# Patient Record
Sex: FEMALE | Race: WHITE
[De-identification: names, ages, dates, MRNs, and addresses within clinical notes are randomized per-mention and may not be internally consistent; named-entity substitution may affect disease eponyms.]

---

## 2017-01-09 NOTE — TELEPHONE ENCOUNTER
Pharmacy called in to follow up on refill for pt.     Current outpatient prescriptions:     •  simvastatin 40 MG Oral Tab, TAKE 1 TABLET BY MOUTH NIGHTLY, Disp: 90 tablet, Rfl: 1

## 2017-01-10 RX ORDER — SIMVASTATIN 40 MG
TABLET ORAL
Qty: 90 TABLET | Refills: 1 | Status: SHIPPED | OUTPATIENT
Start: 2017-01-10 | End: 2017-07-24

## 2017-01-10 RX ORDER — SIMVASTATIN 40 MG
TABLET ORAL
Qty: 90 TABLET | Refills: 0 | Status: SHIPPED | OUTPATIENT
Start: 2017-01-10 | End: 2017-01-10

## 2017-01-10 NOTE — TELEPHONE ENCOUNTER
Hypertensive Medications  Protocol Criteria:  · Appointment scheduled in the past 6 months or in the next 3 months  · BMP or CMP in the past 12 months  · Creatinine result < 2  Recent Visits       Provider Department Primary Dx    6 months ago [address], [place] Type 2 diabetes mellitus without complication, with long-term current use of insulin ([facility]    11 months ago Nicolasa Sanchez MD [facility] [place], [facility], [address], [place] Type 2 diabetes mellitus without complication [facility])        Future Appointmen

## 2017-01-11 ENCOUNTER — PRIOR ORIGINAL RECORDS (OUTPATIENT)
Dept: OTHER | Age: 72
End: 2017-01-11

## 2017-01-12 ENCOUNTER — LAB ENCOUNTER (OUTPATIENT)
Dept: LAB | Facility: HOSPITAL | Age: 72
End: 2017-01-12
Attending: INTERNAL MEDICINE
Payer: MEDICARE

## 2017-01-12 ENCOUNTER — PRIOR ORIGINAL RECORDS (OUTPATIENT)
Dept: OTHER | Age: 72
End: 2017-01-12

## 2017-01-12 DIAGNOSIS — I50.20: Primary | ICD-10-CM

## 2017-01-12 LAB
ANION GAP SERPL CALC-SCNC: 9 MMOL/L (ref 0–18)
BNP SERPL-MCNC: 579 PG/ML (ref 0–100)
BUN SERPL-MCNC: 34 MG/DL (ref 8–20)
BUN/CREAT SERPL: 21.1 (ref 10–20)
CALCIUM SERPL-MCNC: 8.9 MG/DL (ref 8.5–10.5)
CHLORIDE SERPL-SCNC: 100 MMOL/L (ref 95–110)
CO2 SERPL-SCNC: 31 MMOL/L (ref 22–32)
CREAT SERPL-MCNC: 1.61 MG/DL (ref 0.5–1.5)
GLUCOSE SERPL-MCNC: 307 MG/DL (ref 70–99)
OSMOLALITY UR CALC.SUM OF ELEC: 309 MOSM/KG (ref 275–295)
POTASSIUM SERPL-SCNC: 5.5 MMOL/L (ref 3.3–5.1)
SODIUM SERPL-SCNC: 140 MMOL/L (ref 136–144)

## 2017-01-12 PROCEDURE — 36415 COLL VENOUS BLD VENIPUNCTURE: CPT

## 2017-01-12 PROCEDURE — 80048 BASIC METABOLIC PNL TOTAL CA: CPT

## 2017-01-12 PROCEDURE — 83880 ASSAY OF NATRIURETIC PEPTIDE: CPT

## 2017-01-13 LAB
BNP: 579 PMOL/L
BUN: 34 MG/DL
CALCIUM: 8.9 MG/DL
CHLORIDE: 100 MEQ/L
CREATININE, SERUM: 1.61 MG/DL
GLUCOSE: 307 MG/DL
POTASSIUM, SERUM: 5.5 MEQ/L
SODIUM: 140 MEQ/L

## 2017-01-30 ENCOUNTER — OFFICE VISIT (OUTPATIENT)
Dept: INTERNAL MEDICINE CLINIC | Facility: CLINIC | Age: 72
End: 2017-01-30

## 2017-01-30 ENCOUNTER — APPOINTMENT (OUTPATIENT)
Dept: LAB | Facility: HOSPITAL | Age: 72
End: 2017-01-30
Attending: INTERNAL MEDICINE
Payer: MEDICARE

## 2017-01-30 ENCOUNTER — PRIOR ORIGINAL RECORDS (OUTPATIENT)
Dept: OTHER | Age: 72
End: 2017-01-30

## 2017-01-30 VITALS
SYSTOLIC BLOOD PRESSURE: 103 MMHG | WEIGHT: 219 LBS | BODY MASS INDEX: 40.3 KG/M2 | HEIGHT: 62 IN | HEART RATE: 59 BPM | DIASTOLIC BLOOD PRESSURE: 68 MMHG

## 2017-01-30 DIAGNOSIS — I50.23 ACUTE ON CHRONIC SYSTOLIC CONGESTIVE HEART FAILURE (HCC): ICD-10-CM

## 2017-01-30 DIAGNOSIS — M54.50 ACUTE RIGHT-SIDED LOW BACK PAIN WITHOUT SCIATICA: Primary | ICD-10-CM

## 2017-01-30 LAB
ANION GAP SERPL CALC-SCNC: 9 MMOL/L (ref 0–18)
BNP SERPL-MCNC: 345 PG/ML (ref 0–100)
BUN SERPL-MCNC: 33 MG/DL (ref 8–20)
BUN/CREAT SERPL: 23.1 (ref 10–20)
CALCIUM SERPL-MCNC: 9.5 MG/DL (ref 8.5–10.5)
CHLORIDE SERPL-SCNC: 99 MMOL/L (ref 95–110)
CO2 SERPL-SCNC: 32 MMOL/L (ref 22–32)
CREAT SERPL-MCNC: 1.43 MG/DL (ref 0.5–1.5)
GLUCOSE SERPL-MCNC: 190 MG/DL (ref 70–99)
OSMOLALITY UR CALC.SUM OF ELEC: 302 MOSM/KG (ref 275–295)
POTASSIUM SERPL-SCNC: 5.3 MMOL/L (ref 3.3–5.1)
SODIUM SERPL-SCNC: 140 MMOL/L (ref 136–144)

## 2017-01-30 PROCEDURE — 80048 BASIC METABOLIC PNL TOTAL CA: CPT

## 2017-01-30 PROCEDURE — 36415 COLL VENOUS BLD VENIPUNCTURE: CPT

## 2017-01-30 PROCEDURE — G0463 HOSPITAL OUTPT CLINIC VISIT: HCPCS | Performed by: INTERNAL MEDICINE

## 2017-01-30 PROCEDURE — 83880 ASSAY OF NATRIURETIC PEPTIDE: CPT

## 2017-01-30 PROCEDURE — 99213 OFFICE O/P EST LOW 20 MIN: CPT | Performed by: INTERNAL MEDICINE

## 2017-01-30 RX ORDER — BUMETANIDE 2 MG/1
2 TABLET ORAL DAILY
Qty: 90 TABLET | Refills: 1 | Status: ON HOLD | OUTPATIENT
Start: 2017-01-30 | End: 2018-07-09

## 2017-01-30 RX ORDER — MONTELUKAST SODIUM 10 MG/1
TABLET ORAL
Qty: 90 TABLET | Refills: 1 | Status: SHIPPED | OUTPATIENT
Start: 2017-01-30 | End: 2017-07-24

## 2017-01-30 RX ORDER — CARVEDILOL 12.5 MG/1
TABLET ORAL
Qty: 360 TABLET | Refills: 1 | Status: SHIPPED | OUTPATIENT
Start: 2017-01-30 | End: 2017-08-27

## 2017-01-30 NOTE — H&P
Rosa Elena Hillman is a 70year old female. Patient presents with:  Establish Care    HPI:   Ms. Debbie Harris presents this afternoon, accompanied by a friend, to establish ongoing primary care.   She expresses displeasure at the amount of waiting time she has experie 72 UNITS SUBCUTANEOUS WITH DINNER Disp: 45 mL Rfl: 2   DIGOX 125 MCG Oral Tab TAKE 1 TABLET BY MOUTH DAILY.  Disp: 90 tablet Rfl: 1   MONTELUKAST SODIUM 10 MG Oral Tab TAKE 1 TABLET(10 MG) BY MOUTH EVERY DAY Disp: 90 tablet Rfl: 0   SPIRONOLACTONE 25 MG Ora carcinoma, forehead 2012     excision   • Diabetes ([facility]       Social History:    Smoking Status: Former Smoker                   Packs/Day: 0.00  Years:           Quit date: 01/01/2003    Smokeless Status: Former User                       Alcohol Use: No

## 2017-01-30 NOTE — PATIENT INSTRUCTIONS
Please continue your current medications. Please rest your back and apply heat 2-3 times daily. You may take Tylenol or acetaminophen when needed for pain relief. Avoid all anti-inflammatory medication. Call if no better. Return visit in 3 months.

## 2017-01-31 ENCOUNTER — PRIOR ORIGINAL RECORDS (OUTPATIENT)
Dept: OTHER | Age: 72
End: 2017-01-31

## 2017-01-31 LAB
BNP: 345 PMOL/L
BUN: 33 MG/DL
CALCIUM: 9.5 MG/DL
CHLORIDE: 99 MEQ/L
CREATININE, SERUM: 1.43 MG/DL
GLUCOSE: 190 MG/DL
POTASSIUM, SERUM: 5.3 MEQ/L
SODIUM: 140 MEQ/L

## 2017-02-14 ENCOUNTER — PRIOR ORIGINAL RECORDS (OUTPATIENT)
Dept: OTHER | Age: 72
End: 2017-02-14

## 2017-02-14 ENCOUNTER — TELEPHONE (OUTPATIENT)
Dept: CARDIOLOGY CLINIC | Facility: HOSPITAL | Age: 72
End: 2017-02-14

## 2017-02-14 ENCOUNTER — TELEPHONE (OUTPATIENT)
Dept: INTERNAL MEDICINE CLINIC | Facility: CLINIC | Age: 72
End: 2017-02-14

## 2017-02-14 NOTE — TELEPHONE ENCOUNTER
Pt contacted and strongly advised to please go to the urgent care for evaluation since she won't go to the ER. Pt agreed. Will f/u up tomorrow.

## 2017-02-14 NOTE — TELEPHONE ENCOUNTER
Reason for Call/Chief Complaint: left leg swelling and pain  Onset: 2 days  Nursing Assessment/Associated Symptoms: left leg swollen,painful,warm to touch . Had same problem  3 weeks ago and went away when put on water pill but now came back.  Pt refusing t

## 2017-02-14 NOTE — TELEPHONE ENCOUNTER
Patient is a diabetic and is calling because she said her left leg hurts and is warm and tender to the touch. She said she always has water in her legs. She does take her water pill daily. A few weeks ago she was put on an extra water pill.

## 2017-02-14 NOTE — TELEPHONE ENCOUNTER
Patient called to inform that she has been having leg pain, slight swelling but stated that she always has swelling of legs.  After checking with Np's at specialty care center, pt was recommended to make appointment or go to e.r. I offered pt an appt for Th

## 2017-02-15 ENCOUNTER — OFFICE VISIT (OUTPATIENT)
Dept: INTERNAL MEDICINE CLINIC | Facility: CLINIC | Age: 72
End: 2017-02-15

## 2017-02-15 ENCOUNTER — HOSPITAL ENCOUNTER (OUTPATIENT)
Dept: ULTRASOUND IMAGING | Facility: HOSPITAL | Age: 72
Discharge: HOME | End: 2017-02-15
Attending: INTERNAL MEDICINE | Admitting: INTERNAL MEDICINE
Payer: MEDICARE

## 2017-02-15 VITALS
BODY MASS INDEX: 40.12 KG/M2 | RESPIRATION RATE: 16 BRPM | TEMPERATURE: 98 F | HEIGHT: 62 IN | SYSTOLIC BLOOD PRESSURE: 138 MMHG | WEIGHT: 218 LBS | DIASTOLIC BLOOD PRESSURE: 63 MMHG | HEART RATE: 76 BPM

## 2017-02-15 DIAGNOSIS — R60.0 LEG EDEMA, LEFT: Primary | ICD-10-CM

## 2017-02-15 DIAGNOSIS — R60.0 LEG EDEMA, LEFT: ICD-10-CM

## 2017-02-15 PROCEDURE — G0463 HOSPITAL OUTPT CLINIC VISIT: HCPCS | Performed by: INTERNAL MEDICINE

## 2017-02-15 PROCEDURE — 99213 OFFICE O/P EST LOW 20 MIN: CPT | Performed by: INTERNAL MEDICINE

## 2017-02-15 PROCEDURE — 93971 EXTREMITY STUDY: CPT

## 2017-02-15 RX ORDER — CEFADROXIL 500 MG/1
500 CAPSULE ORAL 2 TIMES DAILY
Qty: 14 CAPSULE | Refills: 0 | Status: SHIPPED | OUTPATIENT
Start: 2017-02-15 | End: 2017-02-22

## 2017-02-15 NOTE — TELEPHONE ENCOUNTER
[facility]     Phone room when the patient calls back please schedule the appointment as noted below. Thanks.

## 2017-02-15 NOTE — TELEPHONE ENCOUNTER
Patient went to the IC and was informed can possible have a blood clot which wants to rule out. They recommended to go to the ED. Patient is refusing.    Patient stated her feet were retaining water again and she took the Metolazone and within hours her

## 2017-02-15 NOTE — TELEPHONE ENCOUNTER
Can put pt in to see me on Wednesday between 2 and 430. I will be in Count includes the [facility] at that time doing administration work.  I can see her as an add on

## 2017-02-15 NOTE — PROGRESS NOTES
HPI:    Patient ID: Tomasa Reaves is a 70year old female. HPI  Patient is here complaining of swelling, redness, pain in the left lower leg. Multiple underlying chronic issues as listed below. I last saw her in the office in June.   Since that time sh CARDIAC PACEMAKER PLACEMENT  2012    Comment replacement of pacemaker and leads    CATARACT EXTRACTION W/  INTRAOCULAR LENS IMPLANT Right 5/14/12    Comment Dr. Kings Hebert at [facility]. [address] Left 6/4/12    Comment 5   Lancet Device Does not apply Misc Use as directed 3 times a day Disp: 300 each Rfl: 3   SEAN CONTOUR TEST In Vitro Strip USE TWO TO THREE TIMES DAILY AS DIRECTED Disp: 100 strip Rfl: 5   lisinopril (PRINIVIL,ZESTRIL) 2.5 MG Oral Tab Take 1 tablet (2.5 Psychiatric: She has a normal mood and affect.  Thought content normal.              ASSESSMENT/PLAN:   Patient with(R60.0) Leg edema, left  (primary encounter diagnosis)  Plan: US VENOUS DOPPLER LEG LEFT - DIAG IMG         (CPT=93971)         Leg edema b

## 2017-02-15 NOTE — TELEPHONE ENCOUNTER
Pt called back. She will be added to Dr. Arnulfo Junior schedule tomorrow at 4:10pm at the [facility]. Pt would like to speak with a nurse. She stts that she went to the Immediate Care and they were concerned that she may have a blood clot. Pt refuses to go to the ER.

## 2017-02-21 ENCOUNTER — TELEPHONE (OUTPATIENT)
Dept: INTERNAL MEDICINE CLINIC | Facility: CLINIC | Age: 72
End: 2017-02-21

## 2017-02-21 RX ORDER — SULFAMETHOXAZOLE AND TRIMETHOPRIM 800; 160 MG/1; MG/1
1 TABLET ORAL 2 TIMES DAILY
Qty: 14 TABLET | Refills: 0 | Status: SHIPPED | OUTPATIENT
Start: 2017-02-21 | End: 2017-02-28

## 2017-02-21 NOTE — TELEPHONE ENCOUNTER
Reason for Call/Chief Complaint: left leg  Onset: since LOV 2/15  Nursing Assessment/Associated Symptoms: will take last dose of ABX this evening, feels a constant throb with sitting,stated the swelling is more by night,no break in skin integrity,continue

## 2017-02-21 NOTE — TELEPHONE ENCOUNTER
Pt called in stating she just came in to see Dr. Dariela Dietz on 2/15 and was given an antibiotic for the infection on her leg.   Pt states today is her last day on the antibiotic and she does not feel as if the infection has improved, pt states she has the same sym

## 2017-02-21 NOTE — TELEPHONE ENCOUNTER
ASSESSMENT/PLAN:    Patient with(R60.0) Leg edema, left  (primary encounter diagnosis)  Plan: US VENOUS DOPPLER LEG LEFT - DIAG IMG          (CPT=93971)         Leg edema but unilateral tenderness, redness, warmth.  Given the unilateral nature and other ri

## 2017-02-22 NOTE — TELEPHONE ENCOUNTER
Spoke with patient and verbalized understanding . Advised to call back or go straight to ER if s/sx worsen, questions or concerns. Patient verbalized understanding and agrees with plan.

## 2017-02-22 NOTE — TELEPHONE ENCOUNTER
Call patient. Prescription for Bactrim sent to pharmacy make appointment if not resolving with that.

## 2017-02-23 ENCOUNTER — APPOINTMENT (OUTPATIENT)
Dept: LAB | Age: 72
End: 2017-02-23
Attending: INTERNAL MEDICINE
Payer: MEDICARE

## 2017-02-23 ENCOUNTER — HOSPITAL ENCOUNTER (OUTPATIENT)
Dept: GENERAL RADIOLOGY | Age: 72
Discharge: HOME | End: 2017-02-23
Attending: INTERNAL MEDICINE
Payer: MEDICARE

## 2017-02-23 ENCOUNTER — TELEPHONE (OUTPATIENT)
Dept: INTERNAL MEDICINE CLINIC | Facility: CLINIC | Age: 72
End: 2017-02-23

## 2017-02-23 ENCOUNTER — OFFICE VISIT (OUTPATIENT)
Dept: INTERNAL MEDICINE CLINIC | Facility: CLINIC | Age: 72
End: 2017-02-23

## 2017-02-23 ENCOUNTER — HOSPITAL ENCOUNTER (OUTPATIENT)
Dept: GENERAL RADIOLOGY | Age: 72
Discharge: HOME | End: 2017-02-23
Attending: INTERNAL MEDICINE | Admitting: INTERNAL MEDICINE
Payer: MEDICARE

## 2017-02-23 VITALS
DIASTOLIC BLOOD PRESSURE: 60 MMHG | HEIGHT: 62 IN | TEMPERATURE: 97 F | SYSTOLIC BLOOD PRESSURE: 114 MMHG | BODY MASS INDEX: 41.02 KG/M2 | WEIGHT: 222.88 LBS | HEART RATE: 61 BPM

## 2017-02-23 DIAGNOSIS — R07.81 RIB PAIN ON RIGHT SIDE: ICD-10-CM

## 2017-02-23 DIAGNOSIS — R07.81 RIB PAIN ON RIGHT SIDE: Primary | ICD-10-CM

## 2017-02-23 PROCEDURE — 99214 OFFICE O/P EST MOD 30 MIN: CPT | Performed by: INTERNAL MEDICINE

## 2017-02-23 PROCEDURE — 71020 XR CHEST PA + LAT CHEST (CPT=71020): CPT

## 2017-02-23 PROCEDURE — G0463 HOSPITAL OUTPT CLINIC VISIT: HCPCS | Performed by: INTERNAL MEDICINE

## 2017-02-23 PROCEDURE — 86334 IMMUNOFIX E-PHORESIS SERUM: CPT

## 2017-02-23 PROCEDURE — 84165 PROTEIN E-PHORESIS SERUM: CPT | Performed by: INTERNAL MEDICINE

## 2017-02-23 PROCEDURE — 71100 X-RAY EXAM RIBS UNI 2 VIEWS: CPT

## 2017-02-23 PROCEDURE — 36415 COLL VENOUS BLD VENIPUNCTURE: CPT

## 2017-02-23 RX ORDER — ACETAMINOPHEN AND CODEINE PHOSPHATE 300; 30 MG/1; MG/1
1 TABLET ORAL EVERY 6 HOURS PRN
Qty: 30 TABLET | Refills: 0 | Status: SHIPPED | OUTPATIENT
Start: 2017-02-23 | End: 2017-05-17

## 2017-02-23 NOTE — TELEPHONE ENCOUNTER
Reason for Call/Chief Complaint: back pain  Onset: 3 weeks, worsened last night  Nursing Assessment/Associated Symptoms: was a dull pain, but last night turned into sharp pain that comes and goes with movement. Denies fall or injury.   Pain is mostly below

## 2017-02-23 NOTE — TELEPHONE ENCOUNTER
Pt stts she has back pain. No open appointments with Dr. Anthony Rousseau. Pt declined to see another doctor. Pt asking to speak with a nurse. Please advise.

## 2017-02-23 NOTE — PROGRESS NOTES
HPI:    Patient ID: Hugh Tejada is a 70year old female. Back Pain  This is a new problem. The current episode started more than 1 month ago. The problem occurs constantly. The problem has been waxing and waning since onset.  Pain location: right lower (BD PEN NEEDLE SHORT U/F) 31G X 8 MM Does not apply Misc Use three times daily Disp: 100 each Rfl: 5   Lancet Device Does not apply Misc Use as directed 3 times a day Disp: 300 each Rfl: 3   SEAN CONTOUR TEST In Vitro Strip USE TWO TO THREE TIMES DAILY AS Musculoskeletal: She exhibits no edema or tenderness. Lymphadenopathy:     She has no cervical adenopathy. Neurological: She is alert. Skin: No rash noted. She is not diaphoretic.               ASSESSMENT/PLAN:   1. Rib pain on right side  Pt's back

## 2017-02-24 ENCOUNTER — TELEPHONE (OUTPATIENT)
Dept: INTERNAL MEDICINE CLINIC | Facility: CLINIC | Age: 72
End: 2017-02-24

## 2017-02-24 NOTE — TELEPHONE ENCOUNTER
Pt stts she feels light head and nauseous. Pt feels like she is going to throw up and her sugars are low pt believes its from the new medication that has codine in it.  Please advise

## 2017-02-24 NOTE — TELEPHONE ENCOUNTER
Reason for Call/Chief Complaint: Nausea, hypoglycemia  Onset: Today  Nursing Assessment/Associated Symptoms: Spoke to pt, she states that she saw Dr. Joan Stern yesterday due to back pain.  She was put on Tylenol #3 for the back pain and is now having nause well.  Please advise on behalf of Dr. George Willett (out of office this afternoon). Patient provided with clinic contact information, hours of operation and will call back if needed. Patient verbalizes understanding and agrees with plan.

## 2017-02-27 ENCOUNTER — PRIOR ORIGINAL RECORDS (OUTPATIENT)
Dept: OTHER | Age: 72
End: 2017-02-27

## 2017-02-27 ENCOUNTER — MYAURORA ACCOUNT LINK (OUTPATIENT)
Dept: OTHER | Age: 72
End: 2017-02-27

## 2017-02-28 LAB
ALBUMIN SERPL ELPH-MCNC: 4.56 G/DL (ref 3.8–5.8)
ALBUMIN/GLOB SERPL: 1.67 {RATIO} (ref 1–2)
ALPHA1 GLOB SERPL ELPH-MCNC: 0.23 G/DL (ref 0.1–0.3)
ALPHA2 GLOB SERPL ELPH-MCNC: 0.69 G/DL (ref 0.6–1)
B-GLOBULIN SERPL ELPH-MCNC: 0.91 G/DL (ref 0.7–1.3)
GAMMA GLOB SERPL ELPH-MCNC: 0.9 G/DL (ref 0.5–1.7)
TOTAL PROTEIN (SPECIAL TESTING): 7.3 G/DL (ref 6.5–9.1)

## 2017-03-01 ENCOUNTER — TELEPHONE (OUTPATIENT)
Dept: FAMILY MEDICINE CLINIC | Facility: CLINIC | Age: 72
End: 2017-03-01

## 2017-03-01 RX ORDER — SULFAMETHOXAZOLE AND TRIMETHOPRIM 800; 160 MG/1; MG/1
1 TABLET ORAL 2 TIMES DAILY
Qty: 14 TABLET | Refills: 0 | Status: SHIPPED | OUTPATIENT
Start: 2017-03-01 | End: 2017-03-06 | Stop reason: ALTCHOICE

## 2017-03-01 NOTE — TELEPHONE ENCOUNTER
Pt is calling state that she still is having some pain in her left leg pt state that she completed the antibiotic today (Sulsameth)  Pt state that the medication did upset her stomach  Pt is requesting to speak with a RN

## 2017-03-01 NOTE — TELEPHONE ENCOUNTER
Reason for Call/Chief Complaint: left leg pain and redness  Onset: 1 month  Nursing Assessment/Associated Symptoms: Pt stts she still has left leg pain,redness,bump and swelling . Finished her antibiotics but it didn't really help much.  Also has muscle brigitte

## 2017-03-02 NOTE — TELEPHONE ENCOUNTER
The lab tests that were done on February 23 are all normal.  No evidence of abnormal protein or immunoglobulins in the blood.

## 2017-03-02 NOTE — TELEPHONE ENCOUNTER
Please contact the patient. I have sent prescription for different antibiotic to the pharmacy. Start on that. If not fully resolving, make appointment for reevaluation.

## 2017-03-03 RX ORDER — CEPHALEXIN 500 MG/1
500 CAPSULE ORAL 3 TIMES DAILY
Qty: 21 CAPSULE | Refills: 0 | Status: SHIPPED | OUTPATIENT
Start: 2017-03-03 | End: 2017-04-17

## 2017-03-03 NOTE — TELEPHONE ENCOUNTER
I spoke with the patient. She does not want to go on the Bactrim. We will place her on Keflex. She tolerated this well last September. Call if not fully resolving.   Not clear if there is residual cellulitis versus just dermatitis or stasis changes rela

## 2017-03-03 NOTE — TELEPHONE ENCOUNTER
Please call pt at work [phone] with response, she will not be home until late. Dr. Matheus Chan, pt took her complete course of sulfa prior to her call on 3/1. She noticed an improvement over the last antibiotic, however the sulfa made her very ill.  Sh

## 2017-03-06 ENCOUNTER — PRIOR ORIGINAL RECORDS (OUTPATIENT)
Dept: OTHER | Age: 72
End: 2017-03-06

## 2017-03-06 ENCOUNTER — OFFICE VISIT (OUTPATIENT)
Dept: ENDOCRINOLOGY CLINIC | Facility: CLINIC | Age: 72
End: 2017-03-06

## 2017-03-06 VITALS
HEART RATE: 60 BPM | WEIGHT: 216.81 LBS | DIASTOLIC BLOOD PRESSURE: 61 MMHG | HEIGHT: 62 IN | SYSTOLIC BLOOD PRESSURE: 97 MMHG | BODY MASS INDEX: 39.9 KG/M2

## 2017-03-06 DIAGNOSIS — E11.69 DYSLIPIDEMIA ASSOCIATED WITH TYPE 2 DIABETES MELLITUS (HCC): Primary | ICD-10-CM

## 2017-03-06 DIAGNOSIS — E11.65 TYPE 2 DIABETES MELLITUS WITH HYPERGLYCEMIA, WITH LONG-TERM CURRENT USE OF INSULIN (HCC): ICD-10-CM

## 2017-03-06 DIAGNOSIS — Z79.4 TYPE 2 DIABETES MELLITUS WITH HYPERGLYCEMIA, WITH LONG-TERM CURRENT USE OF INSULIN (HCC): ICD-10-CM

## 2017-03-06 DIAGNOSIS — E78.5 DYSLIPIDEMIA ASSOCIATED WITH TYPE 2 DIABETES MELLITUS (HCC): Primary | ICD-10-CM

## 2017-03-06 LAB
CARTRIDGE LOT#: ABNORMAL NUMERIC
GLUCOSE BLOOD: 134
HEMOGLOBIN A1C: 7.8 % (ref 4.3–5.6)
TEST STRIP LOT #: NORMAL NUMERIC

## 2017-03-06 PROCEDURE — 83036 HEMOGLOBIN GLYCOSYLATED A1C: CPT | Performed by: INTERNAL MEDICINE

## 2017-03-06 PROCEDURE — 82962 GLUCOSE BLOOD TEST: CPT | Performed by: INTERNAL MEDICINE

## 2017-03-06 PROCEDURE — 99214 OFFICE O/P EST MOD 30 MIN: CPT | Performed by: INTERNAL MEDICINE

## 2017-03-06 PROCEDURE — 36416 COLLJ CAPILLARY BLOOD SPEC: CPT | Performed by: INTERNAL MEDICINE

## 2017-03-06 RX ORDER — METOLAZONE 2.5 MG/1
TABLET ORAL
Refills: 1 | COMMUNITY
Start: 2017-02-27 | End: 2018-04-17 | Stop reason: ALTCHOICE

## 2017-03-06 NOTE — PROGRESS NOTES
Return Office Visit     CHIEF COMPLAINT:    Diabetes     HISTORY OF PRESENT ILLNESS:  Mable Jones is a 70year old female who presents for follow up for for Diabetes. She recently has left LE cellulitis and has been on antibiotics.    Her BG were elev DAILY.  Disp: 90 tablet Rfl: 1   MICROLET LANCETS Does not apply Misc TESTING TID Disp:  Rfl: 0   Insulin Pen Needle (BD PEN NEEDLE SHORT U/F) 31G X 8 MM Does not apply Misc Use three times daily Disp: 100 each Rfl: 5   Lancet Device Does not apply Misc Use vomiting, diarrhea, constipation, bleeding  Neurology: Negative for: headache, numbness, weakness  Genito-Urinary: Negative for: dysuria, frequency  Psychiatric: Negative for:  depression, anxiety  Hematology/Lymphatics: Negative for: bruising, lower extre various micro vascular and macrovascular complications. Discussed the imoprtance of compliance with diet and exercise. Goal a1c: < 7 %   Goal Fasting, Goal pre meal:   a).  Medications:  Insulin 70/30 68 am and 75 pm    She knows to call if BG is

## 2017-03-15 ENCOUNTER — PRIOR ORIGINAL RECORDS (OUTPATIENT)
Dept: OTHER | Age: 72
End: 2017-03-15

## 2017-04-17 ENCOUNTER — TELEPHONE (OUTPATIENT)
Dept: INTERNAL MEDICINE CLINIC | Facility: CLINIC | Age: 72
End: 2017-04-17

## 2017-04-17 RX ORDER — INSULIN HUMAN 100 [IU]/ML
INJECTION, SUSPENSION SUBCUTANEOUS
Qty: 45 ML | Refills: 3 | Status: SHIPPED | OUTPATIENT
Start: 2017-04-17 | End: 2017-08-28

## 2017-04-17 RX ORDER — CEPHALEXIN 500 MG/1
500 CAPSULE ORAL 2 TIMES DAILY
Qty: 14 CAPSULE | Refills: 0 | Status: SHIPPED | OUTPATIENT
Start: 2017-04-17 | End: 2017-04-24

## 2017-04-17 NOTE — TELEPHONE ENCOUNTER
Actions Requested: The antibiotic Cephalexin as she had before.   Situation/Background   Problem:   Left foot infection   Onset:    Thursday morning   Associated Symptoms:    Patient stated she has a flair up of her left foot tenderness, warmth and redne

## 2017-04-17 NOTE — TELEPHONE ENCOUNTER
Pt said having foot problem, inflammation, said same problem she had previously  Requested appt with [facility] only- not slots  Asking if could prescribe antibiotic- said it was the last one [facility] prescribed

## 2017-04-28 NOTE — TELEPHONE ENCOUNTER
Refill Protocol Appointment Criteria: Refilled per protocol    · Appointment scheduled in the past 12 months or in the next 3 months  Recent Visits       Provider Department Primary Dx    2 months ago Lata Calle MD [facility], [facility], [address],

## 2017-05-16 ENCOUNTER — MYAURORA ACCOUNT LINK (OUTPATIENT)
Dept: OTHER | Age: 72
End: 2017-05-16

## 2017-05-17 ENCOUNTER — OFFICE VISIT (OUTPATIENT)
Dept: FAMILY MEDICINE CLINIC | Facility: CLINIC | Age: 72
End: 2017-05-17

## 2017-05-17 VITALS
DIASTOLIC BLOOD PRESSURE: 50 MMHG | WEIGHT: 216.81 LBS | SYSTOLIC BLOOD PRESSURE: 86 MMHG | HEART RATE: 76 BPM | TEMPERATURE: 98 F | HEIGHT: 62 IN | BODY MASS INDEX: 39.9 KG/M2

## 2017-05-17 DIAGNOSIS — L30.9 DERMATITIS: ICD-10-CM

## 2017-05-17 DIAGNOSIS — Z79.4 TYPE 2 DIABETES MELLITUS WITH DIABETIC PERIPHERAL ANGIOPATHY WITHOUT GANGRENE, WITH LONG-TERM CURRENT USE OF INSULIN (HCC): ICD-10-CM

## 2017-05-17 DIAGNOSIS — E11.51 TYPE 2 DIABETES MELLITUS WITH DIABETIC PERIPHERAL ANGIOPATHY WITHOUT GANGRENE, WITH LONG-TERM CURRENT USE OF INSULIN (HCC): ICD-10-CM

## 2017-05-17 DIAGNOSIS — I87.2 VENOUS INSUFFICIENCY: ICD-10-CM

## 2017-05-17 DIAGNOSIS — I83.90 VARICOSITIES OF LEG: ICD-10-CM

## 2017-05-17 DIAGNOSIS — D17.1 LIPOMA OF ANTERIOR CHEST WALL: ICD-10-CM

## 2017-05-17 DIAGNOSIS — I95.2 HYPOTENSION DUE TO MEDICATION: Primary | ICD-10-CM

## 2017-05-17 PROBLEM — E66.01 SEVERE OBESITY (BMI 35.0-39.9) WITH COMORBIDITY (HCC): Status: ACTIVE | Noted: 2017-05-17

## 2017-05-17 PROBLEM — Z95.810 CARDIAC DEFIBRILLATOR IN PLACE: Status: ACTIVE | Noted: 2017-05-17

## 2017-05-17 PROCEDURE — 99214 OFFICE O/P EST MOD 30 MIN: CPT | Performed by: NURSE PRACTITIONER

## 2017-05-17 PROCEDURE — 99212 OFFICE O/P EST SF 10 MIN: CPT | Performed by: NURSE PRACTITIONER

## 2017-05-17 RX ORDER — DIAPER,BRIEF,INFANT-TODD,DISP
1 EACH MISCELLANEOUS 2 TIMES DAILY
Qty: 35 G | Refills: 0 | Status: SHIPPED | OUTPATIENT
Start: 2017-05-17 | End: 2017-06-26 | Stop reason: ALTCHOICE

## 2017-05-17 RX ORDER — ACETAMINOPHEN AND CODEINE PHOSPHATE 300; 30 MG/1; MG/1
1 TABLET ORAL EVERY 6 HOURS PRN
Qty: 30 TABLET | Refills: 0 | Status: SHIPPED | OUTPATIENT
Start: 2017-05-17 | End: 2017-07-31

## 2017-05-17 NOTE — PROGRESS NOTES
[place]  This is a chronic problem. The current episode started more than 1 month ago. The problem has been gradually worsening. Associated symptoms include fatigue and a rash (mid back).  Pertinent negatives include no myalgias, neck pain, swollen glands, vis cook for myself-it is just easier to pick something up on my way home from work. \"  Does not engage in any outside activities except work. Has a blood pressure machine at home but doesn't routinely check bp.   Review of Systems   Constitutional: Positive fo Children: N/A     Occupational History  None on file     Social History Main Topics   Smoking status: Former Smoker     Quit date: 01/01/2003    Smokeless tobacco: Former User    Alcohol Use: No    Drug Use: No    Sexual Activity: Not on file   Not on file Does not apply Misc Use as directed 3 times a day Disp: 300 each Rfl: 3   lisinopril (PRINIVIL,ZESTRIL) 2.5 MG Oral Tab Take 1 tablet (2.5 mg total) by mouth daily.  Disp: 90 tablet Rfl: 3   Nebulizers (VIOS AEROSOL DELIVERY SYSTEM) Does not apply Misc  Dis ([facility]    6. Lipoma of anterior chest wall        Plan: 1. Hypotension-monitor blood pressure daily at home-call if bp cosistently <90/50  Will not change meds at this time-will need to evaluate pedal edema-vascular vs cardiac  2.  Peripheral vascular diseas

## 2017-05-23 ENCOUNTER — HOSPITAL ENCOUNTER (OUTPATIENT)
Dept: ULTRASOUND IMAGING | Facility: HOSPITAL | Age: 72
Discharge: HOME | End: 2017-05-23
Attending: NURSE PRACTITIONER
Payer: MEDICARE

## 2017-05-23 DIAGNOSIS — D17.1 LIPOMA OF ANTERIOR CHEST WALL: ICD-10-CM

## 2017-05-23 PROCEDURE — 76604 US EXAM CHEST: CPT | Performed by: NURSE PRACTITIONER

## 2017-05-24 DIAGNOSIS — D17.1 LIPOMA OF CHEST WALL: Primary | ICD-10-CM

## 2017-05-25 ENCOUNTER — TELEPHONE (OUTPATIENT)
Dept: INTERNAL MEDICINE CLINIC | Facility: CLINIC | Age: 72
End: 2017-05-25

## 2017-05-25 NOTE — TELEPHONE ENCOUNTER
Notes Recorded by MIRI Thompson, FNP-C on 5/24/2017 at 6:49 PM  Ultrasound is suggestive of a fatty tumor (lipoma) as we had discussed.  Further evaluation is recommended with a CT scan without contrast.  I will place order for CT scan; please ca

## 2017-05-26 NOTE — TELEPHONE ENCOUNTER
Please note. Thank you. Pt returned call and provider message below relayed to pt. Phone numbers for Central Scheduling #[phone] and Dr. Magali Carbajal office #[phone] provided for the pt to call for appts.     Pt verbalized understanding but stat

## 2017-06-04 NOTE — TELEPHONE ENCOUNTER
Please advise on refill request, med is not part of protocol.     ·   Recent Visits       Provider Department Primary Dx    3 months ago Giuliana Novak MD [facility], [facility], [address], [place] Rib pain on right side    3 months ago [facility] [place]

## 2017-06-05 ENCOUNTER — TELEPHONE (OUTPATIENT)
Dept: INTERNAL MEDICINE CLINIC | Facility: CLINIC | Age: 72
End: 2017-06-05

## 2017-06-05 RX ORDER — DIGOXIN 125 UG/1
TABLET ORAL
Qty: 90 TABLET | Refills: 1 | Status: SHIPPED | OUTPATIENT
Start: 2017-06-05 | End: 2017-11-24

## 2017-06-05 NOTE — TELEPHONE ENCOUNTER
Patient is calling to request refill for the medication listed below. Please advise.        Lisinopril 2.5 MG

## 2017-06-05 NOTE — TELEPHONE ENCOUNTER
Patient is calling to follow up on the medication. Patient is completely out and has been calling it in since the 1st of the month. Please advise.

## 2017-06-06 RX ORDER — LISINOPRIL 2.5 MG/1
2.5 TABLET ORAL DAILY
Qty: 90 TABLET | Refills: 1 | Status: SHIPPED | OUTPATIENT
Start: 2017-06-06 | End: 2017-11-24

## 2017-06-06 NOTE — TELEPHONE ENCOUNTER
Hypertensive Medications  Protocol Criteria:  · Appointment scheduled in the past 6 months or in the next 3 months  · BMP or CMP in the past 12 months  · Creatinine result < 2  Recent Visits       Provider Department Primary Dx    3 months ago [facility]

## 2017-06-22 ENCOUNTER — TELEPHONE (OUTPATIENT)
Dept: INTERNAL MEDICINE CLINIC | Facility: CLINIC | Age: 72
End: 2017-06-22

## 2017-06-22 ENCOUNTER — TELEPHONE (OUTPATIENT)
Dept: ORTHOPEDICS CLINIC | Facility: CLINIC | Age: 72
End: 2017-06-22

## 2017-06-22 NOTE — TELEPHONE ENCOUNTER
Pt. states that she is a diabetic and having discoloration in both feet. Should pt be seen sooner then 1st avail. For Consult?

## 2017-06-22 NOTE — TELEPHONE ENCOUNTER
Spoke to pt and she states she wants to see PCP, Dr. Ramon Teran, first before seeing SCR. Pt will call back to schedule.

## 2017-06-23 NOTE — TELEPHONE ENCOUNTER
Pt is calling back. She stts that Mondays are the best day for her to see the dr. Dr. Fam Cost Monday 06/26 is booked. Would he want to fit her in on that day?

## 2017-06-23 NOTE — TELEPHONE ENCOUNTER
Phone room please assist pt with OV appt  For Monday 6/26/ 17 at end of AM as instructed per MD below if pt agrees, may transfer to RNs for further assistance. LMTCB     Please transfer [phone] anytime. Thank you.

## 2017-06-26 ENCOUNTER — OFFICE VISIT (OUTPATIENT)
Dept: INTERNAL MEDICINE CLINIC | Facility: CLINIC | Age: 72
End: 2017-06-26

## 2017-06-26 VITALS
WEIGHT: 215.38 LBS | HEIGHT: 62 IN | HEART RATE: 60 BPM | BODY MASS INDEX: 39.63 KG/M2 | DIASTOLIC BLOOD PRESSURE: 64 MMHG | RESPIRATION RATE: 20 BRPM | SYSTOLIC BLOOD PRESSURE: 106 MMHG | TEMPERATURE: 98 F

## 2017-06-26 DIAGNOSIS — D17.9 LIPOMA, UNSPECIFIED SITE: Primary | ICD-10-CM

## 2017-06-26 DIAGNOSIS — L30.9 DERMATITIS: ICD-10-CM

## 2017-06-26 DIAGNOSIS — I87.2 VENOUS STASIS DERMATITIS OF BOTH LOWER EXTREMITIES: ICD-10-CM

## 2017-06-26 PROCEDURE — 99213 OFFICE O/P EST LOW 20 MIN: CPT | Performed by: INTERNAL MEDICINE

## 2017-06-26 PROCEDURE — G0463 HOSPITAL OUTPT CLINIC VISIT: HCPCS | Performed by: INTERNAL MEDICINE

## 2017-07-05 NOTE — PROGRESS NOTES
HPI:    Patient ID: Lion Lanier is a 70year old female. HPI  Patient is here with a few active concerns. She is a lump on the left supraclavicular area. Ultrasound was done on May 23. Showed a 6.2 x 2.9 x 4.9 cm mass. They advise CT scan.   The pa CVA   • Macular degeneration Neg    • Glaucoma Neg       Smoking status: Former Smoker                                                              Packs/day: 0.00      Years: 0.00         Quit date: 1/1/2003  Smokeless tobacco: Former User times daily Disp: 100 each Rfl: 5   Lancet Device Does not apply Misc Use as directed 3 times a day Disp: 300 each Rfl: 3   Nebulizers (VIOS AEROSOL DELIVERY SYSTEM) Does not apply Misc  Disp:  Rfl: 0   Rivaroxaban (XARELTO) 20 MG Oral Tab Take 20 mg by mo

## 2017-07-18 RX ORDER — PEN NEEDLE, DIABETIC 31 GX5/16"
NEEDLE, DISPOSABLE MISCELLANEOUS
Refills: 0 | Status: CANCELLED | OUTPATIENT
Start: 2017-07-18

## 2017-07-20 ENCOUNTER — TELEPHONE (OUTPATIENT)
Dept: ENDOCRINOLOGY CLINIC | Facility: CLINIC | Age: 72
End: 2017-07-20

## 2017-07-20 NOTE — TELEPHONE ENCOUNTER
Rx filled per protocol. Spoke to pt and offered appt. Appt scheduled for 8/7/17 at 1030. Pt verbalized understanding and agreement to appt scheduled. Rx sent to pharmacy per protocol. Pharmacy verified.

## 2017-07-20 NOTE — TELEPHONE ENCOUNTER
Pt states she does not have any more Pen Needles and requesting refills to be sent to pharm today prior to her leaving work this evening. Pls call - aware AM is not in office. Thank you.

## 2017-07-20 NOTE — TELEPHONE ENCOUNTER
Dr. Michael Pozo, please review rx request for B-D Pen needles for Insulin. Thank you. Spoke to Valentin from pharmacy requesting refill request for  BD pen needles for insulin informed message would be sent to Dr. Michael Pozo to review.  Dulce Maria Pozo

## 2017-07-20 NOTE — TELEPHONE ENCOUNTER
Sara Richter from [address] called regarding refill for RX:B-d pen needle 31 gauze by 8ml - Pls call at: [phone],thanks.

## 2017-07-25 RX ORDER — MONTELUKAST SODIUM 10 MG/1
TABLET ORAL
Qty: 90 TABLET | Refills: 1 | Status: SHIPPED | OUTPATIENT
Start: 2017-07-25 | End: 2018-03-04

## 2017-07-26 RX ORDER — SIMVASTATIN 40 MG
TABLET ORAL
Qty: 90 TABLET | Refills: 1 | Status: SHIPPED | OUTPATIENT
Start: 2017-07-26 | End: 2017-07-31

## 2017-07-26 RX ORDER — SIMVASTATIN 40 MG
TABLET ORAL
Qty: 90 TABLET | Refills: 1 | Status: SHIPPED | OUTPATIENT
Start: 2017-07-26 | End: 2017-11-24

## 2017-07-31 ENCOUNTER — OFFICE VISIT (OUTPATIENT)
Dept: INTERNAL MEDICINE CLINIC | Facility: CLINIC | Age: 72
End: 2017-07-31

## 2017-07-31 ENCOUNTER — HOSPITAL ENCOUNTER (OUTPATIENT)
Dept: GENERAL RADIOLOGY | Facility: HOSPITAL | Age: 72
Discharge: HOME | End: 2017-07-31
Attending: INTERNAL MEDICINE | Admitting: INTERNAL MEDICINE
Payer: MEDICARE

## 2017-07-31 ENCOUNTER — TELEPHONE (OUTPATIENT)
Dept: INTERNAL MEDICINE CLINIC | Facility: CLINIC | Age: 72
End: 2017-07-31

## 2017-07-31 VITALS
HEART RATE: 60 BPM | BODY MASS INDEX: 40.07 KG/M2 | TEMPERATURE: 99 F | WEIGHT: 215 LBS | HEIGHT: 61.5 IN | DIASTOLIC BLOOD PRESSURE: 43 MMHG | SYSTOLIC BLOOD PRESSURE: 75 MMHG | RESPIRATION RATE: 12 BRPM

## 2017-07-31 DIAGNOSIS — M79.672 LEFT FOOT PAIN: Primary | ICD-10-CM

## 2017-07-31 DIAGNOSIS — M79.672 LEFT FOOT PAIN: ICD-10-CM

## 2017-07-31 PROCEDURE — 73620 X-RAY EXAM OF FOOT: CPT | Performed by: INTERNAL MEDICINE

## 2017-07-31 PROCEDURE — 99214 OFFICE O/P EST MOD 30 MIN: CPT | Performed by: INTERNAL MEDICINE

## 2017-07-31 PROCEDURE — G0463 HOSPITAL OUTPT CLINIC VISIT: HCPCS | Performed by: INTERNAL MEDICINE

## 2017-07-31 RX ORDER — ACETAMINOPHEN AND CODEINE PHOSPHATE 300; 30 MG/1; MG/1
1 TABLET ORAL EVERY 6 HOURS PRN
Qty: 30 TABLET | Refills: 0 | Status: SHIPPED | OUTPATIENT
Start: 2017-07-31 | End: 2018-04-17 | Stop reason: ALTCHOICE

## 2017-07-31 NOTE — PROGRESS NOTES
HPI:    Patient ID: Hugh Tejada is a 67year old female. Foot Pain    The pain is present in the left foot. This is a new problem. The current episode started in the past 7 days. There has been no history of extremity trauma.  The problem occurs [place] bumetanide 2 MG Oral Tab Take 1 tablet (2 mg total) by mouth daily.  Disp: 90 tablet Rfl: 1   MICROLET LANCETS Does not apply Misc TESTING TID Disp:  Rfl: 0   Lancet Device Does not apply Misc Use as directed 3 times a day Disp: 300 each Rfl: 3   Nebulizers Pt has localized tenderness on the dorsal foot in the area of the 2nd and 3rd metatarsal bone so I told her luis antonio need to rule out stress fracture (pt states she had been favoring her right foot last week).  We will get xray of the left foot; pt given t

## 2017-07-31 NOTE — TELEPHONE ENCOUNTER
Actions Requested: appointment made for today at 1:15pm with Dr Josef Riddle in [address] [place] [address]. Problem: right foot pain and swelling  Onset and Timing: Since Friday 7/28/17  Associated Symptoms: today top of left foot is tender  Aggravating by:    Alleviated by:

## 2017-08-01 ENCOUNTER — TELEPHONE (OUTPATIENT)
Dept: INTERNAL MEDICINE CLINIC | Facility: CLINIC | Age: 72
End: 2017-08-01

## 2017-08-01 NOTE — TELEPHONE ENCOUNTER
pls call pt; her left foot xray showed no fracture; showed only degenerative arthritis; continue with pain med as given. May also try cold compress on the foot. Call back if pain persist/wrosens.

## 2017-08-08 ENCOUNTER — PATIENT OUTREACH (OUTPATIENT)
Dept: INTERNAL MEDICINE CLINIC | Facility: CLINIC | Age: 72
End: 2017-08-08

## 2017-08-10 ENCOUNTER — TELEPHONE (OUTPATIENT)
Dept: ENDOCRINOLOGY CLINIC | Facility: CLINIC | Age: 72
End: 2017-08-10

## 2017-08-10 NOTE — TELEPHONE ENCOUNTER
Pt has appt on 8/14/17 with AM and might not make appt because she has to see surgeon about a hole in her retina. Pt is concerned that her refill on her insulin will be denied if she misses her appt (pt is not out of meds but will be in 2 wks).   Please ca

## 2017-08-11 ENCOUNTER — PRIOR ORIGINAL RECORDS (OUTPATIENT)
Dept: OTHER | Age: 72
End: 2017-08-11

## 2017-08-11 NOTE — TELEPHONE ENCOUNTER
Spoke with Leann. She needs to cancel her appt his Monday because she is seeing an eye surgeon for a consult at 1pm. She is going to have retinal surgery and she is not sure when. She will find out from the doctor on Monday.  She will call on Monday to res

## 2017-08-14 ENCOUNTER — PRIOR ORIGINAL RECORDS (OUTPATIENT)
Dept: OTHER | Age: 72
End: 2017-08-14

## 2017-08-14 LAB — GLUCOSE: 134 MG/DL

## 2017-08-27 RX ORDER — CARVEDILOL 12.5 MG/1
TABLET ORAL
Qty: 360 TABLET | Refills: 0 | Status: SHIPPED | OUTPATIENT
Start: 2017-08-27 | End: 2017-08-30

## 2017-08-28 ENCOUNTER — OFFICE VISIT (OUTPATIENT)
Dept: ENDOCRINOLOGY CLINIC | Facility: CLINIC | Age: 72
End: 2017-08-28

## 2017-08-28 VITALS
SYSTOLIC BLOOD PRESSURE: 117 MMHG | HEIGHT: 61 IN | HEART RATE: 71 BPM | DIASTOLIC BLOOD PRESSURE: 64 MMHG | BODY MASS INDEX: 39.61 KG/M2 | WEIGHT: 209.81 LBS

## 2017-08-28 DIAGNOSIS — Z79.4 UNCONTROLLED TYPE 2 DIABETES MELLITUS WITH HYPERGLYCEMIA, WITH LONG-TERM CURRENT USE OF INSULIN (HCC): Primary | ICD-10-CM

## 2017-08-28 DIAGNOSIS — E78.5 DYSLIPIDEMIA ASSOCIATED WITH TYPE 2 DIABETES MELLITUS (HCC): ICD-10-CM

## 2017-08-28 DIAGNOSIS — E11.69 DYSLIPIDEMIA ASSOCIATED WITH TYPE 2 DIABETES MELLITUS (HCC): ICD-10-CM

## 2017-08-28 DIAGNOSIS — E11.65 UNCONTROLLED TYPE 2 DIABETES MELLITUS WITH HYPERGLYCEMIA, WITH LONG-TERM CURRENT USE OF INSULIN (HCC): Primary | ICD-10-CM

## 2017-08-28 LAB
CARTRIDGE LOT#: ABNORMAL NUMERIC
GLUCOSE BLOOD: 193
HEMOGLOBIN A1C: 7.6 % (ref 4.3–5.6)
TEST STRIP LOT #: NORMAL NUMERIC

## 2017-08-28 PROCEDURE — 36416 COLLJ CAPILLARY BLOOD SPEC: CPT | Performed by: INTERNAL MEDICINE

## 2017-08-28 PROCEDURE — 82962 GLUCOSE BLOOD TEST: CPT | Performed by: INTERNAL MEDICINE

## 2017-08-28 PROCEDURE — 83036 HEMOGLOBIN GLYCOSYLATED A1C: CPT | Performed by: INTERNAL MEDICINE

## 2017-08-28 PROCEDURE — 99214 OFFICE O/P EST MOD 30 MIN: CPT | Performed by: INTERNAL MEDICINE

## 2017-08-28 RX ORDER — PEN NEEDLE, DIABETIC 30 GX3/16"
1 NEEDLE, DISPOSABLE MISCELLANEOUS 2 TIMES DAILY
COMMUNITY
End: 2017-08-28

## 2017-08-28 RX ORDER — PEN NEEDLE, DIABETIC 30 GX3/16"
1 NEEDLE, DISPOSABLE MISCELLANEOUS 2 TIMES DAILY
Qty: 200 EACH | Refills: 1 | Status: SHIPPED | OUTPATIENT
Start: 2017-08-28 | End: 2019-01-01

## 2017-08-29 RX ORDER — CARVEDILOL 12.5 MG/1
TABLET ORAL
Qty: 360 TABLET | Refills: 1 | OUTPATIENT
Start: 2017-08-29

## 2017-08-30 RX ORDER — CARVEDILOL 12.5 MG/1
TABLET ORAL
Qty: 360 TABLET | Refills: 0 | Status: SHIPPED | OUTPATIENT
Start: 2017-08-30 | End: 2017-11-24

## 2017-08-30 NOTE — TELEPHONE ENCOUNTER
Hypertensive Medications: Refilled per protocol    Protocol Criteria:  · Appointment scheduled in the past 6 months or in the next 3 months  · BMP or CMP in the past 12 months  · Creatinine result < 2  Recent Outpatient Visits            2 days ago Uncontr

## 2017-09-11 ENCOUNTER — PRIOR ORIGINAL RECORDS (OUTPATIENT)
Dept: OTHER | Age: 72
End: 2017-09-11

## 2017-09-11 ENCOUNTER — MYAURORA ACCOUNT LINK (OUTPATIENT)
Dept: OTHER | Age: 72
End: 2017-09-11

## 2017-09-30 ENCOUNTER — PRIOR ORIGINAL RECORDS (OUTPATIENT)
Dept: OTHER | Age: 72
End: 2017-09-30

## 2017-09-30 ENCOUNTER — LAB ENCOUNTER (OUTPATIENT)
Dept: LAB | Facility: HOSPITAL | Age: 72
End: 2017-09-30
Attending: INTERNAL MEDICINE
Payer: MEDICARE

## 2017-09-30 DIAGNOSIS — I48.20 CHRONIC ATRIAL FIBRILLATION (HCC): ICD-10-CM

## 2017-09-30 DIAGNOSIS — I50.20 SYSTOLIC HEART FAILURE (HCC): Primary | ICD-10-CM

## 2017-09-30 PROCEDURE — 85025 COMPLETE CBC W/AUTO DIFF WBC: CPT

## 2017-09-30 PROCEDURE — 36415 COLL VENOUS BLD VENIPUNCTURE: CPT

## 2017-09-30 PROCEDURE — 80048 BASIC METABOLIC PNL TOTAL CA: CPT

## 2017-10-02 LAB
BUN: 39 MG/DL
CALCIUM: 9.2 MG/DL
CHLORIDE: 97 MEQ/L
CREATININE, SERUM: 1.32 MG/DL
GLUCOSE: 235 MG/DL
HEMATOCRIT: 33.6 %
HEMOGLOBIN: 10.8 G/DL
PLATELETS: 209 K/UL
POTASSIUM, SERUM: 4.5 MEQ/L
RED BLOOD COUNT: 3.81 X 10-6/U
SODIUM: 136 MEQ/L
WHITE BLOOD COUNT: 7.4 X 10-3/U

## 2017-10-03 ENCOUNTER — PRIOR ORIGINAL RECORDS (OUTPATIENT)
Dept: OTHER | Age: 72
End: 2017-10-03

## 2017-10-30 ENCOUNTER — TELEPHONE (OUTPATIENT)
Dept: ENDOCRINOLOGY CLINIC | Facility: CLINIC | Age: 72
End: 2017-10-30

## 2017-10-30 NOTE — TELEPHONE ENCOUNTER
Pt requesting refill for rx:contour test strips (not on list) pt requesting rx to be sent to [facility], pt only has 6 left and requesting to process asap. No need to call unless any questions call:[phone],thanks.

## 2017-10-31 NOTE — TELEPHONE ENCOUNTER
Refilled per protocol.    Refill Protocol Appointment Criteria  · Appointment scheduled in the past 12 months or in the next 3 months  Recent Outpatient Visits            2 months ago Uncontrolled type 2 diabetes mellitus with hyperglycemia, with long-term

## 2017-11-05 ENCOUNTER — APPOINTMENT (OUTPATIENT)
Dept: GENERAL RADIOLOGY | Age: 72
End: 2017-11-05
Attending: EMERGENCY MEDICINE
Payer: MEDICARE

## 2017-11-05 ENCOUNTER — HOSPITAL ENCOUNTER (OUTPATIENT)
Age: 72
Discharge: HOME | End: 2017-11-05
Attending: EMERGENCY MEDICINE
Payer: MEDICARE

## 2017-11-05 VITALS
HEART RATE: 70 BPM | OXYGEN SATURATION: 95 % | SYSTOLIC BLOOD PRESSURE: 106 MMHG | DIASTOLIC BLOOD PRESSURE: 54 MMHG | RESPIRATION RATE: 16 BRPM | TEMPERATURE: 98 F | WEIGHT: 210 LBS | BODY MASS INDEX: 37.21 KG/M2 | HEIGHT: 63 IN

## 2017-11-05 DIAGNOSIS — M25.571 ACUTE RIGHT ANKLE PAIN: Primary | ICD-10-CM

## 2017-11-05 PROCEDURE — 99214 OFFICE O/P EST MOD 30 MIN: CPT

## 2017-11-05 PROCEDURE — 99213 OFFICE O/P EST LOW 20 MIN: CPT

## 2017-11-05 PROCEDURE — 73610 X-RAY EXAM OF ANKLE: CPT | Performed by: EMERGENCY MEDICINE

## 2017-11-05 PROCEDURE — 73630 X-RAY EXAM OF FOOT: CPT | Performed by: EMERGENCY MEDICINE

## 2017-11-05 RX ORDER — TRAMADOL HYDROCHLORIDE 50 MG/1
TABLET ORAL EVERY 8 HOURS PRN
Qty: 12 TABLET | Refills: 0 | Status: SHIPPED | OUTPATIENT
Start: 2017-11-05 | End: 2017-11-07

## 2017-11-05 NOTE — ED PROVIDER NOTES
Patient Seen in: [facility] In [address] [place] [address]    History   Patient presents with:  Lower Extremity Injury (musculoskeletal)    Stated Complaint: Rt Foot/Ankle Pain    HPI  She complains of 3 days of progressive pain and swelling to her ri Smokeless tobacco: Former User                     Alcohol use: No                Review of Systems    Positive for stated complaint: Rt Foot/Ankle Pain  Other systems are as noted in HPI. Constitutional and vital signs reviewed.       All other systems re 1. No radiographically visible acute osseous injury of the right foot. 2. Demineralization. 3. Mild first metatarsophalangeal joint osteoarthritis.   4. Plantar calcaneal spur.               XR ANKLE (MIN 3 VIEWS), RIGHT (CPT=73610) (Final result)   Resul Schedule an appointment as soon as possible for a visit in 1 day  For follow-up.   Call at 8 AM and let them know Dr. Fortino Hardy would like you reevaluated tomorrow      Medications Prescribed:  Current Discharge Medication List    START taking these medica

## 2017-11-05 NOTE — ED NOTES
Refuses admission will follow up with pcp in am call office and be seen. If pain is worse, fever or increased immobility call 911 or go to the  Emergency department.

## 2017-11-05 NOTE — ED INITIAL ASSESSMENT (HPI)
Since Thursday started having trouble with weight bearing on rt ankle- with incresed pain since then took a pain med last night but was unable to sleep took her 15 min to get to washroom.  This am extensive med history  Swelling in foot also - good pedal pu

## 2017-11-06 ENCOUNTER — NURSE TRIAGE (OUTPATIENT)
Dept: OTHER | Age: 72
End: 2017-11-06

## 2017-11-06 ENCOUNTER — OFFICE VISIT (OUTPATIENT)
Dept: INTERNAL MEDICINE CLINIC | Facility: CLINIC | Age: 72
End: 2017-11-06

## 2017-11-06 ENCOUNTER — LAB ENCOUNTER (OUTPATIENT)
Dept: LAB | Facility: HOSPITAL | Age: 72
End: 2017-11-06
Attending: PHYSICIAN ASSISTANT
Payer: MEDICARE

## 2017-11-06 VITALS
DIASTOLIC BLOOD PRESSURE: 66 MMHG | RESPIRATION RATE: 16 BRPM | SYSTOLIC BLOOD PRESSURE: 120 MMHG | HEART RATE: 78 BPM | TEMPERATURE: 99 F

## 2017-11-06 DIAGNOSIS — M79.671 RIGHT FOOT PAIN: Primary | ICD-10-CM

## 2017-11-06 DIAGNOSIS — M79.672 LEFT FOOT PAIN: ICD-10-CM

## 2017-11-06 PROCEDURE — 84550 ASSAY OF BLOOD/URIC ACID: CPT

## 2017-11-06 PROCEDURE — 36415 COLL VENOUS BLD VENIPUNCTURE: CPT

## 2017-11-06 PROCEDURE — 85025 COMPLETE CBC W/AUTO DIFF WBC: CPT

## 2017-11-06 PROCEDURE — 99213 OFFICE O/P EST LOW 20 MIN: CPT | Performed by: PHYSICIAN ASSISTANT

## 2017-11-06 RX ORDER — METHYLPREDNISOLONE 4 MG/1
TABLET ORAL
Qty: 1 KIT | Refills: 0 | Status: SHIPPED | OUTPATIENT
Start: 2017-11-06 | End: 2018-01-22 | Stop reason: ALTCHOICE

## 2017-11-06 RX ORDER — CEPHALEXIN 500 MG/1
500 CAPSULE ORAL 4 TIMES DAILY
Qty: 28 CAPSULE | Refills: 0 | Status: SHIPPED | OUTPATIENT
Start: 2017-11-06 | End: 2017-11-13

## 2017-11-06 NOTE — ASSESSMENT & PLAN NOTE
Worsening right foot pain, redness, and swelling. Now almost completely unable to bear weight. No fever or chills. Recent x-rays of right foot and ankle unremarkable. Etiology unclear, possible cellulitis vs gout.  Unlikely DVT, is currently on xarelto and

## 2017-11-06 NOTE — PROGRESS NOTES
HPI:    Patient ID: Elvin Cortes is a 67year old female. Patient has history of CAD, hypertension, hyperlipidemia, DM-2, and CHF and presents today complaining of worsening right foot pain and swelling over the last 4 days.  Was seen at [address] [place] [address] yesterday fo Rfl: 0   SEAN CONTOUR TEST In Vitro Strip USE 2 TO 3 TIMES DAILY AS DIRECTED Disp: 100 strip Rfl: 3   SEAN CONTOUR TEST In Vitro Strip Check blood sugar three times daily Disp: 100 strip Rfl: 2   CARVEDILOL 12.5 MG Oral Tab TAKE 2 TABLETS BY MOUTH IN THE  MG/30ML Oral Liquid Take  by mouth. Disp:  Rfl:      Allergies:  Bupropion               Unknown  Iodine  [Gnp Iodine]    Unknown   PHYSICAL EXAM:   Physical Exam   Nursing note and vitals reviewed.    Constitutional: She appears well-developed and CBC W Differential W Platelet [E]      Uric Acid, Serum [E]    Meds This Visit:  Signed Prescriptions Disp Refills    methylPREDNISolone (MEDROL) 4 MG Oral Tablet Therapy Pack 1 kit 0      Sig: As directed.       cephALEXin 500 MG Oral Cap 28 capsule 0

## 2017-11-06 NOTE — TELEPHONE ENCOUNTER
Action Requested: Summary for Provider     []  Critical Lab, Recommendations Needed  [] Need Additional Advice  [x]   FYI    []   Need Orders  [] Need Medications Sent to Pharmacy  []  Other     SUMMARY: Pt was seen at [facility] 11/5/17 for right foot pain.  Dung Jones

## 2017-11-06 NOTE — PATIENT INSTRUCTIONS
Problem List Items Addressed This Visit        Musculoskeletal    Right foot pain - Primary     Worsening right foot pain, redness, and swelling. Now almost completely unable to bear weight. No fever or chills.  Recent x-rays of right foot and ankle unremar

## 2017-11-08 ENCOUNTER — TELEPHONE (OUTPATIENT)
Dept: OTHER | Age: 72
End: 2017-11-08

## 2017-11-08 ENCOUNTER — TELEPHONE (OUTPATIENT)
Dept: ENDOCRINOLOGY CLINIC | Facility: CLINIC | Age: 72
End: 2017-11-08

## 2017-11-08 NOTE — TELEPHONE ENCOUNTER
Pt is concerned because her blood sugar readings are high, pls call at:[phone],thanks    Date Breakfast  Lunch Now  11/08 [phone]

## 2017-11-08 NOTE — TELEPHONE ENCOUNTER
Reviewed doctor's recommendations with pt, pt agreed with plan of care, states she has a hard time restricting her diet and had a piece of coffee cake last evening. Pt states her blood sugar was 405 this morning.  Advised pt to call Dr. Julien Dietz office w

## 2017-11-08 NOTE — TELEPHONE ENCOUNTER
Noted. She should continue both the medrol dose pack and antibiotics until completed. I also discussed the effect of steroids on blood sugar with the patient and advised her to maintain strict diet control while on the medication.  She should continue to mo

## 2017-11-08 NOTE — TELEPHONE ENCOUNTER
Pt states she was advised to call with update on her right foot pain at 11/6/17 OV. Pt states she was given pain meds when seen in IC on Sunday 11/6/17.  Pt states she is doing much better today, still has some pain when putting pressure on her foot, still

## 2017-11-08 NOTE — TELEPHONE ENCOUNTER
Per doctor on call - advised patient to take Humulin 70/30 -100 units w/ dinner tonight (6pm), recheck BG in 2 hrs, call doctor with BG tonight (8pm). Phone number provided. Patient read back instructions correctly and verbalized instructions.     Pt called

## 2017-11-09 ENCOUNTER — TELEPHONE (OUTPATIENT)
Dept: ENDOCRINOLOGY CLINIC | Facility: CLINIC | Age: 72
End: 2017-11-09

## 2017-11-09 NOTE — TELEPHONE ENCOUNTER
STACYI for Dr. Acacia Nuñze - Pt states that her sugar was 180 this morning. Before lunch it was 100. Please call if any questions.

## 2017-11-09 NOTE — TELEPHONE ENCOUNTER
Spoke with SH and she would like patient to now resume normal dose of 70/30 insulin - 68 units BID. Informed patient and she will resume that dose this evening.

## 2017-11-09 NOTE — TELEPHONE ENCOUNTER
Spoke with Leann.   BB    Plan per physician on call:  Humulin 70/30 - 70 units this am w/ breakfast  Stop steroid med  C/w antibiotic  Call to sched f/u apt w/ PCP    Call RN back this afternoon w/ BG before 5pm today    Enc forwarded to both Endocr

## 2017-11-09 NOTE — TELEPHONE ENCOUNTER
Routed to AM as FYI - see previous encounters [facility] did make adjustments while on call due to hyperglycemia.

## 2017-11-09 NOTE — TELEPHONE ENCOUNTER
Agree with plan below. Patient called 2 hours after dinner and BG level was 580. Discussed ER evaluation but patient refused and she was currently asymptomatic.   Discussed importance of increased hydration and recheck BG level in 90 minutes and improved

## 2017-11-14 ENCOUNTER — TELEPHONE (OUTPATIENT)
Dept: OTHER | Age: 72
End: 2017-11-14

## 2017-11-14 RX ORDER — COLCHICINE 0.6 MG/1
0.6 TABLET ORAL DAILY
Qty: 30 TABLET | Refills: 0 | Status: SHIPPED | OUTPATIENT
Start: 2017-11-14 | End: 2017-11-24

## 2017-11-20 RX ORDER — INSULIN HUMAN 100 [IU]/ML
INJECTION, SUSPENSION SUBCUTANEOUS
Qty: 45 ML | Refills: 0 | Status: SHIPPED | OUTPATIENT
Start: 2017-11-20 | End: 2018-01-08

## 2017-11-24 ENCOUNTER — OFFICE VISIT (OUTPATIENT)
Dept: INTERNAL MEDICINE CLINIC | Facility: CLINIC | Age: 72
End: 2017-11-24

## 2017-11-24 VITALS
TEMPERATURE: 98 F | RESPIRATION RATE: 16 BRPM | HEART RATE: 79 BPM | DIASTOLIC BLOOD PRESSURE: 67 MMHG | WEIGHT: 211 LBS | SYSTOLIC BLOOD PRESSURE: 116 MMHG | OXYGEN SATURATION: 94 % | HEIGHT: 62 IN | BODY MASS INDEX: 38.83 KG/M2

## 2017-11-24 DIAGNOSIS — I42.9 PRIMARY CARDIOMYOPATHY (HCC): ICD-10-CM

## 2017-11-24 DIAGNOSIS — M10.372 ACUTE GOUT DUE TO RENAL IMPAIRMENT INVOLVING TOE OF LEFT FOOT: Primary | ICD-10-CM

## 2017-11-24 DIAGNOSIS — I10 ESSENTIAL HYPERTENSION: ICD-10-CM

## 2017-11-24 DIAGNOSIS — E11.9 TYPE 2 DIABETES MELLITUS WITHOUT COMPLICATION, WITH LONG-TERM CURRENT USE OF INSULIN (HCC): ICD-10-CM

## 2017-11-24 DIAGNOSIS — Z23 NEED FOR VACCINATION: ICD-10-CM

## 2017-11-24 DIAGNOSIS — N18.30 STAGE 3 CHRONIC KIDNEY DISEASE (HCC): ICD-10-CM

## 2017-11-24 DIAGNOSIS — E78.00 HYPERCHOLESTEREMIA: ICD-10-CM

## 2017-11-24 DIAGNOSIS — Z79.4 TYPE 2 DIABETES MELLITUS WITHOUT COMPLICATION, WITH LONG-TERM CURRENT USE OF INSULIN (HCC): ICD-10-CM

## 2017-11-24 PROCEDURE — G0009 ADMIN PNEUMOCOCCAL VACCINE: HCPCS | Performed by: INTERNAL MEDICINE

## 2017-11-24 PROCEDURE — G0463 HOSPITAL OUTPT CLINIC VISIT: HCPCS | Performed by: INTERNAL MEDICINE

## 2017-11-24 PROCEDURE — 90670 PCV13 VACCINE IM: CPT | Performed by: INTERNAL MEDICINE

## 2017-11-24 PROCEDURE — 99214 OFFICE O/P EST MOD 30 MIN: CPT | Performed by: INTERNAL MEDICINE

## 2017-11-24 RX ORDER — SIMVASTATIN 40 MG
TABLET ORAL
Qty: 90 TABLET | Refills: 1 | Status: SHIPPED | OUTPATIENT
Start: 2017-11-24 | End: 2018-09-01

## 2017-11-24 RX ORDER — DIGOXIN 125 MCG
125 TABLET ORAL
Qty: 90 TABLET | Refills: 1 | Status: SHIPPED | OUTPATIENT
Start: 2017-11-24 | End: 2018-06-18

## 2017-11-24 RX ORDER — CARVEDILOL 12.5 MG/1
TABLET ORAL
Qty: 360 TABLET | Refills: 0 | Status: SHIPPED | OUTPATIENT
Start: 2017-11-24 | End: 2018-03-02

## 2017-11-24 RX ORDER — LISINOPRIL 2.5 MG/1
2.5 TABLET ORAL DAILY
Qty: 90 TABLET | Refills: 1 | Status: ON HOLD | OUTPATIENT
Start: 2017-11-24 | End: 2018-04-08

## 2017-11-25 NOTE — PROGRESS NOTES
HPI:    Patient ID: Brandee Pugh is a 67year old female. Foot Pain    The pain is present in the left toes, right ankle and right foot. This is a recurrent problem. The current episode started more than 1 month ago.  There has been no history of extrem 90 tablet Rfl: 1   simvastatin 40 MG Oral Tab TAKE 1 TABLET BY MOUTH EVERY EVENING Disp: 90 tablet Rfl: 1   SEAN CONTOUR TEST In Vitro Strip USE 2 TO 3 TIMES DAILY AS DIRECTED Disp: 100 strip Rfl: 3   SEAN CONTOUR TEST In Vitro Strip Check blood sugar th No distress. obese   HENT:   Right Ear: External ear normal.   Left Ear: External ear normal.   Nose: Nose normal.   Mouth/Throat: Oropharynx is clear and moist. No oropharyngeal exudate. Eyes: Conjunctivae are normal. Right eye exhibits no discharge. lisinopril, beta blocker, diuretics.    (E11.9,  Z79.4) Type 2 diabetes mellitus without complication, with long-term current use of insulin (HCC)  Plan: pt followed by endo Dr Dhaval Yousif.     (Z23) Need for vaccination  Plan: PNEUMOCOCCAL VACC, 13 RINA IM

## 2017-11-27 ENCOUNTER — MYAURORA ACCOUNT LINK (OUTPATIENT)
Dept: OTHER | Age: 72
End: 2017-11-27

## 2017-12-27 ENCOUNTER — TELEPHONE (OUTPATIENT)
Dept: OTHER | Age: 72
End: 2017-12-27

## 2017-12-27 NOTE — TELEPHONE ENCOUNTER
Would stop it since she is already a better; as I had told her before, colchicine can interact with her chol med and also given her chronic kidney disease, would avoid or limit using colchicine.   The reason why I also want her to see rheumatologist is her

## 2017-12-27 NOTE — TELEPHONE ENCOUNTER
Spoke with patient and she states she resumed taking 12/25/17 Colchicine after gout pain to her right great toe returned. Patient states ,\"The pain is much better. I am not having pain right now, even with a shoe on.  I'm not getting my shot in the bone--t

## 2017-12-30 NOTE — TELEPHONE ENCOUNTER
LMTCB transfer to triage    Note      Would stop it since she is already a better; as I had told her before, colchicine can interact with her chol med and also given her chronic kidney disease, would avoid or limit using colchicine.   The reason why I also

## 2017-12-30 NOTE — TELEPHONE ENCOUNTER
Pt was inform of  message below. She stated that she has stopped the medication she only took it for 4 days. She is feeling better. She will call Rheumatology.

## 2018-01-01 ENCOUNTER — APPOINTMENT (OUTPATIENT)
Dept: LAB | Age: 73
End: 2018-01-01
Attending: INTERNAL MEDICINE
Payer: MEDICARE

## 2018-01-01 ENCOUNTER — HOSPITAL ENCOUNTER (OUTPATIENT)
Dept: GENERAL RADIOLOGY | Facility: HOSPITAL | Age: 73
Discharge: HOME | End: 2018-01-01
Attending: INTERNAL MEDICINE
Payer: MEDICARE

## 2018-01-01 ENCOUNTER — TELEPHONE (OUTPATIENT)
Dept: OTHER | Age: 73
End: 2018-01-01

## 2018-01-01 ENCOUNTER — HOSPITAL ENCOUNTER (OUTPATIENT)
Dept: ULTRASOUND IMAGING | Age: 73
Discharge: HOME | End: 2018-01-01
Attending: INTERNAL MEDICINE
Payer: MEDICARE

## 2018-01-01 ENCOUNTER — OFFICE VISIT (OUTPATIENT)
Dept: INTERNAL MEDICINE CLINIC | Facility: CLINIC | Age: 73
End: 2018-01-01
Payer: MEDICARE

## 2018-01-01 ENCOUNTER — HOSPITAL ENCOUNTER (OUTPATIENT)
Dept: ULTRASOUND IMAGING | Facility: HOSPITAL | Age: 73
Discharge: HOME | End: 2018-01-01
Attending: INTERNAL MEDICINE | Admitting: INTERNAL MEDICINE
Payer: MEDICARE

## 2018-01-01 ENCOUNTER — PRIOR ORIGINAL RECORDS (OUTPATIENT)
Dept: OTHER | Age: 73
End: 2018-01-01

## 2018-01-01 ENCOUNTER — NURSE TRIAGE (OUTPATIENT)
Dept: OTHER | Age: 73
End: 2018-01-01

## 2018-01-01 ENCOUNTER — LAB ENCOUNTER (OUTPATIENT)
Dept: LAB | Facility: HOSPITAL | Age: 73
End: 2018-01-01
Attending: INTERNAL MEDICINE
Payer: MEDICARE

## 2018-01-01 ENCOUNTER — MYAURORA ACCOUNT LINK (OUTPATIENT)
Dept: OTHER | Age: 73
End: 2018-01-01

## 2018-01-01 ENCOUNTER — TELEPHONE (OUTPATIENT)
Dept: FAMILY MEDICINE CLINIC | Facility: CLINIC | Age: 73
End: 2018-01-01

## 2018-01-01 ENCOUNTER — TELEPHONE (OUTPATIENT)
Dept: INTERVENTIONAL RADIOLOGY/VASCULAR | Facility: HOSPITAL | Age: 73
End: 2018-01-01

## 2018-01-01 VITALS
TEMPERATURE: 98 F | HEART RATE: 70 BPM | RESPIRATION RATE: 12 BRPM | DIASTOLIC BLOOD PRESSURE: 63 MMHG | BODY MASS INDEX: 34.49 KG/M2 | WEIGHT: 202 LBS | HEIGHT: 64 IN | SYSTOLIC BLOOD PRESSURE: 100 MMHG

## 2018-01-01 VITALS
BODY MASS INDEX: 37.54 KG/M2 | HEIGHT: 62 IN | TEMPERATURE: 98 F | SYSTOLIC BLOOD PRESSURE: 90 MMHG | RESPIRATION RATE: 20 BRPM | DIASTOLIC BLOOD PRESSURE: 54 MMHG | WEIGHT: 204 LBS | HEART RATE: 68 BPM

## 2018-01-01 VITALS
TEMPERATURE: 98 F | DIASTOLIC BLOOD PRESSURE: 59 MMHG | SYSTOLIC BLOOD PRESSURE: 97 MMHG | WEIGHT: 202 LBS | BODY MASS INDEX: 35 KG/M2 | HEART RATE: 70 BPM

## 2018-01-01 DIAGNOSIS — R10.11 RUQ ABDOMINAL PAIN: ICD-10-CM

## 2018-01-01 DIAGNOSIS — I42.9 CARDIOMYOPATHY, UNSPECIFIED TYPE (HCC): ICD-10-CM

## 2018-01-01 DIAGNOSIS — N28.9 RENAL INSUFFICIENCY: Primary | ICD-10-CM

## 2018-01-01 DIAGNOSIS — M79.89 LEFT LEG SWELLING: Primary | ICD-10-CM

## 2018-01-01 DIAGNOSIS — Z79.4 TYPE 2 DIABETES MELLITUS WITH DIABETIC PERIPHERAL ANGIOPATHY WITHOUT GANGRENE, WITH LONG-TERM CURRENT USE OF INSULIN (HCC): ICD-10-CM

## 2018-01-01 DIAGNOSIS — M79.89 LEFT LEG SWELLING: ICD-10-CM

## 2018-01-01 DIAGNOSIS — N28.9 RENAL INSUFFICIENCY: ICD-10-CM

## 2018-01-01 DIAGNOSIS — E11.51 TYPE 2 DIABETES MELLITUS WITH DIABETIC PERIPHERAL ANGIOPATHY WITHOUT GANGRENE, WITH LONG-TERM CURRENT USE OF INSULIN (HCC): ICD-10-CM

## 2018-01-01 DIAGNOSIS — M10.372 ACUTE GOUT DUE TO RENAL IMPAIRMENT INVOLVING LEFT ANKLE: Primary | ICD-10-CM

## 2018-01-01 DIAGNOSIS — M25.572 ACUTE LEFT ANKLE PAIN: ICD-10-CM

## 2018-01-01 DIAGNOSIS — R10.13 EPIGASTRIC PAIN: Primary | ICD-10-CM

## 2018-01-01 DIAGNOSIS — I10 ESSENTIAL HYPERTENSION: ICD-10-CM

## 2018-01-01 DIAGNOSIS — I48.91 ATRIAL FIBRILLATION, UNSPECIFIED TYPE (HCC): ICD-10-CM

## 2018-01-01 DIAGNOSIS — M10.372 ACUTE GOUT DUE TO RENAL IMPAIRMENT INVOLVING LEFT ANKLE: ICD-10-CM

## 2018-01-01 DIAGNOSIS — R10.13 EPIGASTRIC PAIN: ICD-10-CM

## 2018-01-01 DIAGNOSIS — E16.2 HYPOGLYCEMIA: ICD-10-CM

## 2018-01-01 PROCEDURE — 80048 BASIC METABOLIC PNL TOTAL CA: CPT

## 2018-01-01 PROCEDURE — 99214 OFFICE O/P EST MOD 30 MIN: CPT | Performed by: INTERNAL MEDICINE

## 2018-01-01 PROCEDURE — 73560 X-RAY EXAM OF KNEE 1 OR 2: CPT | Performed by: INTERNAL MEDICINE

## 2018-01-01 PROCEDURE — 36415 COLL VENOUS BLD VENIPUNCTURE: CPT

## 2018-01-01 PROCEDURE — G0463 HOSPITAL OUTPT CLINIC VISIT: HCPCS | Performed by: INTERNAL MEDICINE

## 2018-01-01 PROCEDURE — 73610 X-RAY EXAM OF ANKLE: CPT | Performed by: INTERNAL MEDICINE

## 2018-01-01 PROCEDURE — 81001 URINALYSIS AUTO W/SCOPE: CPT

## 2018-01-01 PROCEDURE — 99213 OFFICE O/P EST LOW 20 MIN: CPT | Performed by: INTERNAL MEDICINE

## 2018-01-01 PROCEDURE — 84550 ASSAY OF BLOOD/URIC ACID: CPT

## 2018-01-01 PROCEDURE — 85025 COMPLETE CBC W/AUTO DIFF WBC: CPT

## 2018-01-01 PROCEDURE — 83690 ASSAY OF LIPASE: CPT

## 2018-01-01 PROCEDURE — 76770 US EXAM ABDO BACK WALL COMP: CPT | Performed by: INTERNAL MEDICINE

## 2018-01-01 PROCEDURE — 80076 HEPATIC FUNCTION PANEL: CPT

## 2018-01-01 PROCEDURE — 36416 COLLJ CAPILLARY BLOOD SPEC: CPT | Performed by: INTERNAL MEDICINE

## 2018-01-01 PROCEDURE — 80053 COMPREHEN METABOLIC PANEL: CPT

## 2018-01-01 PROCEDURE — 80162 ASSAY OF DIGOXIN TOTAL: CPT

## 2018-01-01 PROCEDURE — 93971 EXTREMITY STUDY: CPT | Performed by: INTERNAL MEDICINE

## 2018-01-01 PROCEDURE — 82962 GLUCOSE BLOOD TEST: CPT | Performed by: INTERNAL MEDICINE

## 2018-01-01 RX ORDER — DIGOXIN 125 MCG
0.12 TABLET ORAL
Status: ON HOLD | COMMUNITY
End: 2019-01-01

## 2018-01-01 RX ORDER — FUROSEMIDE 20 MG/1
TABLET ORAL
Qty: 180 TABLET | Refills: 0 | OUTPATIENT
Start: 2018-01-01

## 2018-01-01 RX ORDER — ACETAMINOPHEN AND CODEINE PHOSPHATE 300; 30 MG/1; MG/1
1 TABLET ORAL EVERY 4 HOURS PRN
Qty: 15 TABLET | Refills: 0 | Status: SHIPPED | OUTPATIENT
Start: 2018-01-01 | End: 2018-01-01

## 2018-01-01 RX ORDER — PREDNISONE 10 MG/1
10 TABLET ORAL DAILY
Qty: 7 TABLET | Refills: 0 | Status: ON HOLD | OUTPATIENT
Start: 2018-01-01 | End: 2019-01-01 | Stop reason: ALTCHOICE

## 2018-01-01 RX ORDER — FUROSEMIDE 20 MG/1
40 TABLET ORAL DAILY
Qty: 60 TABLET | Refills: 2 | Status: ON HOLD | OUTPATIENT
Start: 2018-01-01 | End: 2019-01-01

## 2018-01-01 RX ORDER — MONTELUKAST SODIUM 10 MG/1
TABLET ORAL
Qty: 90 TABLET | Refills: 0 | Status: SHIPPED | OUTPATIENT
Start: 2018-01-01 | End: 2019-01-01

## 2018-01-01 RX ORDER — PANTOPRAZOLE SODIUM 40 MG/1
40 TABLET, DELAYED RELEASE ORAL
Qty: 30 TABLET | Refills: 5 | Status: SHIPPED | OUTPATIENT
Start: 2018-01-01 | End: 2018-01-01

## 2018-01-01 RX ORDER — HYDROCODONE BITARTRATE AND ACETAMINOPHEN 5; 325 MG/1; MG/1
1 TABLET ORAL EVERY 4 HOURS PRN
Qty: 30 TABLET | Refills: 0 | OUTPATIENT
Start: 2018-01-01

## 2018-01-01 RX ORDER — CARVEDILOL 12.5 MG/1
TABLET ORAL
Refills: 0 | COMMUNITY
Start: 2018-08-15 | End: 2018-01-01

## 2018-01-01 RX ORDER — MONTELUKAST SODIUM 10 MG/1
TABLET ORAL
Qty: 90 TABLET | Refills: 0 | Status: SHIPPED | OUTPATIENT
Start: 2018-01-01 | End: 2018-01-01

## 2018-01-01 RX ORDER — ASPIRIN 81 MG/1
81 TABLET, CHEWABLE ORAL
Status: ON HOLD | COMMUNITY
End: 2019-01-01

## 2018-01-01 RX ORDER — HYDROCODONE BITARTRATE AND ACETAMINOPHEN 5; 325 MG/1; MG/1
1 TABLET ORAL EVERY 6 HOURS PRN
Qty: 20 TABLET | Refills: 0 | Status: SHIPPED | OUTPATIENT
Start: 2018-01-01 | End: 2019-01-01

## 2018-01-01 RX ORDER — FERROUS SULFATE 325(65) MG
325 TABLET ORAL
COMMUNITY
End: 2018-01-01

## 2018-01-01 RX ORDER — CARVEDILOL 12.5 MG/1
25 TABLET ORAL 2 TIMES DAILY
Status: ON HOLD | COMMUNITY
End: 2019-01-01

## 2018-01-01 RX ORDER — INSULIN HUMAN 100 [IU]/ML
INJECTION, SUSPENSION SUBCUTANEOUS
Qty: 120 ML | Refills: 0 | Status: SHIPPED | OUTPATIENT
Start: 2018-01-01 | End: 2019-01-01

## 2018-01-01 RX ORDER — ASCORBIC ACID 500 MG
500 TABLET ORAL DAILY
Status: ON HOLD | COMMUNITY
End: 2019-01-01

## 2018-01-01 RX ORDER — DIGOXIN 125 UG/1
TABLET ORAL
Qty: 90 TABLET | Refills: 1 | Status: ON HOLD | OUTPATIENT
Start: 2018-01-01 | End: 2019-01-01

## 2018-01-01 RX ORDER — LISINOPRIL 5 MG/1
TABLET ORAL
Refills: 4 | COMMUNITY
Start: 2018-08-13 | End: 2018-01-01

## 2018-01-01 RX ORDER — METOLAZONE 2.5 MG/1
TABLET ORAL
Qty: 30 TABLET | Refills: 0 | OUTPATIENT
Start: 2018-01-01

## 2018-01-01 RX ORDER — MONTELUKAST SODIUM 10 MG/1
10 TABLET ORAL
COMMUNITY
End: 2018-01-01

## 2018-01-01 RX ORDER — METOLAZONE 2.5 MG/1
2.5 TABLET ORAL SEE ADMIN INSTRUCTIONS
Qty: 8 TABLET | Refills: 0 | Status: ON HOLD | OUTPATIENT
Start: 2018-01-01 | End: 2019-01-01

## 2018-01-01 RX ORDER — HYDROCODONE BITARTRATE AND ACETAMINOPHEN 5; 325 MG/1; MG/1
1 TABLET ORAL EVERY 6 HOURS PRN
Qty: 20 TABLET | Refills: 0 | Status: SHIPPED | OUTPATIENT
Start: 2018-01-01 | End: 2018-01-01

## 2018-01-01 RX ORDER — SIMVASTATIN 40 MG
40 TABLET ORAL
COMMUNITY
End: 2018-01-01

## 2018-01-01 RX ORDER — PANTOPRAZOLE SODIUM 40 MG/1
40 TABLET, DELAYED RELEASE ORAL
Qty: 60 TABLET | Refills: 2 | Status: ON HOLD | OUTPATIENT
Start: 2018-01-01 | End: 2019-01-01

## 2018-01-01 RX ORDER — CARVEDILOL 12.5 MG/1
TABLET ORAL
Qty: 360 TABLET | Refills: 0 | Status: ON HOLD | OUTPATIENT
Start: 2018-01-01 | End: 2019-01-01

## 2018-01-01 RX ORDER — BUMETANIDE 2 MG/1
2 TABLET ORAL
COMMUNITY
End: 2018-01-01

## 2018-01-08 ENCOUNTER — TELEPHONE (OUTPATIENT)
Dept: ENDOCRINOLOGY CLINIC | Facility: CLINIC | Age: 73
End: 2018-01-08

## 2018-01-09 RX ORDER — INSULIN HUMAN 100 [IU]/ML
INJECTION, SUSPENSION SUBCUTANEOUS
Qty: 45 ML | Refills: 0 | Status: SHIPPED | OUTPATIENT
Start: 2018-01-09 | End: 2018-02-11

## 2018-01-09 NOTE — TELEPHONE ENCOUNTER
Please send letter to inform her that she is due for apt in next one month  Last refill was sent today  Thanks.

## 2018-01-09 NOTE — TELEPHONE ENCOUNTER
LOV 8/28/17 RT Dec . No F/U scheduled. [address] Left detailed message that she is due for a follow up. 1 month refill pending.

## 2018-01-22 ENCOUNTER — OFFICE VISIT (OUTPATIENT)
Dept: ENDOCRINOLOGY CLINIC | Facility: CLINIC | Age: 73
End: 2018-01-22

## 2018-01-22 ENCOUNTER — OFFICE VISIT (OUTPATIENT)
Dept: RHEUMATOLOGY | Facility: CLINIC | Age: 73
End: 2018-01-22

## 2018-01-22 ENCOUNTER — OFFICE VISIT (OUTPATIENT)
Dept: INTERNAL MEDICINE CLINIC | Facility: CLINIC | Age: 73
End: 2018-01-22

## 2018-01-22 VITALS
SYSTOLIC BLOOD PRESSURE: 104 MMHG | BODY MASS INDEX: 38.64 KG/M2 | DIASTOLIC BLOOD PRESSURE: 61 MMHG | HEART RATE: 70 BPM | HEIGHT: 62 IN | WEIGHT: 210 LBS

## 2018-01-22 VITALS
BODY MASS INDEX: 38.61 KG/M2 | HEART RATE: 64 BPM | HEIGHT: 62 IN | DIASTOLIC BLOOD PRESSURE: 62 MMHG | SYSTOLIC BLOOD PRESSURE: 106 MMHG | WEIGHT: 209.81 LBS

## 2018-01-22 VITALS
BODY MASS INDEX: 38.64 KG/M2 | SYSTOLIC BLOOD PRESSURE: 104 MMHG | WEIGHT: 210 LBS | DIASTOLIC BLOOD PRESSURE: 61 MMHG | HEART RATE: 70 BPM | HEIGHT: 62 IN

## 2018-01-22 DIAGNOSIS — M15.9 PRIMARY OSTEOARTHRITIS INVOLVING MULTIPLE JOINTS: ICD-10-CM

## 2018-01-22 DIAGNOSIS — D17.9 LIPOMA, UNSPECIFIED SITE: Primary | ICD-10-CM

## 2018-01-22 DIAGNOSIS — E11.65 UNCONTROLLED TYPE 2 DIABETES MELLITUS WITH HYPERGLYCEMIA, WITH LONG-TERM CURRENT USE OF INSULIN (HCC): Primary | ICD-10-CM

## 2018-01-22 DIAGNOSIS — Z79.4 TYPE 2 DIABETES MELLITUS WITH DIABETIC PERIPHERAL ANGIOPATHY WITHOUT GANGRENE, WITH LONG-TERM CURRENT USE OF INSULIN (HCC): ICD-10-CM

## 2018-01-22 DIAGNOSIS — Z79.4 UNCONTROLLED TYPE 2 DIABETES MELLITUS WITH HYPERGLYCEMIA, WITH LONG-TERM CURRENT USE OF INSULIN (HCC): Primary | ICD-10-CM

## 2018-01-22 DIAGNOSIS — M10.39 ACUTE GOUT DUE TO RENAL IMPAIRMENT INVOLVING MULTIPLE SITES: Primary | ICD-10-CM

## 2018-01-22 DIAGNOSIS — E78.5 DYSLIPIDEMIA: ICD-10-CM

## 2018-01-22 DIAGNOSIS — E11.51 TYPE 2 DIABETES MELLITUS WITH DIABETIC PERIPHERAL ANGIOPATHY WITHOUT GANGRENE, WITH LONG-TERM CURRENT USE OF INSULIN (HCC): ICD-10-CM

## 2018-01-22 DIAGNOSIS — R09.82 POST-NASAL DRIP: ICD-10-CM

## 2018-01-22 LAB
CARTRIDGE LOT#: ABNORMAL NUMERIC
GLUCOSE BLOOD: 205
HEMOGLOBIN A1C: 8.3 % (ref 4.3–5.6)
TEST STRIP LOT #: NORMAL NUMERIC

## 2018-01-22 PROCEDURE — 99214 OFFICE O/P EST MOD 30 MIN: CPT | Performed by: INTERNAL MEDICINE

## 2018-01-22 PROCEDURE — 36416 COLLJ CAPILLARY BLOOD SPEC: CPT | Performed by: INTERNAL MEDICINE

## 2018-01-22 PROCEDURE — G0463 HOSPITAL OUTPT CLINIC VISIT: HCPCS | Performed by: INTERNAL MEDICINE

## 2018-01-22 PROCEDURE — 99204 OFFICE O/P NEW MOD 45 MIN: CPT | Performed by: INTERNAL MEDICINE

## 2018-01-22 PROCEDURE — 82962 GLUCOSE BLOOD TEST: CPT | Performed by: INTERNAL MEDICINE

## 2018-01-22 PROCEDURE — 83036 HEMOGLOBIN GLYCOSYLATED A1C: CPT | Performed by: INTERNAL MEDICINE

## 2018-01-22 PROCEDURE — 99213 OFFICE O/P EST LOW 20 MIN: CPT | Performed by: PHYSICIAN ASSISTANT

## 2018-01-22 RX ORDER — COLCHICINE 0.6 MG/1
0.6 TABLET ORAL AS NEEDED
Status: ON HOLD | COMMUNITY
End: 2018-04-03

## 2018-01-22 NOTE — PROGRESS NOTES
Return Office Visit     CHIEF COMPLAINT:    Diabetes , Dyslipidemia    HISTORY OF PRESENT ILLNESS:  Bella Schumacher is a 67year old female who presents for follow up for for Diabetes.        DM HISTORY  Diagnosed around age 64    [address] Does not apply Misc 1 each by Does not apply route 2 (two) times daily.  Disp: 200 each Rfl: 1   MONTELUKAST SODIUM 10 MG Oral Tab TAKE 1 TABLET(10 MG) BY MOUTH EVERY DAY Disp: 90 tablet Rfl: 1   triamcinolone acetonide 0.1 % External Cream Apply topically frequency  Psychiatric: Negative for:  depression, anxiety  Hematology/Lymphatics: Negative for: bruising, lower extremity edema  Endocrine: Negative for: polyuria, polydypsia  Skin: Negative for: rash, blister, cellulitis,       PHYSICAL EXAM:    01/22/18 CKD. Discussed importance of good BG control   c). Instructed on importance of annual eye exams. d). Foot exam: Daily feet exam explained, Monofilament sensation normal  e).  BG log maintainence explained in great detail, to get log and glucometer on next

## 2018-01-22 NOTE — PROGRESS NOTES
Dear César Alfaro:    I saw your patient Mau Landin in consultation this afternoon at your request for evaluation of high uric acid and gout.   As you know, she is a delightful 77-year-old woman who in November of 2017, developed swelling, redness, and warmth, th extremity edema, carvedilol, colchicine 1 daily as needed for gout attack, digoxin, insulin twice a day, lisinopril, metolazone, Singulair, Xarelto, simvastatin. She is intolerant of bupropion and iodine.     Family history:  Her father  at age 72 and renal insufficiency is significant, with a creatinine of 1.32 (40). Her gout attacks have been infrequent and go away quickly with one colchicine daily as needed, so she decided to continue PRN colchicine for now.   She will tentatively schedule back in 4

## 2018-01-22 NOTE — PROGRESS NOTES
HPI:    Patient ID: Maria D Casillas is a 67year old female. HPI     Patient presents today with ongoing issues of right sided neck mass located in the right supraclavicular region. Ultrasound from 5/2017 shows a 6.2 X2.9X 4.9 cm hyperechoic mass.   CT sc IN THE EVENING Disp: 360 tablet Rfl: 0   lisinopril 2.5 MG Oral Tab Take 1 tablet (2.5 mg total) by mouth daily.  Disp: 90 tablet Rfl: 1   simvastatin 40 MG Oral Tab TAKE 1 TABLET BY MOUTH EVERY EVENING Disp: 90 tablet Rfl: 1   SEAN CONTOUR TEST In Vitro S Normocephalic and atraumatic. Right Ear: Tympanic membrane normal.   Left Ear: Tympanic membrane normal.   Nose: Mucosal edema present. Mouth/Throat: Oropharynx is clear and moist. Mucous membranes are dry.    Eyes: Conjunctivae are normal. Pupils are e

## 2018-02-07 RX ORDER — SIMVASTATIN 40 MG
TABLET ORAL
Qty: 90 TABLET | Refills: 0 | OUTPATIENT
Start: 2018-02-07

## 2018-02-07 NOTE — TELEPHONE ENCOUNTER
Spoke with  Brandi Jiang at [facility] and verified refill sent 11/24/17 per EL-refusing request.    Please advise on Triamcinalone cream--LR 6/26/17 for rash on back--pharmacy states patient is requesting refill    (L30.9) Dermatitis  Plan: Patient with

## 2018-02-12 RX ORDER — INSULIN HUMAN 100 [IU]/ML
INJECTION, SUSPENSION SUBCUTANEOUS
Qty: 45 ML | Refills: 0 | Status: SHIPPED | OUTPATIENT
Start: 2018-02-12 | End: 2018-03-14

## 2018-03-02 RX ORDER — CARVEDILOL 12.5 MG/1
TABLET ORAL
Qty: 360 TABLET | Refills: 0 | Status: SHIPPED | OUTPATIENT
Start: 2018-03-02 | End: 2018-05-21

## 2018-03-05 RX ORDER — MONTELUKAST SODIUM 10 MG/1
TABLET ORAL
Qty: 90 TABLET | Refills: 0 | Status: SHIPPED | OUTPATIENT
Start: 2018-03-05 | End: 2018-05-21

## 2018-03-13 ENCOUNTER — PRIOR ORIGINAL RECORDS (OUTPATIENT)
Dept: OTHER | Age: 73
End: 2018-03-13

## 2018-03-14 ENCOUNTER — TELEPHONE (OUTPATIENT)
Dept: ENDOCRINOLOGY CLINIC | Facility: CLINIC | Age: 73
End: 2018-03-14

## 2018-03-14 RX ORDER — INSULIN HUMAN 100 [IU]/ML
70 INJECTION, SUSPENSION SUBCUTANEOUS 2 TIMES DAILY WITH MEALS
Qty: 135 ML | Refills: 0 | Status: ON HOLD | OUTPATIENT
Start: 2018-03-14 | End: 2018-04-08

## 2018-03-14 NOTE — TELEPHONE ENCOUNTER
Spoke with dusty. Informed her of AM's instructions below. She verbalized her understanding. She needs a refill on her Humulin kwikpens and she is taking 70 units BID (her sugars were elevated due to gout attack so she increased dose by 2 units).  Advised

## 2018-03-14 NOTE — TELEPHONE ENCOUNTER
Pt states she forgot to take her insulin this am usually takes in the am before work and states she wont be home til later and would like to know what she should and shouldn't eat please call thank you

## 2018-03-14 NOTE — TELEPHONE ENCOUNTER
Please see below. OK to advise pt to eat a low carb high protein lunch and take inulin when she gets home? Per chart she is prescribed insulin 70/30 68 units BID.

## 2018-03-19 ENCOUNTER — OFFICE VISIT (OUTPATIENT)
Dept: INTERNAL MEDICINE CLINIC | Facility: CLINIC | Age: 73
End: 2018-03-19

## 2018-03-19 DIAGNOSIS — M25.561 ACUTE PAIN OF RIGHT KNEE: Primary | ICD-10-CM

## 2018-03-19 PROCEDURE — G0463 HOSPITAL OUTPT CLINIC VISIT: HCPCS | Performed by: INTERNAL MEDICINE

## 2018-03-19 PROCEDURE — 99213 OFFICE O/P EST LOW 20 MIN: CPT | Performed by: INTERNAL MEDICINE

## 2018-03-20 VITALS
BODY MASS INDEX: 37 KG/M2 | HEART RATE: 71 BPM | SYSTOLIC BLOOD PRESSURE: 105 MMHG | TEMPERATURE: 98 F | DIASTOLIC BLOOD PRESSURE: 61 MMHG | WEIGHT: 201 LBS

## 2018-03-20 NOTE — PROGRESS NOTES
HPI:    Patient ID: Monalisa Bowles is a 67year old female. Leg Pain    The pain is present in the right knee. This is a new (lower medial right leg ) problem. The current episode started in the past 7 days. There has been no history of extremity trauma. 2   Insulin Pen Needle (PEN NEEDLES) 31G X 8 MM Does not apply Misc 1 each by Does not apply route 2 (two) times daily. Disp: 200 each Rfl: 1   Acetaminophen-Codeine #3 300-30 MG Oral Tab Take 1 tablet by mouth every 6 (six) hours as needed for Pain.  Disp: having some improvement with taking tylehol #3 prn(refill given). I also gave her order for xray of knee. I told may consider seeing ortho for possible cortisone shot. No orders of the defined types were placed in this encounter.       Meds This Vis

## 2018-03-28 ENCOUNTER — NURSE TRIAGE (OUTPATIENT)
Dept: OTHER | Age: 73
End: 2018-03-28

## 2018-03-28 NOTE — TELEPHONE ENCOUNTER
Pt advised that she must go to the emergency room, advised this is emergent, pt absolutely refuses, I did advise that failure to follow this advise could result in death, she verbalized understanding and takes responsibility for not going to the ER.   She i

## 2018-03-28 NOTE — TELEPHONE ENCOUNTER
I dont have any openings today and this is an emergency already since she is on blood thinners and bleeding. She will need labs to be done and likely needs GI eval so needs to go to ER right away.

## 2018-03-28 NOTE — TELEPHONE ENCOUNTER
Action Requested: Summary for Provider     []  Critical Lab, Recommendations Needed  [x] Need Additional Advice  []   FYI    []   Need Orders  [] Need Medications Sent to Pharmacy  []  Other     SUMMARY: Pt c/o Blood in stool x 1 days.  Pt states Carson paola

## 2018-03-28 NOTE — TELEPHONE ENCOUNTER
I called the patient back again, she is refusing ER as she states she is feeling better and has not had a BM since 11 AM, she feels her stomach is better, she does not want to wait in the ER or be admitted. She does have a neighbor who could take her.   I

## 2018-03-28 NOTE — TELEPHONE ENCOUNTER
There is really nothing I can advise other than going to ER since she can be having upper GI bleed with her black tarry stools and with pt being on xarelto, puts her at higher risk of GI bleed.  Even  If she is seen in the clinic, she will still be sent to

## 2018-03-29 NOTE — TELEPHONE ENCOUNTER
The main issue is what is the cause of her bleeding. No blood test can show this and that is the reason she was being advised before to go to ER yesterday. She may need upper or lower endoscopy or both to find the cause and source of bleeding.  The fact she

## 2018-03-29 NOTE — TELEPHONE ENCOUNTER
Advised patient on Dr. Alphonse Casanova information and recommendations. Patient verbalized understanding. Advised the emergency room; patient declined.  Offered to make an appointment with Dr Anna Jolley; patient declined, not sure if she'll stay with the [facility]

## 2018-03-29 NOTE — TELEPHONE ENCOUNTER
I have reviewed the string of communications related to this patient's recent episode of possible GI bleed. I agree with advice given by  and nurse. Clearly patient is at increased risk of Xarelto.   Clearly patient would benefit from evaluation most l

## 2018-03-29 NOTE — TELEPHONE ENCOUNTER
Patient did not go to ER, was upset that EMS/police were sent to her home, \"terrible thing to do,\" stated she is an adult. She threatened a lawsuit if we send an ambulance again. Patient stated last stool was yesterday 3/28/18 at 10:30 am. Tired.  Slep

## 2018-03-29 NOTE — TELEPHONE ENCOUNTER
We had done every thing we can possibly can for her but if she doesn't want to ff our recommendation, there is really not much we can do.  She was a patient of Dr Arabella Loja for long time and would recommend we send this case to him being the Chief medical off

## 2018-04-03 ENCOUNTER — HOSPITAL ENCOUNTER (INPATIENT)
Facility: HOSPITAL | Age: 73
LOS: 5 days | Discharge: ANOTHER HOSPITAL | DRG: 378 | End: 2018-04-08
Attending: HOSPITALIST | Admitting: HOSPITALIST
Payer: MEDICARE

## 2018-04-03 ENCOUNTER — ANESTHESIA EVENT (OUTPATIENT)
Dept: ENDOSCOPY | Facility: HOSPITAL | Age: 73
DRG: 378 | End: 2018-04-03
Payer: MEDICARE

## 2018-04-03 ENCOUNTER — ANESTHESIA (OUTPATIENT)
Dept: ENDOSCOPY | Facility: HOSPITAL | Age: 73
DRG: 378 | End: 2018-04-03
Payer: MEDICARE

## 2018-04-03 ENCOUNTER — SURGERY (OUTPATIENT)
Age: 73
End: 2018-04-03

## 2018-04-03 DIAGNOSIS — K92.2 GASTROINTESTINAL HEMORRHAGE, UNSPECIFIED GASTROINTESTINAL HEMORRHAGE TYPE: ICD-10-CM

## 2018-04-03 DIAGNOSIS — K57.90 DIVERTICULOSIS OF INTESTINE WITHOUT BLEEDING, UNSPECIFIED INTESTINAL TRACT LOCATION: ICD-10-CM

## 2018-04-03 DIAGNOSIS — K63.5 POLYP OF COLON, UNSPECIFIED PART OF COLON, UNSPECIFIED TYPE: ICD-10-CM

## 2018-04-03 DIAGNOSIS — K25.9 MULTIPLE GASTRIC ULCERS: ICD-10-CM

## 2018-04-03 DIAGNOSIS — K64.9 HEMORRHOIDS, UNSPECIFIED HEMORRHOID TYPE: ICD-10-CM

## 2018-04-03 DIAGNOSIS — K31.9 GASTROPATHY: ICD-10-CM

## 2018-04-03 DIAGNOSIS — K92.2 UPPER GI BLEED: Primary | ICD-10-CM

## 2018-04-03 PROCEDURE — 0DB78ZX EXCISION OF STOMACH, PYLORUS, VIA NATURAL OR ARTIFICIAL OPENING ENDOSCOPIC, DIAGNOSTIC: ICD-10-PCS | Performed by: INTERNAL MEDICINE

## 2018-04-03 PROCEDURE — 43239 EGD BIOPSY SINGLE/MULTIPLE: CPT | Performed by: INTERNAL MEDICINE

## 2018-04-03 PROCEDURE — 99223 1ST HOSP IP/OBS HIGH 75: CPT | Performed by: INTERNAL MEDICINE

## 2018-04-03 PROCEDURE — 99223 1ST HOSP IP/OBS HIGH 75: CPT | Performed by: HOSPITALIST

## 2018-04-03 PROCEDURE — 0DB68ZX EXCISION OF STOMACH, VIA NATURAL OR ARTIFICIAL OPENING ENDOSCOPIC, DIAGNOSTIC: ICD-10-PCS | Performed by: INTERNAL MEDICINE

## 2018-04-03 RX ORDER — DEXTROSE MONOHYDRATE 25 G/50ML
50 INJECTION, SOLUTION INTRAVENOUS
Status: DISCONTINUED | OUTPATIENT
Start: 2018-04-03 | End: 2018-04-03 | Stop reason: HOSPADM

## 2018-04-03 RX ORDER — LIDOCAINE HYDROCHLORIDE 10 MG/ML
INJECTION, SOLUTION EPIDURAL; INFILTRATION; INTRACAUDAL; PERINEURAL AS NEEDED
Status: DISCONTINUED | OUTPATIENT
Start: 2018-04-03 | End: 2018-04-03 | Stop reason: SURG

## 2018-04-03 RX ORDER — HYDROMORPHONE HYDROCHLORIDE 1 MG/ML
0.2 INJECTION, SOLUTION INTRAMUSCULAR; INTRAVENOUS; SUBCUTANEOUS EVERY 5 MIN PRN
Status: DISCONTINUED | OUTPATIENT
Start: 2018-04-03 | End: 2018-04-03 | Stop reason: HOSPADM

## 2018-04-03 RX ORDER — SODIUM CHLORIDE, SODIUM LACTATE, POTASSIUM CHLORIDE, CALCIUM CHLORIDE 600; 310; 30; 20 MG/100ML; MG/100ML; MG/100ML; MG/100ML
INJECTION, SOLUTION INTRAVENOUS CONTINUOUS
Status: DISCONTINUED | OUTPATIENT
Start: 2018-04-03 | End: 2018-04-05

## 2018-04-03 RX ORDER — HYDROMORPHONE HYDROCHLORIDE 1 MG/ML
0.6 INJECTION, SOLUTION INTRAMUSCULAR; INTRAVENOUS; SUBCUTANEOUS EVERY 5 MIN PRN
Status: DISCONTINUED | OUTPATIENT
Start: 2018-04-03 | End: 2018-04-03 | Stop reason: HOSPADM

## 2018-04-03 RX ORDER — MORPHINE SULFATE 4 MG/ML
4 INJECTION, SOLUTION INTRAMUSCULAR; INTRAVENOUS EVERY 10 MIN PRN
Status: DISCONTINUED | OUTPATIENT
Start: 2018-04-03 | End: 2018-04-03 | Stop reason: HOSPADM

## 2018-04-03 RX ORDER — ACETAMINOPHEN 325 MG/1
650 TABLET ORAL EVERY 6 HOURS PRN
Status: DISCONTINUED | OUTPATIENT
Start: 2018-04-03 | End: 2018-04-08

## 2018-04-03 RX ORDER — ATORVASTATIN CALCIUM 20 MG/1
20 TABLET, FILM COATED ORAL NIGHTLY
Status: DISCONTINUED | OUTPATIENT
Start: 2018-04-03 | End: 2018-04-08

## 2018-04-03 RX ORDER — MORPHINE SULFATE 2 MG/ML
2 INJECTION, SOLUTION INTRAMUSCULAR; INTRAVENOUS EVERY 10 MIN PRN
Status: DISCONTINUED | OUTPATIENT
Start: 2018-04-03 | End: 2018-04-03 | Stop reason: HOSPADM

## 2018-04-03 RX ORDER — ACETAMINOPHEN AND CODEINE PHOSPHATE 300; 30 MG/1; MG/1
1 TABLET ORAL EVERY 6 HOURS PRN
Status: DISCONTINUED | OUTPATIENT
Start: 2018-04-03 | End: 2018-04-08

## 2018-04-03 RX ORDER — LISINOPRIL 5 MG/1
2.5 TABLET ORAL DAILY
Status: DISCONTINUED | OUTPATIENT
Start: 2018-04-04 | End: 2018-04-06

## 2018-04-03 RX ORDER — DIGOXIN 125 MCG
125 TABLET ORAL
Status: DISCONTINUED | OUTPATIENT
Start: 2018-04-04 | End: 2018-04-08

## 2018-04-03 RX ORDER — HYDROMORPHONE HYDROCHLORIDE 1 MG/ML
0.4 INJECTION, SOLUTION INTRAMUSCULAR; INTRAVENOUS; SUBCUTANEOUS EVERY 5 MIN PRN
Status: DISCONTINUED | OUTPATIENT
Start: 2018-04-03 | End: 2018-04-03 | Stop reason: HOSPADM

## 2018-04-03 RX ORDER — SODIUM CHLORIDE 9 MG/ML
INJECTION, SOLUTION INTRAVENOUS
Status: COMPLETED
Start: 2018-04-03 | End: 2018-04-03

## 2018-04-03 RX ORDER — HYDROCODONE BITARTRATE AND ACETAMINOPHEN 5; 325 MG/1; MG/1
2 TABLET ORAL AS NEEDED
Status: DISCONTINUED | OUTPATIENT
Start: 2018-04-03 | End: 2018-04-03 | Stop reason: HOSPADM

## 2018-04-03 RX ORDER — SODIUM CHLORIDE 9 MG/ML
INJECTION, SOLUTION INTRAVENOUS ONCE
Status: DISCONTINUED | OUTPATIENT
Start: 2018-04-03 | End: 2018-04-03

## 2018-04-03 RX ORDER — SODIUM CHLORIDE 9 MG/ML
INJECTION, SOLUTION INTRAVENOUS ONCE
Status: COMPLETED | OUTPATIENT
Start: 2018-04-03 | End: 2018-04-03

## 2018-04-03 RX ORDER — MONTELUKAST SODIUM 10 MG/1
10 TABLET ORAL DAILY
Status: DISCONTINUED | OUTPATIENT
Start: 2018-04-04 | End: 2018-04-08

## 2018-04-03 RX ORDER — METOPROLOL TARTRATE 5 MG/5ML
2.5 INJECTION INTRAVENOUS ONCE
Status: DISCONTINUED | OUTPATIENT
Start: 2018-04-03 | End: 2018-04-03 | Stop reason: HOSPADM

## 2018-04-03 RX ORDER — ONDANSETRON 2 MG/ML
4 INJECTION INTRAMUSCULAR; INTRAVENOUS ONCE AS NEEDED
Status: DISCONTINUED | OUTPATIENT
Start: 2018-04-03 | End: 2018-04-03 | Stop reason: HOSPADM

## 2018-04-03 RX ORDER — DEXTROSE MONOHYDRATE 25 G/50ML
50 INJECTION, SOLUTION INTRAVENOUS AS NEEDED
Status: DISCONTINUED | OUTPATIENT
Start: 2018-04-03 | End: 2018-04-04

## 2018-04-03 RX ORDER — METOLAZONE 2.5 MG/1
2.5 TABLET ORAL
Status: DISCONTINUED | OUTPATIENT
Start: 2018-04-04 | End: 2018-04-08

## 2018-04-03 RX ORDER — ONDANSETRON 2 MG/ML
4 INJECTION INTRAMUSCULAR; INTRAVENOUS EVERY 6 HOURS PRN
Status: DISCONTINUED | OUTPATIENT
Start: 2018-04-03 | End: 2018-04-08

## 2018-04-03 RX ORDER — SODIUM CHLORIDE 0.9 % (FLUSH) 0.9 %
10 SYRINGE (ML) INJECTION AS NEEDED
Status: DISCONTINUED | OUTPATIENT
Start: 2018-04-03 | End: 2018-04-08

## 2018-04-03 RX ORDER — MORPHINE SULFATE 10 MG/ML
6 INJECTION, SOLUTION INTRAMUSCULAR; INTRAVENOUS EVERY 10 MIN PRN
Status: DISCONTINUED | OUTPATIENT
Start: 2018-04-03 | End: 2018-04-03 | Stop reason: HOSPADM

## 2018-04-03 RX ORDER — SODIUM CHLORIDE 0.9 % (FLUSH) 0.9 %
10 SYRINGE (ML) INJECTION AS NEEDED
Status: DISCONTINUED | OUTPATIENT
Start: 2018-04-03 | End: 2018-04-03

## 2018-04-03 RX ORDER — BUMETANIDE 1 MG/1
2 TABLET ORAL DAILY
Status: DISCONTINUED | OUTPATIENT
Start: 2018-04-04 | End: 2018-04-08

## 2018-04-03 RX ORDER — HYDROCODONE BITARTRATE AND ACETAMINOPHEN 5; 325 MG/1; MG/1
1 TABLET ORAL AS NEEDED
Status: DISCONTINUED | OUTPATIENT
Start: 2018-04-03 | End: 2018-04-03 | Stop reason: HOSPADM

## 2018-04-03 RX ORDER — NALOXONE HYDROCHLORIDE 0.4 MG/ML
80 INJECTION, SOLUTION INTRAMUSCULAR; INTRAVENOUS; SUBCUTANEOUS AS NEEDED
Status: DISCONTINUED | OUTPATIENT
Start: 2018-04-03 | End: 2018-04-03 | Stop reason: HOSPADM

## 2018-04-03 RX ORDER — CARVEDILOL 12.5 MG/1
12.5 TABLET ORAL 2 TIMES DAILY WITH MEALS
Status: DISCONTINUED | OUTPATIENT
Start: 2018-04-04 | End: 2018-04-08

## 2018-04-03 RX ORDER — SODIUM CHLORIDE 9 MG/ML
INJECTION, SOLUTION INTRAVENOUS CONTINUOUS
Status: DISCONTINUED | OUTPATIENT
Start: 2018-04-03 | End: 2018-04-07

## 2018-04-03 RX ORDER — SODIUM CHLORIDE 0.9 % (FLUSH) 0.9 %
3 SYRINGE (ML) INJECTION AS NEEDED
Status: DISCONTINUED | OUTPATIENT
Start: 2018-04-03 | End: 2018-04-03

## 2018-04-03 RX ADMIN — SODIUM CHLORIDE: 9 INJECTION, SOLUTION INTRAVENOUS at 17:43:00

## 2018-04-03 RX ADMIN — SODIUM CHLORIDE: 9 INJECTION, SOLUTION INTRAVENOUS at 17:57:00

## 2018-04-03 RX ADMIN — LIDOCAINE HYDROCHLORIDE 25 MG: 10 INJECTION, SOLUTION EPIDURAL; INFILTRATION; INTRACAUDAL; PERINEURAL at 17:43:00

## 2018-04-03 NOTE — BRIEF OP NOTE
Pre-Operative Diagnosis: GI bleed     Post-Operative Diagnosis: Gastric ulcers, gastropathy   Procedure Performed:   Procedure(s): EGD (esophagogastroduodenscopy) with biopsies    Surgeon(s) and Role:      Segundo Perez MD - Primary    Assist

## 2018-04-03 NOTE — ED NOTES
Care assumed from triage. Patient presents with c/o rectal bleeding since Thursday. On Xarelto. Patient unable to quantify amount of blood. Reports not taking Xarelto in 2 days. Pt alert and interacting appropriately. Denies SOB or chest pain.  Friend accom

## 2018-04-03 NOTE — TELEPHONE ENCOUNTER
Patient called back and does not want to go to the ED. She would like a direct admit. I tried to explain the importance of being evaluated in the ED for her bleeding. The patient than hung up on me.    Dr. Shellie Garcia and Dr. Arielle Abebe were informed via wr

## 2018-04-03 NOTE — ED INITIAL ASSESSMENT (HPI)
Pt states since last Thursday she has been having blood streaks in her bowel movements. Pt is on Xarelto. Pt c/o weakness and fatigue.

## 2018-04-03 NOTE — CONSULTS
GI CONSULTATION:  Available medical records reviewed. Patient interviewed and examined. Please see orders and transcription. ( Dictated [id number] ). Plan, blood and fluid resuscitation, urgent EGD, high dose PPI. Thank you.

## 2018-04-03 NOTE — ANESTHESIA POSTPROCEDURE EVALUATION
Patient: Phillips Friday    Procedure Summary     Date:  04/03/18 Room / Location:  [address] [place] [address] ENDOSCOPY 01 / [address] [place] [address] ENDOSCOPY    Anesthesia Start:  1660 Anesthesia Stop:      Procedure:  ESOPHAGOGASTRODUODENOSCOPY (EGD) (N/A ) Diagnosis:       Gastrointestinal hemorr

## 2018-04-03 NOTE — H&P
History & Physical Examination    Patient Name: Lion Lanier  MRN: [id number]  [facility]: [id number]  YOB: 1945    Diagnosis: GI bleed      Prescriptions Prior to Admission:  HUMULIN 70/30 KWIKPEN (70-30) 100 UNIT/ML Subcutaneous Suspension Pen-in (XARELTO) 20 MG Oral Tab Take 20 mg by mouth daily with food. Disp:  Rfl:  4/3/2018   aspirin (ASPIRIN EC LOW DOSE) 81 MG Oral Tab EC Take 81 mg by mouth daily.  Disp:  Rfl:  4/1/2018   Diphenhydramine-APAP, sleep, (TYLENOL PM EXTRA STRENGTH)  MG/30M injection 4 mg 4 mg Intravenous Once PRN   Prochlorperazine Edisylate (COMPAZINE) injection 5 mg 5 mg Intravenous Once PRN   Naloxone HCl (NARCAN) 0.4 MG/ML injection 80 mcg 80 mcg Intravenous PRN       Allergies:   Bupropion               Unknown  Iodine agree to proceed with plan of care. I have reviewed the History and Physical done within the last 30 days. Any changes noted above.     [address] Trudy Huddleston - Gastroenterology  4/3/2018  5:42 PM

## 2018-04-03 NOTE — CONSULTS
[facility]    PATIENT'S NAME: Manjit Orozco   ATTENDING PHYSICIAN: Marilee Neely MD   CONSULTING PHYSICIAN: Andreina Eason MD   PATIENT ACCOUNT#:   [id number]    LOCATION:  [place] [address]  MEDICAL RECORD #:   [id number]       DA fraction 15% by echocardiogram 2011; atherosclerosis of coronary arteries; basal cell carcinoma, forehead; cataract; chronic renal insufficiency stage 3, with GFR of 30 to 59; diabetes mellitus; hyperlipidemia; obesity; osteoarthritis; retinal tear.       Sebastien Thomas chloride 98, bicarb 26, BUN 61, creatinine 1.05 (normal was 39 and 1.32, respectively), calcium 8.5 (uric acid has been elevated in the past), PT 16.5, INR 1.4     Imaging:  None today.       Cardiac Echo, 2015:  Mildly dilated LV with severely reduced LV f

## 2018-04-03 NOTE — TELEPHONE ENCOUNTER
I spoke with the patient directly on the phone for about 10 minutes. She is having intermittent blood per rectum and feeling weak. She stopped the Xarelto for a day or 2 but now is back on it. She is not having any chest pain.   Discussed the various opt

## 2018-04-03 NOTE — ANESTHESIA PREPROCEDURE EVALUATION
Anesthesia PreOp Note    HPI:     Mau Landin is a 67year old female who presents for preoperative consultation requested by: Nisa Fletcher MD    Date of Surgery: 4/3/2018    Procedure(s):  ESOPHAGOGASTRODUODENOSCOPY (EGD)  Indication: gi High cholesterol    • Hyperlipidemia    • MGD (meibomian gland dysfunction) 2012    OU   • No diabetic retinopathy OU 4548,9043    OU   • Obesity    • Osteoarthritis    • Pulmonary emphysema ([address]    • Retinal tear     OD       Past Surgical History:  2010 daily. Disp: 90 tablet Rfl: 1   simvastatin 40 MG Oral Tab TAKE 1 TABLET BY MOUTH EVERY EVENING Disp: 90 tablet Rfl: 1   SEAN CONTOUR TEST In Vitro Strip Check blood sugar three times daily Disp: 100 strip Rfl: 2   Insulin Pen Needle (PEN NEEDLES) 31G X 8 WBC 9.4 04/03/2018   RBC 2.41 (L) 04/03/2018   HGB 6.8 (LL) 04/03/2018   HCT 20.8 (L) 04/03/2018   MCV 86.3 04/03/2018   MCH 28.3 04/03/2018   MCHC 32.8 04/03/2018   RDW 16.2 (H) 04/03/2018    04/03/2018   MPV 8.5 04/03/2018       Lab Results  Com anesthetic management as planned.   Jose Guadalupe   4/3/2018 5:13 PM

## 2018-04-03 NOTE — H&P
[facility]    PATIENT'S NAME: Mark Wang   ATTENDING PHYSICIAN: Dwayne Glez MD   PATIENT ACCOUNT#:   [id number]    LOCATION:  [address] #:   [id number]       YOB: 1945  ADMISSION DATE:       04/03/2018 black and tarry in color. Also, she has been having stomachache and dyspepsia in the last week or two. Denies any NSAID over-the-counter use. Other 12-point review of systems is negative. No chest pain. No shortness of breath.         PHYSICAL EXAMINAT

## 2018-04-03 NOTE — TELEPHONE ENCOUNTER
4 th attempt, Pt was reached-stated she will go to ER today-sounds tired-stated she didn't sleep well,still bleeding rectally

## 2018-04-03 NOTE — ED PROVIDER NOTES
Patient Seen in: [facility] AND [facility] Emergency Department    History   No chief complaint on file.     Stated Complaint: Rectal bleeding    HPI    Patient is 70-year-old female who presents to the emergency department with a chief complaint of rectal bleedi use: No                Review of Systems    Positive for stated complaint: Rectal bleeding  Other systems are as noted in HPI. Constitutional and vital signs reviewed. All other systems reviewed and negative except as noted above.     Physical Exam within normal limits   PTT, ACTIVATED - Normal   CBC WITH DIFFERENTIAL WITH PLATELET    Narrative: The following orders were created for panel order CBC WITH DIFFERENTIAL WITH PLATELET.   Procedure                               Abnormality         Statu obtain basic health screening including reassessment of your blood pressure.     Medications Prescribed:  Current Discharge Medication List        Present on Admission  Date Reviewed: 1/22/2018          ICD-10-CM Noted POA    Upper GI bleed K92.2 4/3/2018 U

## 2018-04-04 ENCOUNTER — ANESTHESIA (OUTPATIENT)
Dept: ENDOSCOPY | Facility: HOSPITAL | Age: 73
DRG: 378 | End: 2018-04-04
Payer: MEDICARE

## 2018-04-04 ENCOUNTER — SURGERY (OUTPATIENT)
Age: 73
End: 2018-04-04

## 2018-04-04 ENCOUNTER — ANESTHESIA EVENT (OUTPATIENT)
Dept: ENDOSCOPY | Facility: HOSPITAL | Age: 73
DRG: 378 | End: 2018-04-04
Payer: MEDICARE

## 2018-04-04 PROCEDURE — 45385 COLONOSCOPY W/LESION REMOVAL: CPT | Performed by: INTERNAL MEDICINE

## 2018-04-04 PROCEDURE — 30233N1 TRANSFUSION OF NONAUTOLOGOUS RED BLOOD CELLS INTO PERIPHERAL VEIN, PERCUTANEOUS APPROACH: ICD-10-PCS | Performed by: HOSPITALIST

## 2018-04-04 PROCEDURE — 45380 COLONOSCOPY AND BIOPSY: CPT | Performed by: INTERNAL MEDICINE

## 2018-04-04 PROCEDURE — 0DBK8ZZ EXCISION OF ASCENDING COLON, VIA NATURAL OR ARTIFICIAL OPENING ENDOSCOPIC: ICD-10-PCS | Performed by: INTERNAL MEDICINE

## 2018-04-04 PROCEDURE — 43235 EGD DIAGNOSTIC BRUSH WASH: CPT | Performed by: INTERNAL MEDICINE

## 2018-04-04 PROCEDURE — 99233 SBSQ HOSP IP/OBS HIGH 50: CPT | Performed by: HOSPITALIST

## 2018-04-04 PROCEDURE — 0DJ08ZZ INSPECTION OF UPPER INTESTINAL TRACT, VIA NATURAL OR ARTIFICIAL OPENING ENDOSCOPIC: ICD-10-PCS | Performed by: INTERNAL MEDICINE

## 2018-04-04 RX ORDER — MAGNESIUM CARB/ALUMINUM HYDROX 105-160MG
296 TABLET,CHEWABLE ORAL ONCE
Status: COMPLETED | OUTPATIENT
Start: 2018-04-04 | End: 2018-04-04

## 2018-04-04 RX ORDER — SODIUM CHLORIDE 9 MG/ML
INJECTION, SOLUTION INTRAVENOUS ONCE
Status: COMPLETED | OUTPATIENT
Start: 2018-04-04 | End: 2018-04-04

## 2018-04-04 RX ORDER — SODIUM CHLORIDE, SODIUM LACTATE, POTASSIUM CHLORIDE, CALCIUM CHLORIDE 600; 310; 30; 20 MG/100ML; MG/100ML; MG/100ML; MG/100ML
INJECTION, SOLUTION INTRAVENOUS CONTINUOUS
Status: DISCONTINUED | OUTPATIENT
Start: 2018-04-04 | End: 2018-04-05

## 2018-04-04 RX ORDER — DEXTROSE MONOHYDRATE 25 G/50ML
50 INJECTION, SOLUTION INTRAVENOUS
Status: DISCONTINUED | OUTPATIENT
Start: 2018-04-04 | End: 2018-04-04 | Stop reason: HOSPADM

## 2018-04-04 RX ORDER — NALOXONE HYDROCHLORIDE 0.4 MG/ML
80 INJECTION, SOLUTION INTRAMUSCULAR; INTRAVENOUS; SUBCUTANEOUS AS NEEDED
Status: DISCONTINUED | OUTPATIENT
Start: 2018-04-04 | End: 2018-04-04 | Stop reason: HOSPADM

## 2018-04-04 RX ORDER — SODIUM CHLORIDE, SODIUM LACTATE, POTASSIUM CHLORIDE, CALCIUM CHLORIDE 600; 310; 30; 20 MG/100ML; MG/100ML; MG/100ML; MG/100ML
INJECTION, SOLUTION INTRAVENOUS CONTINUOUS PRN
Status: DISCONTINUED | OUTPATIENT
Start: 2018-04-04 | End: 2018-04-04 | Stop reason: SURG

## 2018-04-04 RX ORDER — LIDOCAINE HYDROCHLORIDE 10 MG/ML
INJECTION, SOLUTION EPIDURAL; INFILTRATION; INTRACAUDAL; PERINEURAL AS NEEDED
Status: DISCONTINUED | OUTPATIENT
Start: 2018-04-04 | End: 2018-04-04 | Stop reason: SURG

## 2018-04-04 RX ADMIN — SODIUM CHLORIDE, SODIUM LACTATE, POTASSIUM CHLORIDE, CALCIUM CHLORIDE: 600; 310; 30; 20 INJECTION, SOLUTION INTRAVENOUS at 10:28:00

## 2018-04-04 RX ADMIN — SODIUM CHLORIDE: 9 INJECTION, SOLUTION INTRAVENOUS at 11:58:00

## 2018-04-04 RX ADMIN — LIDOCAINE HYDROCHLORIDE 40 MG: 10 INJECTION, SOLUTION EPIDURAL; INFILTRATION; INTRACAUDAL; PERINEURAL at 10:30:00

## 2018-04-04 RX ADMIN — SODIUM CHLORIDE: 9 INJECTION, SOLUTION INTRAVENOUS at 10:28:00

## 2018-04-04 NOTE — H&P
History & Physical Examination    Patient Name: Lulu Cohen  MRN: [id number]  [facility]: [id number]  YOB: 1945    Diagnosis: GI bleed      Prescriptions Prior to Admission:  HUMULIN 70/30 KWIKPEN (70-30) 100 UNIT/ML Subcutaneous Suspension Pen-in 4/1/2018   Diphenhydramine-APAP, sleep, (TYLENOL PM EXTRA STRENGTH)  MG/30ML Oral Liquid Take  by mouth.  Disp:  Rfl:  Taking       Current Facility-Administered Medications:  acetaminophen (TYLENOL) tab 650 mg 650 mg Oral Q6H PRN   ondansetron HCl ( [facility])    • COPD (chronic obstructive pulmonary disease) [facility]    • Diabetes ([facility]    • Disorder of eye, unspecified 2012    RPE changes - OU   • H/O echocardiogram 2011    LVEF 15%   • High blood pressure    • High cholesterol    • Hyperlipidemia    • MGD

## 2018-04-04 NOTE — PROGRESS NOTES
[facility]    Progress Note    Reilly Round Patient Status:  Inpatient    1945 MRN [id number]   Location [facility] ENDOSCOPY LAB SUITES Attending Meryle Guarneri, MD   Hosp Day # 1 PCP MD Suresh Tejeda because of acute bleed    Dispo: transfer to PCU for close observation       Results:     Lab Results  Component Value Date   WBC 8.2 04/04/2018   HGB 7.9 (L) 04/04/2018   HCT 23.7 (L) 04/04/2018    04/04/2018   CREATSERUM 1.53 (H) 04/04/2018   BUN

## 2018-04-04 NOTE — ANESTHESIA POSTPROCEDURE EVALUATION
Patient: Maria D Casillas    Procedure Summary     Date:  04/04/18 Room / Location:  [facility] ENDOSCOPY 05 / [facility] ENDOSCOPY    Anesthesia Start:  0454 Anesthesia Stop:      Procedures:       COLONOSCOPY (N/A )      ESOPHAGOGASTRODUODENOSCOPY (EGD) (N/A ) Diagnosis:

## 2018-04-04 NOTE — BRIEF OP NOTE
Pre-Operative Diagnosis: GI BLEED     Post-Operative Diagnosis: Fresh blood throughout colon and terminal ileum, diverticulosis, pancolonic; status post polypectomy ×3, diminutive polyps; multiple subcentimeter polyps not removed; internal hemorrhoids; mod

## 2018-04-04 NOTE — PROGRESS NOTES
Insulin NPH & Regular (70-30) 100 UNIT/ML SUPN 70 Units bid with meals  is Non-Formulary Medication &  Auto-Substituted to Levemir  insulin 39 units q12h + Novolog insulin 26 units tid ac  P&T PROTOCOL and medium scale per md

## 2018-04-04 NOTE — ANESTHESIA PREPROCEDURE EVALUATION
Anesthesia PreOp Note    HPI:     Edenilson Downs is a 67year old female who presents for preoperative consultation requested by: Chanda Yanez MD    Date of Surgery: 4/3/2018 - 4/4/2018    Procedure(s):  COLONOSCOPY  Indication: NONE GIVEN LVEF 15%   • High blood pressure    • High cholesterol    • Hyperlipidemia    • MGD (meibomian gland dysfunction) 2012    OU   • No diabetic retinopathy OU [id number]    OU   • Obesity    • Osteoarthritis    • Pulmonary emphysema (HCC)    • Retinal tear Disp: 30 tablet Rfl: 0 3/20/2018   metolazone 2.5 MG Oral Tab  Disp:  Rfl: 1 Taking   bumetanide 2 MG Oral Tab Take 1 tablet (2 mg total) by mouth daily.  Disp: 90 tablet Rfl: 1 4/3/2018   MICROLET LANCETS Does not apply Misc TESTING TID Disp:  Rfl: 0 4/3/2 meals Esther Arizmendi MD 12.5 mg at 04/04/18 6067   digoxin (LANOXIN) tab 125 mcg 125 mcg Oral Daily Esther Arizmendi  mcg at 04/04/18 0808   lisinopril (PRINIVIL,ZESTRIL) tab 2.5 mg 2.5 mg Oral Daily Esther Arizmendi MD 2.5 mg at 04/04/18 7911   me  04/04/2018   K 4.1 04/04/2018    04/04/2018   CO2 27 04/04/2018   BUN 54 (H) 04/04/2018   CREATSERUM 1.53 (H) 04/04/2018    (H) 04/04/2018   PGLU 248 (H) 04/04/2018   CA 8.2 (L) 04/04/2018       Lab Results  Component Value Date   INR

## 2018-04-04 NOTE — PLAN OF CARE
GASTROINTESTINAL - ADULT    • Maintains or returns to baseline bowel function Not Progressing        HEMATOLOGIC - ADULT    • Maintains hematologic stability Not Progressing          Diabetes/Glucose Control    • Glucose maintained within prescribed range

## 2018-04-04 NOTE — OPERATIVE REPORT
[facility]    PATIENT'S NAME: Travis Villagran   ATTENDING PHYSICIAN: Genny Arana MD   OPERATING PHYSICIAN: Bev May MD   PATIENT ACCOUNT#:   [id number]    LOCATION:  [address] [place] [address] [address] RECORD #:   [id number]       DATE taken of the gastric prepyloric antrum. Then, retroflexion of the endoscope showed a normal cardia and GE junction. There was moderate gastropathy of the gastric fundus as well and biopsies x4 were taken of the gastric fundus.   The pylorus was normal.  4

## 2018-04-04 NOTE — PLAN OF CARE
Problem: Diabetes/Glucose Control  Goal: Glucose maintained within prescribed range  INTERVENTIONS:  - Monitor Blood Glucose as ordered  - Assess for signs and symptoms of hyperglycemia and hypoglycemia  - Administer ordered medications to maintain glucose METABOLIC/FLUID AND ELECTROLYTES - ADULT  Goal: Glucose maintained within prescribed range  INTERVENTIONS:  - Monitor Blood Glucose as ordered  - Assess for signs and symptoms of hyperglycemia and hypoglycemia  - Administer ordered medications to maintain participate in care and decision-making at the level they choose  - Honor patient and family perspectives and choices   Outcome: Progressing  Patient kept up to date on all information as it becomes avaliable    Problem: SKIN/TISSUE INTEGRITY - ADULT  Goal rounding implemented.

## 2018-04-04 NOTE — OPERATIVE REPORT
[facility]    PATIENT'S NAME: Juan Draper   ATTENDING PHYSICIAN: Marcia Cunningham MD   OPERATING PHYSICIAN: Curtis Adam MD   PATIENT ACCOUNT#:   [id number]    LOCATION:  [facility] ROOM 17 [facility] 10  MEDICAL RECORD # normal to the squamocolumnar junction at approximately 36 cm. No varices, ulcerations, strictures or mass lesions were seen. 3.   The stomach was entered and viewed in its entirety. There was no blood. There was mild yellowish bile in the stomach.   The

## 2018-04-04 NOTE — OR NURSING
Per Dr. Ayala Expose request, RN received verbal telephone consent for add on procedure Push Enteroscopy from pt's friend Primitivo Prado.  Verbal permission was given to both Dr. Vikash Humphrey and Thai Hoffman RN and added to Colonoscopy consent.

## 2018-04-04 NOTE — OR NURSING
RN received a verbal order for Type and Cross 2 units of PRBC's. Order set placed to prepare 2 units of Lukocyte reduced PRBC's and hold for active GI bleed. RN spoke to blood bank about order placed.

## 2018-04-04 NOTE — OPERATIVE REPORT
[facility]    PATIENT'S NAME: Kelvin Kempgeorgina   ATTENDING PHYSICIAN: Kacie Guadalupe MD   OPERATING PHYSICIAN: Sukhdev Brown MD   PATIENT ACCOUNT#:   [id number]    LOCATION:  [facility] ROOM 17 [facility] 10  MEDICAL RECORD # was then inserted and advanced without difficulty to the terminal ileum and was then slowly withdrawn. The entire colon was carefully examined within limits of preparation. Findings as follows.   There was fresh blood throughout the colon and also blood w

## 2018-04-05 ENCOUNTER — SURGERY (OUTPATIENT)
Age: 73
End: 2018-04-05

## 2018-04-05 PROCEDURE — 99232 SBSQ HOSP IP/OBS MODERATE 35: CPT | Performed by: INTERNAL MEDICINE

## 2018-04-05 PROCEDURE — 0DJ08ZZ INSPECTION OF UPPER INTESTINAL TRACT, VIA NATURAL OR ARTIFICIAL OPENING ENDOSCOPIC: ICD-10-PCS | Performed by: INTERNAL MEDICINE

## 2018-04-05 PROCEDURE — 99233 SBSQ HOSP IP/OBS HIGH 50: CPT | Performed by: HOSPITALIST

## 2018-04-05 PROCEDURE — 91110 GI TRC IMG INTRAL ESOPH-ILE: CPT | Performed by: INTERNAL MEDICINE

## 2018-04-05 RX ORDER — METHYLPREDNISOLONE SODIUM SUCCINATE 40 MG/ML
40 INJECTION, POWDER, LYOPHILIZED, FOR SOLUTION INTRAMUSCULAR; INTRAVENOUS ONCE
Status: COMPLETED | OUTPATIENT
Start: 2018-04-06 | End: 2018-04-06

## 2018-04-05 RX ORDER — METHYLPREDNISOLONE SODIUM SUCCINATE 40 MG/ML
40 INJECTION, POWDER, LYOPHILIZED, FOR SOLUTION INTRAMUSCULAR; INTRAVENOUS ONCE
Status: COMPLETED | OUTPATIENT
Start: 2018-04-05 | End: 2018-04-05

## 2018-04-05 RX ORDER — SODIUM CHLORIDE 9 MG/ML
INJECTION, SOLUTION INTRAVENOUS
Status: COMPLETED
Start: 2018-04-05 | End: 2018-04-05

## 2018-04-05 RX ORDER — 0.9 % SODIUM CHLORIDE 0.9 %
VIAL (ML) INJECTION
Status: COMPLETED
Start: 2018-04-05 | End: 2018-04-05

## 2018-04-05 RX ORDER — DIPHENHYDRAMINE HYDROCHLORIDE 50 MG/ML
50 INJECTION INTRAMUSCULAR; INTRAVENOUS ONCE
Status: COMPLETED | OUTPATIENT
Start: 2018-04-05 | End: 2018-04-06

## 2018-04-05 NOTE — CM/SW NOTE
SW attempted visit for initial assessment. Pt currently in the bathroom. SW will f/u tomorrow, as time allows. PCT to inform the pt of SW attempted visit, in case any needs are indicated.     torres nunez,RUTH ANN [id number]

## 2018-04-05 NOTE — OR NURSING
Fair tolerance swallowing Pill cam-updated nurse regarding monitoring blue blinking light, diet and medications per MD. Dr Asaf Sapp updated regarding reports of fresh bloody stool being eliminated cont  Will cont to monitor

## 2018-04-05 NOTE — PROGRESS NOTES
[facility]     Gastroenterology Progress Note    Baker Friday Patient Status:  Inpatient    1945 MRN [id number]   Location [facility] 2W/SW Attending Silverio aRiney MD   Hosp Day # 2 PCP Mark Lawson MD stridor. Cardiovascular: Positive for leg swelling. Negative for chest pain and palpitations. Gastrointestinal: Positive for blood in stool.  Negative for heartburn, nausea, vomiting, abdominal pain, diarrhea, constipation, abdominal distention, anal b 04/04/18 1600 117/53 - - 70 24 98 % - -   04/04/18 1545 116/45 98 °F (36.7 °C) Temporal 70 17 98 % 5' 1\" (1.549 m) 204 lb 8 oz (92.8 kg)   04/04/18 1445 110/56 - - 72 21 96 % - -   04/04/18 1345 109/51 - - 70 23 96 % - -   04/04/18 1245 118/53 - - 72 17 person, place, and time. Skin: Skin is warm and dry. No rash noted. She is not diaphoretic. No erythema. No pallor. Psychiatric: She has a normal mood and affect.  Her behavior is normal. Judgment and thought content normal.       Results:     Recent La

## 2018-04-05 NOTE — PLAN OF CARE
GASTROINTESTINAL - ADULT    • Maintains or returns to baseline bowel function Not Progressing        HEMATOLOGIC - ADULT    • Free from bleeding injury Not Progressing        Patient/Family Goals    • Patient/Family Long Term Goal Not Progressing    • Diane

## 2018-04-05 NOTE — PROGRESS NOTES
[facility]    Progress Note    Mau Landin Patient Status:  Inpatient    1945 MRN [id number]   Location [facility] ENDOSCOPY LAB SUITES Attending Karl Henning MD   Hosp Day # 2 PCP MD Dipti Gary On insulin, A1c 7.7  -adjust insulins (now decr oral intake) and monitor    Acute on chronic renal failure from hypovolemia and GI bleed  -Monitor    DVT proph: Contraindicated because of acute bleed    Dispo:keep in PCU for close observation   Lengthy dis

## 2018-04-06 ENCOUNTER — APPOINTMENT (OUTPATIENT)
Dept: NUCLEAR MEDICINE | Facility: HOSPITAL | Age: 73
DRG: 378 | End: 2018-04-06
Attending: INTERNAL MEDICINE
Payer: MEDICARE

## 2018-04-06 ENCOUNTER — APPOINTMENT (OUTPATIENT)
Dept: CT IMAGING | Facility: HOSPITAL | Age: 73
DRG: 378 | End: 2018-04-06
Attending: INTERNAL MEDICINE
Payer: MEDICARE

## 2018-04-06 ENCOUNTER — APPOINTMENT (OUTPATIENT)
Dept: CV DIAGNOSTICS | Facility: HOSPITAL | Age: 73
DRG: 378 | End: 2018-04-06
Attending: INTERNAL MEDICINE
Payer: MEDICARE

## 2018-04-06 PROCEDURE — 93306 TTE W/DOPPLER COMPLETE: CPT | Performed by: INTERNAL MEDICINE

## 2018-04-06 PROCEDURE — 78278 ACUTE GI BLOOD LOSS IMAGING: CPT | Performed by: INTERNAL MEDICINE

## 2018-04-06 PROCEDURE — 99233 SBSQ HOSP IP/OBS HIGH 50: CPT | Performed by: HOSPITALIST

## 2018-04-06 PROCEDURE — 99233 SBSQ HOSP IP/OBS HIGH 50: CPT | Performed by: INTERNAL MEDICINE

## 2018-04-06 PROCEDURE — 99223 1ST HOSP IP/OBS HIGH 75: CPT | Performed by: SURGERY

## 2018-04-06 PROCEDURE — 74174 CTA ABD&PLVS W/CONTRAST: CPT | Performed by: INTERNAL MEDICINE

## 2018-04-06 RX ORDER — SODIUM CHLORIDE 0.9 % (FLUSH) 0.9 %
10 SYRINGE (ML) INJECTION AS NEEDED
Status: DISCONTINUED | OUTPATIENT
Start: 2018-04-06 | End: 2018-04-08

## 2018-04-06 RX ORDER — FUROSEMIDE 10 MG/ML
20 INJECTION INTRAMUSCULAR; INTRAVENOUS ONCE
Status: COMPLETED | OUTPATIENT
Start: 2018-04-06 | End: 2018-04-06

## 2018-04-06 RX ORDER — SODIUM CHLORIDE 9 MG/ML
INJECTION, SOLUTION INTRAVENOUS ONCE
Status: DISCONTINUED | OUTPATIENT
Start: 2018-04-06 | End: 2018-04-08

## 2018-04-06 RX ORDER — SODIUM CHLORIDE 9 MG/ML
INJECTION, SOLUTION INTRAVENOUS
Status: DISPENSED
Start: 2018-04-06 | End: 2018-04-06

## 2018-04-06 RX ORDER — SODIUM CHLORIDE 9 MG/ML
INJECTION, SOLUTION INTRAVENOUS ONCE
Status: COMPLETED | OUTPATIENT
Start: 2018-04-06 | End: 2018-04-06

## 2018-04-06 NOTE — OPERATIVE REPORT
[facility]    PATIENT'S NAME: Juan Draper   ATTENDING PHYSICIAN: Marcia Cunningham MD   OPERATING PHYSICIAN: Curtis Adam MD   PATIENT ACCOUNT#:   [id number]    LOCATION:  [place] [address] #:   [id number]       D way from 21 minute, all the way to approximately 4 hours and 29 minutes, where some lymphoid hyperplasia suggesting ileum was seen; that was at 4 hours and 18 minutes and 11 seconds.       Then, abruptly at 4 hours 29 minutes and 48 seconds, first flash of

## 2018-04-06 NOTE — PLAN OF CARE
Problem: Diabetes/Glucose Control  Goal: Glucose maintained within prescribed range  INTERVENTIONS:  - Monitor Blood Glucose as ordered  - Assess for signs and symptoms of hyperglycemia and hypoglycemia  - Administer ordered medications to maintain glucose hyperglycemia and hypoglycemia  - Administer ordered medications to maintain glucose within target range  - Assess barriers to adequate nutritional intake and initiate nutrition consult as needed  - Instruct patient on self management of diabetes    Outcom assistance with activity based on assessment  - Modify environment to reduce risk of injury  - Provide assistive devices as appropriate  - Consider OT/PT consult to assist with strengthening/mobility  - Encourage toileting schedule   Outcome: Progressing

## 2018-04-06 NOTE — PROGRESS NOTES
[facility]    Progress Note    Mau Mushtaq Patient Status:  Inpatient    1945 MRN [id number]   Location [facility] ENDOSCOPY LAB SUITES Attending Karl Henning MD   Hosp Day # 3 PCP MD Dipti Gary BP low  -hold lisinopril  -cont coreg as BP allows  -cont digoxin and bumex  -MWH following  -Now off antiplatelets and Xarelto    Diabetes mellitus type 2.   On insulin, A1c 7.7  -changed to regular insulins TDD b/c NPO  -cont low dose of levemir regardles

## 2018-04-06 NOTE — BRIEF OP NOTE
Pre-Operative Diagnosis: Obscure GI bleeding for video capsule enteroscopy     Post-Operative Diagnosis: 1. Actively bleeding small bowel lesion, likely ileum; 2. Vascular malformation ×2, nonbleeding, jejunum; 3.   Mild to moderate gastropathy and superf

## 2018-04-06 NOTE — PROGRESS NOTES
[facility]     Gastroenterology Progress Note    Mable Jones Patient Status:  Inpatient    1945 MRN [id number]   Location [facility] 2W/SW Attending Phil Curiel MD   Hosp Day # 3 PCP Cyndi Mcpherson MD Wants to eat. No abdominal pain but continues to have fresh blood per rectum. HENT: Negative for congestion, ear pain, mouth sores, nosebleeds, sore throat and trouble swallowing. Eyes: Negative for pain and visual disturbance.    Respiratory: Positiv 70 27 94 % -   04/05/18 2200 96/40 - - 72 19 93 % -   04/05/18 2000 111/55 98 °F (36.7 °C) Temporal 74 18 96 % -   04/05/18 1800 113/48 - - 71 28 98 % -   04/05/18 1600 112/54 (!) 97.3 °F (36.3 °C) Temporal 69 20 97 % -   04/05/18 1400 117/57 - - 90 20 97 Labs   Lab  04/04/18   0456   04/05/18   0512  04/05/18   1203  04/05/18 2002 04/06/18   0511   RBC  2.74*   --   2.86*   --    --   2.46*   HGB  7.9*   < >  8.4*  8.3*  8.2*  7.2*   HCT  23.7*   < >  25.1*   --   25.1*  21.5*   MCV  86.5   --   87.8

## 2018-04-06 NOTE — DIETARY NOTE
ADULT NUTRITION INITIAL ASSESSMENT    Pt is at moderate nutrition risk. Pt does not meet malnutrition criteria.         RECOMMENDATIONS TO MD:  See Nutrition Intervention     NUTRITION DIAGNOSIS/PROBLEM:  Inadequate protein energy intake related to  Physio - Coordination of nutrition care: collaboration with other providers    - Discharge and transfer of nutrition care to new setting or provider: monitor plans    ADMITTING DIAGNOSIS:   Upper GI bleed [K92.2]    PERTINENT PAST MEDICAL HISTORY:   Past Medi HISTORY:  Appetite: Good per pt. Wants to eat. Intake: 0% as NPO    Intake Meeting Needs: No    Food Allergies: No  Cultural/Ethnic/Confucianist Preferences: None    MEDICATIONS: reviewed RN states received order for long acting insulin to enter.    • sodium with diet      DIETITIAN FOLLOW UP: RD to follow up within 7 days   [address], [address] [place]  ([id number])

## 2018-04-06 NOTE — CONSULTS
68 y/o on ASA and Xarelto, persistent GI bleeding, presumed SB source based on data so far. Continue supportive care, close obs, PRBC and PLT transfusion and vit K as needed, IR as indicated.

## 2018-04-06 NOTE — HISTORICAL OFFICE NOTE
Michael Kim  : 1945  ACCOUNT:  [id number]  [phone]  PCP: Dr. Rodri Rowland     TODAY'S DATE: 2018  DICTATED BY:  [Dr. Vahe Shane: [Followup of .  Heart failure, systolic, Followup of Chronic atrial fibrillation and F regular exercise. DIET: general.     ALLERGIES: Wellbutrin - Oral    MEDICATIONS: Selected prescriptions see below    VITAL SIGNS: [B/P - 110/58 , Pulse - 72, Respiration - 16, Weight -  210, Height -   61 , BMI - 39.7 ]    CONS: heavy set.  WEIGHT: Discuss Healthy eating exercise low-salt diet  3. Routine ICD follow-up  4. Follow myself annually]    PRESCRIPTIONS:   01/03/18 *Xarelto              20MG      1 TABLET DAILY.                           10/11/17 *Bumex                2MG       1 TABLET DAILY

## 2018-04-06 NOTE — CONSULTS
[facility]    Report of Consultation    Brandee Pugh Patient Status:  Inpatient    1945 MRN [id number]   Location [facility] 2W/SW Attending Yudi Spicer MD   Hosp Day # 3 PCP Rip Ricks MD     Date of Admission Arrhythmia     AICD/Pacer   • Atherosclerosis of coronary artery 2010   • Basal cell carcinoma, forehead 2012    excision   • Cataract 2004    OU   • CKD (chronic kidney disease) stage 3, GFR 30-59 ml/min    • Congestive heart disease ([facility]    • COPD ( 0.9 % injection 10 mL 10 mL Intravenous PRN   0.9%  NaCl infusion  Intravenous Once   sodium chloride 0.9 % infusion      Insulin Regular Human (NOVOLIN R) 100 UNIT/ML injection 1-7 Units 1-7 Units Subcutaneous 4 times per day   acetaminophen (TYLENOL) tab #3 300-30 MG Oral Tab Take 1 tablet by mouth every 6 (six) hours as needed for Pain.   metolazone 2.5 MG Oral Tab    bumetanide 2 MG Oral Tab Take 1 tablet (2 mg total) by mouth daily.    MICROLET LANCETS Does not apply Misc TESTING TID   Lancet Device Does Alert and oriented x 3. No apparent distress. No respiratory or constitutional distress. HEENT:  Normocephalic, anicteric sclera, neck supple, no thyromegaly or adenopathy. Neck:  No JVD, carotids 2+, no bruits. Cardiac:  Regular rate and rhythm.  S1, S2

## 2018-04-06 NOTE — H&P
History & Physical Examination    Patient Name: Sheng Flores  MRN: [id number]  [facility]: [id number]  YOB: 1945    Diagnosis: Obscure GI bleeding, for video capsule enteroscopy      Prescriptions Prior to Admission:  HUMULIN 70/30 KWIKPEN (70-30) Take 81 mg by mouth daily. Disp:  Rfl:  4/1/2018   Diphenhydramine-APAP, sleep, (TYLENOL PM EXTRA STRENGTH)  MG/30ML Oral Liquid Take  by mouth.  Disp:  Rfl:  Taking       Current Facility-Administered Medications:  insulin detemir (LEVEMIR) 100 UNIT pulmonary disease) [facility]    • Diabetes ([facility]    • Disorder of eye, unspecified 2012    RPE changes - OU   • H/O echocardiogram 2011    LVEF 15%   • High blood pressure    • High cholesterol    • Hyperlipidemia    • MGD (meibomian gland dysfunction) 2012

## 2018-04-07 PROCEDURE — 99233 SBSQ HOSP IP/OBS HIGH 50: CPT | Performed by: HOSPITALIST

## 2018-04-07 PROCEDURE — 99232 SBSQ HOSP IP/OBS MODERATE 35: CPT | Performed by: INTERNAL MEDICINE

## 2018-04-07 PROCEDURE — 99231 SBSQ HOSP IP/OBS SF/LOW 25: CPT | Performed by: SURGERY

## 2018-04-07 NOTE — CONSULTS
[facility]    PATIENT'S NAME: Herbie Joe   ATTENDING PHYSICIAN: Lennox Griffins, MD   CONSULTING PHYSICIAN: Mauricio Chaudhry MD   PATIENT ACCOUNT#:   [id number]    LOCATION:  [place] [address] #:   [id number]       [facility] be present in the ileum. Some lymphoid hyperplasia was suggested, but no mass was seen. The colon was identified at 7 hours 21 minutes. There may have been a small nonbleeding AVM in the duodenum early in the study.   CT angiography was then performed on Moist mucous membranes and nonicteric sclera. NECK:  Supple, without lymphadenopathy. LUNGS:  Clear. HEART:  Irregular. ABDOMEN:  Obese, but soft and nontender. No masses or hernias are obvious. EXTREMITIES:  Warm and dry, without cyanosis or edema.

## 2018-04-07 NOTE — PLAN OF CARE
Diabetes/Glucose Control    • Glucose maintained within prescribed range Progressing        HEMATOLOGIC - ADULT    • Maintains hematologic stability Progressing        METABOLIC/FLUID AND ELECTROLYTES - ADULT    • Glucose maintained within prescribed range

## 2018-04-07 NOTE — PROGRESS NOTES
[facility]    Progress Note    Tavon Baxter Patient Status:  Inpatient    1945 MRN [id number]   Location [facility] ENDOSCOPY LAB SUITES Attending Amy Botello MD   Hosp Day # 4 PCP Anthony Pereira MD       To anemia  s/p 4 PRBC so far  -Monitor hemoglobin, might need more transfusions  -s/p dose of vit K 4/6    Ischemic cardiomyopathy, EF~25%  Coronary artery disease, h/o CABG  PAF, h/o AICD/pacer  h/o HTN, now BP low  Mild congestion, but overall seems stable Rebecca Moore MD on 4/06/2018 at 10:50          Nm Gi Blood Loss Study  (cpt=[id number])    Result Date: 4/6/2018  CONCLUSION: Negative study. There is no evidence for active GI bleeding at this time.       Dictated by (CST): Mikki Taylor MD on 4/06/2018 at 25

## 2018-04-07 NOTE — PROGRESS NOTES
[facility]    Progress Note    Rubina Brown Patient Status:  Inpatient    1945 MRN [id number]   Location [facility] 2W/SW Attending Marcell Ornelas MD   Hosp Day # 4 PCP Robert Kyle MD     Assessment and Plan:     Flaquita Rizo 196 04/07/2018   CREATSERUM 1.50 04/07/2018   BUN 33 (H) 04/07/2018    04/07/2018   K 4.3 04/07/2018    04/07/2018   CO2 26 04/07/2018    (H) 04/07/2018   CA 8.6 04/07/2018   ALB 3.0 (L) 04/06/2018   BILT 0.7 06/27/2016   TP 7.3 02/23/20

## 2018-04-07 NOTE — PLAN OF CARE
Diabetes/Glucose Control    • Glucose maintained within prescribed range  Treated with insulin Progressing        GASTROINTESTINAL - ADULT    • Maintains or returns to baseline bowel function  Dark stool Progressing        HEMATOLOGIC - ADULT    •   Vs sta

## 2018-04-07 NOTE — PROGRESS NOTES
[facility]    Progress Note    Baker Friday Patient Status:  Inpatient    1945 MRN [id number]   Location [facility] 2W/SW Attending Silverio Rainey MD   Hosp Day # 4 PCP Mark Lawson MD         Assessment and Plan: Insulin Regular Human  1-7 Units Subcutaneous 4 times per day   • pantoprazole (PROTONIX) IV push  40 mg Intravenous Q12H   • bumetanide  2 mg Oral Daily   • carvedilol  12.5 mg Oral BID with meals   • digoxin  125 mcg Oral Daily   • metolazone  2.5 mg Ora

## 2018-04-07 NOTE — PROGRESS NOTES
[facility] AND [facility]  Progress Note    Maria D Sonja Patient Status:  Inpatient    1945 MRN [id number]   Location [facility] 2W/SW Attending Amber Ferrell MD   Hosp Day # 4 PCP Erin Fowler MD     Subjective:  Maria D Sonja is a(n) 03 study. It appeared the capsule was in the distal portion of the small bowel likely ileum. Prior workup reviewed with the patient this afternoon. Consideration for balloon enteroscopy study of the small bowel.   The patient will be agreeable to evalua

## 2018-04-08 ENCOUNTER — TELEPHONE (OUTPATIENT)
Dept: GASTROENTEROLOGY | Facility: CLINIC | Age: 73
End: 2018-04-08

## 2018-04-08 VITALS
BODY MASS INDEX: 38.08 KG/M2 | HEIGHT: 61 IN | WEIGHT: 201.69 LBS | RESPIRATION RATE: 24 BRPM | HEART RATE: 70 BPM | DIASTOLIC BLOOD PRESSURE: 57 MMHG | SYSTOLIC BLOOD PRESSURE: 114 MMHG | TEMPERATURE: 98 F | OXYGEN SATURATION: 87 %

## 2018-04-08 PROCEDURE — 99239 HOSP IP/OBS DSCHRG MGMT >30: CPT | Performed by: HOSPITALIST

## 2018-04-08 PROCEDURE — 99231 SBSQ HOSP IP/OBS SF/LOW 25: CPT | Performed by: SURGERY

## 2018-04-08 PROCEDURE — 99232 SBSQ HOSP IP/OBS MODERATE 35: CPT | Performed by: INTERNAL MEDICINE

## 2018-04-08 RX ORDER — 0.9 % SODIUM CHLORIDE 0.9 %
VIAL (ML) INJECTION
Status: DISCONTINUED
Start: 2018-04-08 | End: 2018-04-08

## 2018-04-08 NOTE — PROGRESS NOTES
[facility] AND [facility]  Progress Note    Monalisaemani Bowles Patient Status:  Inpatient    1945 MRN [id number]   Location [facility] 2W/SW Attending Jolly Joel MD   Hosp Day # 5 PCP Bethel Tamayo MD     Subjective:  Monalisa Bowles is a(n) 83 small bowel likely ileum. She will be transferred to [facility] today. I have included a copy of the capsule images on disc with her records. The pt agrees to the above plan and transfer. Emiliana Falk.  Gustavo Brown

## 2018-04-08 NOTE — PROGRESS NOTES
[facility]    Progress Note    Maria D Casillas Patient Status:  Inpatient    1945 MRN [id number]   Location [facility] 2W/SW Attending Amber Ferrell MD   Hosp Day # 5 PCP Erin Fowler MD     Assessment and Plan:     P Study  (zrb=[id number])    Result Date: 4/6/2018  CONCLUSION: Negative study. There is no evidence for active GI bleeding at this time.       Dictated by (CST): Radha Grimm MD on 4/06/2018 at 18:19     Approved by (CST): Radha Grimm MD on 4/06/2018

## 2018-04-08 NOTE — PLAN OF CARE
Problem: Diabetes/Glucose Control  Goal: Glucose maintained within prescribed range  INTERVENTIONS:  - Monitor Blood Glucose as ordered  - Assess for signs and symptoms of hyperglycemia and hypoglycemia  - Administer ordered medications to maintain glucose administration  - Ensure safe mobilization of patient  - Hold pressure on venipuncture sites to achieve adequate hemostasis  - Assess for signs and symptoms of internal bleeding  - Monitor lab trends   Outcome: Progressing      Problem: SKIN/TISSUE INTEGRI

## 2018-04-08 NOTE — PROGRESS NOTES
[facility]    Progress Note    Tiffanie Obregon Patient Status:  Inpatient    1945 MRN [id number]   Location [facility] 2W/SW Attending Hoda Melgar MD   Hosp Day # 5 PCP Fito Wolff MD         Assessment and Plan: focal deficits  Skin: no rashes or lesions              Scheduled Meds:    • insulin detemir  30 Units Subcutaneous Nightly   • Insulin Aspart Pen  5 Units Subcutaneous TID CC   • Insulin Aspart Pen  1-7 Units Subcutaneous TID CC and HS   • sodium chloride 18:19     Approved by (CST): Magdalena Cardoza MD on 4/06/2018 at 18:23                  Zion Ventura MD  4/8/2018

## 2018-04-08 NOTE — PLAN OF CARE
Diabetes/Glucose Control    • Glucose maintained within prescribed range  Insulin covers changes in glucose levels Progressing        GASTROINTESTINAL - ADULT    • Maintains or returns to baseline bowel function  Soft brown stool no bleeding noted Progress

## 2018-04-09 NOTE — DISCHARGE SUMMARY
[place] [facility]ITAL[facility]  DISCHARGE SUMMARY     Yaakov Hatch Patient Status:  Inpatient    1945 MRN [id number]   [facility] 2W/SW Attending No att. 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 Salma Stringer, Dr. Robbie Hager following--> expectant management     Acute blood loss a daily   Quantity:  100 strip  Refills:  2     bumetanide 2 MG Tabs  Commonly known as:  BUMEX      Take 1 tablet (2 mg total) by mouth daily.    Quantity:  90 tablet  Refills:  1     carvedilol 12.5 MG Tabs  Commonly known as:  COREG      TAKE 2 TABLETS BY Sopn  · Sodium Chloride 0.9 % SOLN 10 mL with Pantoprazole Sodium 40 MG SOLR 40 mg         Follow-up appointment:   No follow-up provider specified.     Vital signs:       Physical Exam:    General: No acute distress, but tearful  Respiratory: Course to [facility]

## 2018-04-09 NOTE — TELEPHONE ENCOUNTER
Entered into EPIC:Recall colon in 1 year per Dr. Adam Philip. Last Colon done 4/5/2018, next due 4/5/2019. Snapshot updated.

## 2018-04-09 NOTE — TELEPHONE ENCOUNTER
Patient had colonoscopy during admission for GI bleed. Prep was inadequate and several small tubular adenomas were removed, several polyps not removed. Please recall colonoscopy for 1 year, then close the encounter.

## 2018-04-10 ENCOUNTER — TELEPHONE (OUTPATIENT)
Dept: GASTROENTEROLOGY | Facility: CLINIC | Age: 73
End: 2018-04-10

## 2018-04-10 NOTE — TELEPHONE ENCOUNTER
Kevin Madrigal Crooked:     The method of polyp removal during colonoscopy last week was both cold snare and forceps removal.

## 2018-04-17 ENCOUNTER — LAB ENCOUNTER (OUTPATIENT)
Dept: LAB | Facility: HOSPITAL | Age: 73
End: 2018-04-17
Attending: INTERNAL MEDICINE
Payer: MEDICARE

## 2018-04-17 ENCOUNTER — OFFICE VISIT (OUTPATIENT)
Dept: INTERNAL MEDICINE CLINIC | Facility: CLINIC | Age: 73
End: 2018-04-17

## 2018-04-17 VITALS
TEMPERATURE: 98 F | HEART RATE: 70 BPM | SYSTOLIC BLOOD PRESSURE: 104 MMHG | WEIGHT: 200.31 LBS | RESPIRATION RATE: 20 BRPM | DIASTOLIC BLOOD PRESSURE: 60 MMHG | BODY MASS INDEX: 36.86 KG/M2 | HEIGHT: 62 IN

## 2018-04-17 DIAGNOSIS — A04.8 H. PYLORI INFECTION: ICD-10-CM

## 2018-04-17 DIAGNOSIS — I10 ESSENTIAL HYPERTENSION: ICD-10-CM

## 2018-04-17 DIAGNOSIS — M15.9 PRIMARY OSTEOARTHRITIS INVOLVING MULTIPLE JOINTS: ICD-10-CM

## 2018-04-17 DIAGNOSIS — Z79.4 TYPE 2 DIABETES MELLITUS WITH DIABETIC PERIPHERAL ANGIOPATHY WITHOUT GANGRENE, WITH LONG-TERM CURRENT USE OF INSULIN (HCC): ICD-10-CM

## 2018-04-17 DIAGNOSIS — D50.0 IRON DEFICIENCY ANEMIA SECONDARY TO BLOOD LOSS (CHRONIC): Primary | ICD-10-CM

## 2018-04-17 DIAGNOSIS — J44.9 CHRONIC OBSTRUCTIVE PULMONARY DISEASE, UNSPECIFIED COPD TYPE (HCC): ICD-10-CM

## 2018-04-17 DIAGNOSIS — K92.2 ACUTE GASTROINTESTINAL HEMORRHAGE: ICD-10-CM

## 2018-04-17 DIAGNOSIS — K63.5 POLYP OF COLON, UNSPECIFIED PART OF COLON, UNSPECIFIED TYPE: Primary | ICD-10-CM

## 2018-04-17 DIAGNOSIS — K92.2 UPPER GI BLEED: ICD-10-CM

## 2018-04-17 DIAGNOSIS — E11.51 TYPE 2 DIABETES MELLITUS WITH DIABETIC PERIPHERAL ANGIOPATHY WITHOUT GANGRENE, WITH LONG-TERM CURRENT USE OF INSULIN (HCC): ICD-10-CM

## 2018-04-17 PROCEDURE — G0463 HOSPITAL OUTPT CLINIC VISIT: HCPCS | Performed by: PHYSICIAN ASSISTANT

## 2018-04-17 PROCEDURE — 99214 OFFICE O/P EST MOD 30 MIN: CPT | Performed by: PHYSICIAN ASSISTANT

## 2018-04-17 PROCEDURE — 36415 COLL VENOUS BLD VENIPUNCTURE: CPT

## 2018-04-17 PROCEDURE — 1111F DSCHRG MED/CURRENT MED MERGE: CPT | Performed by: PHYSICIAN ASSISTANT

## 2018-04-17 PROCEDURE — 85025 COMPLETE CBC W/AUTO DIFF WBC: CPT

## 2018-04-17 RX ORDER — CARVEDILOL 25 MG/1
TABLET ORAL
COMMUNITY
End: 2018-04-17

## 2018-04-17 RX ORDER — CARVEDILOL 12.5 MG/1
25 TABLET ORAL
COMMUNITY
End: 2018-04-17

## 2018-04-17 RX ORDER — BUMETANIDE 2 MG/1
TABLET ORAL
COMMUNITY
End: 2018-04-17

## 2018-04-17 RX ORDER — SIMVASTATIN 10 MG
TABLET ORAL
COMMUNITY
End: 2018-04-17

## 2018-04-17 RX ORDER — LEVOFLOXACIN 250 MG/1
TABLET ORAL
Refills: 0 | COMMUNITY
Start: 2018-04-11 | End: 2018-04-17 | Stop reason: ALTCHOICE

## 2018-04-17 RX ORDER — SPIRONOLACTONE 100 MG/1
TABLET, FILM COATED ORAL
COMMUNITY
End: 2018-04-17 | Stop reason: ALTCHOICE

## 2018-04-17 RX ORDER — ASCORBIC ACID 500 MG
500 TABLET ORAL
COMMUNITY
End: 2018-04-17

## 2018-04-17 RX ORDER — MONTELUKAST SODIUM 10 MG/1
10 TABLET ORAL
COMMUNITY
End: 2018-04-17

## 2018-04-17 RX ORDER — METOLAZONE 2.5 MG/1
2.5 TABLET ORAL
COMMUNITY
End: 2018-04-17

## 2018-04-17 RX ORDER — PANTOPRAZOLE SODIUM 40 MG/1
40 TABLET, DELAYED RELEASE ORAL
COMMUNITY
Start: 2018-04-11 | End: 2018-04-17

## 2018-04-17 RX ORDER — CLARITHROMYCIN 500 MG/1
500 TABLET, COATED ORAL
COMMUNITY
Start: 2018-04-11 | End: 2018-04-22

## 2018-04-17 RX ORDER — LISINOPRIL 20 MG/1
TABLET ORAL
COMMUNITY
End: 2018-04-17 | Stop reason: ALTCHOICE

## 2018-04-17 RX ORDER — RIBOFLAVIN (VITAMIN B2) 100 MG
100 TABLET ORAL DAILY
Status: ON HOLD | COMMUNITY
End: 2019-01-01

## 2018-04-17 RX ORDER — AMOXICILLIN 500 MG/1
1000 CAPSULE ORAL
COMMUNITY
Start: 2018-04-11 | End: 2018-04-22

## 2018-04-17 RX ORDER — DIGOXIN 0.05 MG/ML
SOLUTION ORAL
COMMUNITY
End: 2018-04-17

## 2018-04-17 RX ORDER — DIGOXIN 125 MCG
0.12 TABLET ORAL
COMMUNITY
End: 2018-04-17

## 2018-04-17 RX ORDER — SIMVASTATIN 40 MG
40 TABLET ORAL
COMMUNITY
End: 2018-04-17

## 2018-04-17 RX ORDER — PANTOPRAZOLE SODIUM 40 MG/1
TABLET, DELAYED RELEASE ORAL
Refills: 0 | COMMUNITY
Start: 2018-04-11 | End: 2018-05-21

## 2018-04-17 NOTE — PATIENT INSTRUCTIONS
Please follow up with the following providers:  Dr. Mariel Whittington (Endocrinology) for diabetes   Dr. Melanie Duncan (Gastroenterology) for GI bleeding and H. Pylori  Dr. Svitlana Richardson (Cardiology) for A. Fib, and coronary artery disease     Call [phone] to schedule

## 2018-04-17 NOTE — PROGRESS NOTES
HPI:    Hugh Tejada is a 67year old female here today for hospital follow up.    She was discharged from Inpatient hospital[facility]  to [facility] Date: 4/3/2018  Discharge Date: 4/8/2018  Hospital Discharge Diagnosis:    Melena, acute up jejunum, negative for bleeding lesions. Video capsule endoscopy showed abrupt onset bleeding in ileum. CT angiogram did not identify source of bleeding but noted SMA and renal artery stenosis.   Tagged RBC scan showed no evidence of active GI bleeding o APPLY EXTERNALLY TO THE AFFECTED AREA TWICE DAILY AS NEEDED   digoxin (DIGOX) 0.125 MG Oral Tab Take 1 tablet (125 mcg total) by mouth once daily.    simvastatin 40 MG Oral Tab TAKE 1 TABLET BY MOUTH EVERY EVENING   SEAN CONTOUR TEST In Vitro Strip Check b and mother. She  reports that she quit smoking about 15 years ago. She has quit using smokeless tobacco. She reports that she does not drink alcohol or use drugs. ROS:   Review of Systems   Constitutional: Positive for fatigue.  Negative for chills an are equal, round, and reactive to light. Neck: Normal range of motion. Neck supple. Cardiovascular: Normal rate, regular rhythm, normal heart sounds and intact distal pulses. Edema not present.   Pulmonary/Chest: Effort normal and breath sounds norm endocrinology. Try to maintain a healthy diet and starches, sweets, soda/juice. Chronic obstructive pulmonary disease, unspecified COPD type [facility]  Stable. Continue current medications.   -     DME - EXTERNAL     Primary osteoarthritis involving multip

## 2018-04-19 ENCOUNTER — TELEPHONE (OUTPATIENT)
Dept: OTHER | Age: 73
End: 2018-04-19

## 2018-04-19 DIAGNOSIS — D50.0 IRON DEFICIENCY ANEMIA DUE TO CHRONIC BLOOD LOSS: Primary | ICD-10-CM

## 2018-04-19 NOTE — TELEPHONE ENCOUNTER
Patient was calling to get results of CBC from 4/17/18. Advised patient that hemoglobin was 7.8. Patient stated that has been very fatigued. Not sleeping well during the night because taking naps during the day. Patient denies any signs of bleeding.  Stool

## 2018-04-19 NOTE — TELEPHONE ENCOUNTER
Called over to patient and she indicated that just got off the phone with Elissa Zhong. Patient agrees that if any signs of bleeding will go to the ER.

## 2018-04-19 NOTE — TELEPHONE ENCOUNTER
Spoke to patient regarding results. Advised to have CBC redrawn tomorrow. Monitor for any signs of bleeding and either contact office of go to ER if recurs. Will also reach out to GI for any further recommendations.  She is not currently on iron supplementa

## 2018-04-20 ENCOUNTER — LAB ENCOUNTER (OUTPATIENT)
Dept: LAB | Age: 73
End: 2018-04-20
Attending: PHYSICIAN ASSISTANT
Payer: MEDICARE

## 2018-04-20 DIAGNOSIS — K92.2 UPPER GI BLEED: ICD-10-CM

## 2018-04-20 PROCEDURE — 36415 COLL VENOUS BLD VENIPUNCTURE: CPT

## 2018-04-20 PROCEDURE — 85025 COMPLETE CBC W/AUTO DIFF WBC: CPT

## 2018-04-21 RX ORDER — FERROUS SULFATE 325(65) MG
325 TABLET ORAL 2 TIMES DAILY
Qty: 60 TABLET | Refills: 5 | Status: SHIPPED | OUTPATIENT
Start: 2018-04-21 | End: 2018-01-01

## 2018-04-21 NOTE — TELEPHONE ENCOUNTER
Spoke to Dr. Eusebio Cho regarding patient hemoglobin results. Has been downtrending since discharge 8.2->7.8->7.4 most recently. We have reached out to GI regarding further recommendations.   At this point we will initiate treatment with iron supplementation

## 2018-04-21 NOTE — TELEPHONE ENCOUNTER
Spoke to Dr. Mason Lee's APN, Jules Aldridge. Discussed that we have stopped Xarelto at this time given patient's downtrending hemoglobin. Agrees that it is okay to hold for now.  She recommends patient follow up in their clinic this week to discuss manage

## 2018-04-21 NOTE — TELEPHONE ENCOUNTER
Pt called in for results of CBC that was done yesterday. Pt was given the hgb result as she requested. Pt continues to deny blood in the stool. States that stool is \"dark\" but not black. States that she is fatigued but denies sob.     Any recommendations

## 2018-04-23 ENCOUNTER — PRIOR ORIGINAL RECORDS (OUTPATIENT)
Dept: OTHER | Age: 73
End: 2018-04-23

## 2018-04-23 ENCOUNTER — APPOINTMENT (OUTPATIENT)
Dept: LAB | Age: 73
End: 2018-04-23
Attending: PHYSICIAN ASSISTANT
Payer: MEDICARE

## 2018-04-23 DIAGNOSIS — D50.0 IRON DEFICIENCY ANEMIA DUE TO CHRONIC BLOOD LOSS: ICD-10-CM

## 2018-04-23 PROCEDURE — 85027 COMPLETE CBC AUTOMATED: CPT

## 2018-04-23 PROCEDURE — 36415 COLL VENOUS BLD VENIPUNCTURE: CPT

## 2018-04-24 ENCOUNTER — TELEPHONE (OUTPATIENT)
Dept: OTHER | Age: 73
End: 2018-04-24

## 2018-04-24 ENCOUNTER — PRIOR ORIGINAL RECORDS (OUTPATIENT)
Dept: OTHER | Age: 73
End: 2018-04-24

## 2018-04-24 DIAGNOSIS — D50.0 IRON DEFICIENCY ANEMIA DUE TO CHRONIC BLOOD LOSS: Primary | ICD-10-CM

## 2018-04-24 NOTE — TELEPHONE ENCOUNTER
CBC shows HGB is up from 7.4 to 7.6 which is a good sign. Continue iron supplementation. We should repeat the CBC in one week from today. Please also assist the patient in scheduling f/u appointment with me or Dr. George Willett in one week.  She also needs to david

## 2018-04-24 NOTE — TELEPHONE ENCOUNTER
Rayne Halsted, PA:  Please review CBC done 4/23/18. Please advise on further direction. Patient has appt with Dr. Laury Lee today at 4:30pm please note.

## 2018-04-25 ENCOUNTER — TELEPHONE (OUTPATIENT)
Dept: INTERVENTIONAL RADIOLOGY/VASCULAR | Facility: HOSPITAL | Age: 73
End: 2018-04-25

## 2018-04-25 NOTE — TELEPHONE ENCOUNTER
Advised patient of Key Santos's note. Patient verbalized understanding. Patient indicated that she saw Dr De and he wants to do a procedure to put in a device to prevent blood clots and keep patient off of the xarelto.  Was too late to schedule last n

## 2018-04-27 ENCOUNTER — HOSPITAL ENCOUNTER (OUTPATIENT)
Dept: INTERVENTIONAL RADIOLOGY/VASCULAR | Facility: HOSPITAL | Age: 73
Discharge: HOME | End: 2018-04-27
Attending: INTERNAL MEDICINE | Admitting: INTERNAL MEDICINE
Payer: MEDICARE

## 2018-04-27 ENCOUNTER — HOSPITAL ENCOUNTER (OUTPATIENT)
Dept: CV DIAGNOSTICS | Facility: HOSPITAL | Age: 73
Discharge: HOME | End: 2018-04-27
Attending: INTERNAL MEDICINE
Payer: MEDICARE

## 2018-04-27 VITALS
DIASTOLIC BLOOD PRESSURE: 57 MMHG | RESPIRATION RATE: 22 BRPM | WEIGHT: 198 LBS | SYSTOLIC BLOOD PRESSURE: 133 MMHG | BODY MASS INDEX: 36.44 KG/M2 | OXYGEN SATURATION: 99 % | HEART RATE: 70 BPM | HEIGHT: 62 IN

## 2018-04-27 DIAGNOSIS — I48.91 A-FIB (HCC): ICD-10-CM

## 2018-04-27 PROCEDURE — B24BZZ4 ULTRASONOGRAPHY OF HEART WITH AORTA, TRANSESOPHAGEAL: ICD-10-PCS | Performed by: INTERNAL MEDICINE

## 2018-04-27 PROCEDURE — 93320 DOPPLER ECHO COMPLETE: CPT | Performed by: INTERNAL MEDICINE

## 2018-04-27 PROCEDURE — 93325 DOPPLER ECHO COLOR FLOW MAPG: CPT | Performed by: INTERNAL MEDICINE

## 2018-04-27 PROCEDURE — 93312 ECHO TRANSESOPHAGEAL: CPT

## 2018-04-27 PROCEDURE — 82962 GLUCOSE BLOOD TEST: CPT

## 2018-04-27 PROCEDURE — 99152 MOD SED SAME PHYS/QHP 5/>YRS: CPT

## 2018-04-27 RX ORDER — MIDAZOLAM HYDROCHLORIDE 1 MG/ML
INJECTION INTRAMUSCULAR; INTRAVENOUS
Status: COMPLETED
Start: 2018-04-27 | End: 2018-04-27

## 2018-04-27 RX ORDER — INSULIN ASPART 100 [IU]/ML
INJECTION, SOLUTION INTRAVENOUS; SUBCUTANEOUS ONCE
Status: CANCELLED | OUTPATIENT
Start: 2018-04-27 | End: 2018-04-27

## 2018-04-27 RX ORDER — DEXTROSE MONOHYDRATE 50 MG/ML
INJECTION, SOLUTION INTRAVENOUS CONTINUOUS
Status: CANCELLED | OUTPATIENT
Start: 2018-04-27

## 2018-04-27 RX ORDER — SODIUM CHLORIDE 9 MG/ML
INJECTION, SOLUTION INTRAVENOUS CONTINUOUS
Status: DISCONTINUED | OUTPATIENT
Start: 2018-04-27 | End: 2018-04-27

## 2018-04-27 RX ORDER — MIDAZOLAM HYDROCHLORIDE 1 MG/ML
2 INJECTION INTRAMUSCULAR; INTRAVENOUS ONCE
Status: DISCONTINUED | OUTPATIENT
Start: 2018-04-27 | End: 2018-04-27

## 2018-04-27 RX ORDER — MIDAZOLAM HYDROCHLORIDE 1 MG/ML
INJECTION INTRAMUSCULAR; INTRAVENOUS
Status: DISCONTINUED
Start: 2018-04-27 | End: 2018-04-27

## 2018-04-27 RX ORDER — DEXTROSE MONOHYDRATE 25 G/50ML
50 INJECTION, SOLUTION INTRAVENOUS
Status: CANCELLED | OUTPATIENT
Start: 2018-04-27

## 2018-04-27 RX ORDER — SODIUM CHLORIDE 9 MG/ML
INJECTION, SOLUTION INTRAVENOUS
Status: DISCONTINUED
Start: 2018-04-27 | End: 2018-04-27

## 2018-04-27 RX ADMIN — MIDAZOLAM HYDROCHLORIDE 2 MG: 1 INJECTION INTRAMUSCULAR; INTRAVENOUS at 13:45:00

## 2018-04-27 NOTE — PROCEDURES
Cardiology Transesophageal Echo Note    PRE-PROCEDURE DIAGNOSIS: pre watchman    PROCEDURE: Transesophageal Echocardiogram.    SEDATION:   Topical spray x 1  Versed: 2 mg  Fentanyl: 50 mcg  I personally supervised the intravenous administration of consciou

## 2018-04-27 NOTE — PRE-SEDATION ASSESSMENT
Pre-Procedure Sedation Assessment    Chief Complaint/Indication for procedure: pre WATCHMAN    History of snoring or sleep or apnea?    No    History of previous problems with anesthesia or sedation  No    Physical Findings:  Neck: nl ROM  CV: RRR, 2/6 HSM

## 2018-04-30 ENCOUNTER — OFFICE VISIT (OUTPATIENT)
Dept: INTERNAL MEDICINE CLINIC | Facility: CLINIC | Age: 73
End: 2018-04-30

## 2018-04-30 ENCOUNTER — APPOINTMENT (OUTPATIENT)
Dept: LAB | Facility: HOSPITAL | Age: 73
End: 2018-04-30
Attending: PHYSICIAN ASSISTANT
Payer: MEDICARE

## 2018-04-30 VITALS
DIASTOLIC BLOOD PRESSURE: 66 MMHG | BODY MASS INDEX: 37.54 KG/M2 | HEART RATE: 70 BPM | TEMPERATURE: 98 F | WEIGHT: 204 LBS | RESPIRATION RATE: 18 BRPM | SYSTOLIC BLOOD PRESSURE: 112 MMHG | HEIGHT: 62 IN

## 2018-04-30 DIAGNOSIS — I48.91 ATRIAL FIBRILLATION, UNSPECIFIED TYPE (HCC): ICD-10-CM

## 2018-04-30 DIAGNOSIS — D50.0 IRON DEFICIENCY ANEMIA DUE TO CHRONIC BLOOD LOSS: ICD-10-CM

## 2018-04-30 DIAGNOSIS — K92.2 UPPER GI BLEED: Primary | ICD-10-CM

## 2018-04-30 PROCEDURE — 99213 OFFICE O/P EST LOW 20 MIN: CPT | Performed by: PHYSICIAN ASSISTANT

## 2018-04-30 PROCEDURE — 36415 COLL VENOUS BLD VENIPUNCTURE: CPT

## 2018-04-30 PROCEDURE — 85027 COMPLETE CBC AUTOMATED: CPT

## 2018-04-30 RX ORDER — POLYETHYLENE GLYCOL 3350 17 G/17G
17 POWDER, FOR SOLUTION ORAL AS NEEDED
COMMUNITY
End: 2019-01-01

## 2018-04-30 NOTE — PROGRESS NOTES
HPI:    Patient ID: Lulu Cohen is a 67year old female. HPI   Patient presents for follow up. Was last seen in the office about 2 weeks ago after recent hospital admission for melena and upper GI bleed.  Since that time hemoglobin started trending tory Oral Powder Take 17 g by mouth as needed. Disp:  Rfl:    Ferrous Sulfate 325 (65 Fe) MG Oral Tab Take 1 tablet (325 mg total) by mouth 2 (two) times daily. Disp: 60 tablet Rfl: 5   aspirin 81 MG Oral Chew Tab Chew 81 mg by mouth.  Disp:  Rfl:    Ascorbic Ac surgical prep  Bupropion               Unknown  Iodine  [Gnp Iodine]    Unknown   PHYSICAL EXAM:   Physical Exam   Nursing note and vitals reviewed. Constitutional: She is oriented to person, place, and time.  She appears well-developed and well-nourished

## 2018-05-01 ENCOUNTER — TELEPHONE (OUTPATIENT)
Dept: INTERNAL MEDICINE CLINIC | Facility: CLINIC | Age: 73
End: 2018-05-01

## 2018-05-01 DIAGNOSIS — D50.0 IRON DEFICIENCY ANEMIA DUE TO CHRONIC BLOOD LOSS: Primary | ICD-10-CM

## 2018-05-01 NOTE — TELEPHONE ENCOUNTER
Pt would like to know if Dr. Marsha Boyd got any information from Dr. Edmond Fuentes about the procedure.

## 2018-05-02 NOTE — TELEPHONE ENCOUNTER
Left message for patient. Spoke to Dr. Lewis Webber directly. He advised to restart Xarelto at 15 mg once daily. Check CBC once weekly. Orders have been generated. Next lab draw should be on 5/7/18. Also discussed that she may return to work as tolerated.   Let

## 2018-05-04 ENCOUNTER — TELEPHONE (OUTPATIENT)
Dept: INTERNAL MEDICINE CLINIC | Facility: CLINIC | Age: 73
End: 2018-05-04

## 2018-05-04 NOTE — TELEPHONE ENCOUNTER
Pt stts her heart doc will be giving her a procedure   Pt is not sure when this procedure will take place  Pt is asking if [place] know more about this procedure   Pt stts she need to have something in writing to provide for her work   Please advise

## 2018-05-07 ENCOUNTER — TELEPHONE (OUTPATIENT)
Dept: INTERVENTIONAL RADIOLOGY/VASCULAR | Facility: HOSPITAL | Age: 73
End: 2018-05-07

## 2018-05-07 NOTE — TELEPHONE ENCOUNTER
Please see 5/1/18 telephone encounter. No definitive date set for watchman procedure as of right now. Dr. Elvia Anthony said it could take up to 3 or 4 weeks to get this scheduled in the meantime she may return to work as tolerated.

## 2018-05-08 ENCOUNTER — TELEPHONE (OUTPATIENT)
Dept: INTERVENTIONAL RADIOLOGY/VASCULAR | Facility: HOSPITAL | Age: 73
End: 2018-05-08

## 2018-05-08 ENCOUNTER — TELEPHONE (OUTPATIENT)
Dept: OTHER | Age: 73
End: 2018-05-08

## 2018-05-08 ENCOUNTER — APPOINTMENT (OUTPATIENT)
Dept: LAB | Age: 73
End: 2018-05-08
Attending: PHYSICIAN ASSISTANT
Payer: MEDICARE

## 2018-05-08 DIAGNOSIS — D50.0 IRON DEFICIENCY ANEMIA DUE TO CHRONIC BLOOD LOSS: ICD-10-CM

## 2018-05-08 PROCEDURE — 36415 COLL VENOUS BLD VENIPUNCTURE: CPT

## 2018-05-08 PROCEDURE — 85027 COMPLETE CBC AUTOMATED: CPT

## 2018-05-08 NOTE — TELEPHONE ENCOUNTER
Spoke to patient regarding procedure. She was able to get in contact with the cardiologist's office and has no further questions.

## 2018-05-08 NOTE — TELEPHONE ENCOUNTER
Spoke to patient over the phone regarding symptoms. States she started having bilateral lumbar back pain 2 days ago. It is difficult to stand straight up and she is having to lean forward or lay down for relief.   Pain feels like a pinching or \"pins and

## 2018-05-08 NOTE — TELEPHONE ENCOUNTER
Received call from pt. She was returning a call about a different issue. Pt requested that LR call her regarding new back pain symptoms. States that she isn't sure if she pulled a muscle.  She is having pain to her lower back, states that it feels like \"

## 2018-05-21 RX ORDER — PANTOPRAZOLE SODIUM 40 MG/1
40 TABLET, DELAYED RELEASE ORAL
Status: ON HOLD | COMMUNITY
End: 2018-05-25 | Stop reason: ALTCHOICE

## 2018-05-21 RX ORDER — MONTELUKAST SODIUM 10 MG/1
10 TABLET ORAL NIGHTLY
COMMUNITY
End: 2018-06-29

## 2018-05-21 RX ORDER — CARVEDILOL 12.5 MG/1
25 TABLET ORAL 2 TIMES DAILY WITH MEALS
COMMUNITY
End: 2018-06-29

## 2018-05-21 NOTE — HISTORICAL OFFICE NOTE
Gladys Chaudhary  : 1945  ACCOUNT:  [id number]  [phone]  PCP: Dr. Trinidad Resendiz     TODAY'S DATE: 2018  DICTATED BY:  [Dr. Yasmany Schaeffer: [Followup of Chronic atrial fibrillation.]    HPI:    [On 2018, Brittanie Gates, ALCOHOL: denies drinking. EXERCISE: no regular exercise.  DIET: general.     ALLERGIES: Wellbutrin - Oral    MEDICATIONS: Selected prescriptions see below    VITAL SIGNS: [B/P - 112/58 , Pulse - 70, Weight -  200, Height -   61 , BMI - 37.8 ]    CONS: heavy vessels  2. . Heart failure, systolic  3. Cardiomyopathy, unspecified  4. Chronic atrial fibrillation  5. COPD  6. DM, Type II/Unspecified, controlled  7. History of CABGx2 3/2010  8. Hypercholesteremia, pure  9. Hypertension, benign  10.  ICD reprogramming

## 2018-05-21 NOTE — HISTORICAL OFFICE NOTE
Kannana Numbers  : 1945  18 11:04:01  ACCOUNT:  [id number]  HOME PHONE:  [phone]  WORK PHONE:  [phone]    Pt called today as follow up from call on 2018 when pt was told by Dr. Satish Ridley to hold Xarelto for GI bleed.  see APN note bel

## 2018-05-22 ENCOUNTER — HOSPITAL ENCOUNTER (OUTPATIENT)
Dept: GENERAL RADIOLOGY | Facility: HOSPITAL | Age: 73
Discharge: HOME | DRG: 274 | End: 2018-05-22
Attending: INTERNAL MEDICINE
Payer: MEDICARE

## 2018-05-22 ENCOUNTER — APPOINTMENT (OUTPATIENT)
Dept: LAB | Facility: HOSPITAL | Age: 73
DRG: 274 | End: 2018-05-22
Attending: INTERNAL MEDICINE
Payer: MEDICARE

## 2018-05-22 ENCOUNTER — PRIOR ORIGINAL RECORDS (OUTPATIENT)
Dept: OTHER | Age: 73
End: 2018-05-22

## 2018-05-22 DIAGNOSIS — I48.91 ATRIAL FIBRILLATION, UNSPECIFIED TYPE (HCC): ICD-10-CM

## 2018-05-22 DIAGNOSIS — I50.9 CONGESTIVE HEART FAILURE, UNSPECIFIED HF CHRONICITY, UNSPECIFIED HEART FAILURE TYPE (HCC): ICD-10-CM

## 2018-05-22 DIAGNOSIS — Z01.810 PRE-OPERATIVE CARDIOVASCULAR EXAMINATION: ICD-10-CM

## 2018-05-22 DIAGNOSIS — I48.91 ATRIAL FIBRILLATION (HCC): ICD-10-CM

## 2018-05-22 PROCEDURE — 86978 RBC PRETREATMENT SERUM: CPT

## 2018-05-22 PROCEDURE — 85610 PROTHROMBIN TIME: CPT

## 2018-05-22 PROCEDURE — 85025 COMPLETE CBC W/AUTO DIFF WBC: CPT

## 2018-05-22 PROCEDURE — 71046 X-RAY EXAM CHEST 2 VIEWS: CPT | Performed by: INTERNAL MEDICINE

## 2018-05-22 PROCEDURE — 93005 ELECTROCARDIOGRAM TRACING: CPT

## 2018-05-22 PROCEDURE — 86901 BLOOD TYPING SEROLOGIC RH(D): CPT

## 2018-05-22 PROCEDURE — 80053 COMPREHEN METABOLIC PANEL: CPT

## 2018-05-22 PROCEDURE — 86900 BLOOD TYPING SEROLOGIC ABO: CPT

## 2018-05-22 PROCEDURE — 36415 COLL VENOUS BLD VENIPUNCTURE: CPT

## 2018-05-22 PROCEDURE — 86870 RBC ANTIBODY IDENTIFICATION: CPT

## 2018-05-22 PROCEDURE — 93010 ELECTROCARDIOGRAM REPORT: CPT | Performed by: INTERNAL MEDICINE

## 2018-05-22 PROCEDURE — 86850 RBC ANTIBODY SCREEN: CPT

## 2018-05-23 LAB
ALBUMIN: 3.8 G/DL
ALKALINE PHOSPHATATE(ALK PHOS): 61 IU/L
BILIRUBIN TOTAL: 0.8 MG/DL
BUN: 44 MG/DL
CALCIUM: 9.1 MG/DL
CHLORIDE: 96 MEQ/L
CREATININE, SERUM: 1.64 MG/DL
GLUCOSE: 313 MG/DL
HEMATOCRIT: 34.6 %
HEMOGLOBIN: 10.4 G/DL
PLATELETS: 219 K/UL
POTASSIUM, SERUM: 4.5 MEQ/L
PROTEIN, TOTAL: 7.8 G/DL
RED BLOOD COUNT: 3.51 X 10-6/U
SGOT (AST): 21 IU/L
SGPT (ALT): 15 IU/L
SODIUM: 138 MEQ/L
WHITE BLOOD COUNT: 6.6 X 10-3/U

## 2018-05-24 ENCOUNTER — ANESTHESIA EVENT (OUTPATIENT)
Dept: CARDIAC SURGERY | Facility: HOSPITAL | Age: 73
DRG: 274 | End: 2018-05-24
Payer: MEDICARE

## 2018-05-25 ENCOUNTER — APPOINTMENT (OUTPATIENT)
Dept: CV DIAGNOSTICS | Facility: HOSPITAL | Age: 73
DRG: 274 | End: 2018-05-25
Attending: INTERNAL MEDICINE
Payer: MEDICARE

## 2018-05-25 ENCOUNTER — SURGERY (OUTPATIENT)
Age: 73
End: 2018-05-25

## 2018-05-25 ENCOUNTER — HOSPITAL ENCOUNTER (INPATIENT)
Facility: HOSPITAL | Age: 73
LOS: 1 days | Discharge: HOME | DRG: 274 | End: 2018-05-26
Attending: INTERNAL MEDICINE | Admitting: INTERNAL MEDICINE
Payer: MEDICARE

## 2018-05-25 ENCOUNTER — PRIOR ORIGINAL RECORDS (OUTPATIENT)
Dept: OTHER | Age: 73
End: 2018-05-25

## 2018-05-25 ENCOUNTER — ANESTHESIA (OUTPATIENT)
Dept: CARDIAC SURGERY | Facility: HOSPITAL | Age: 73
DRG: 274 | End: 2018-05-25
Payer: MEDICARE

## 2018-05-25 PROCEDURE — 82803 BLOOD GASES ANY COMBINATION: CPT

## 2018-05-25 PROCEDURE — 82962 GLUCOSE BLOOD TEST: CPT

## 2018-05-25 PROCEDURE — 93320 DOPPLER ECHO COMPLETE: CPT | Performed by: INTERNAL MEDICINE

## 2018-05-25 PROCEDURE — 86901 BLOOD TYPING SEROLOGIC RH(D): CPT | Performed by: INTERNAL MEDICINE

## 2018-05-25 PROCEDURE — 84132 ASSAY OF SERUM POTASSIUM: CPT

## 2018-05-25 PROCEDURE — 02L73DK OCCLUSION OF LEFT ATRIAL APPENDAGE WITH INTRALUMINAL DEVICE, PERCUTANEOUS APPROACH: ICD-10-PCS | Performed by: INTERNAL MEDICINE

## 2018-05-25 PROCEDURE — 82330 ASSAY OF CALCIUM: CPT

## 2018-05-25 PROCEDURE — 86870 RBC ANTIBODY IDENTIFICATION: CPT | Performed by: INTERNAL MEDICINE

## 2018-05-25 PROCEDURE — 86077 PHYS BLOOD BANK SERV XMATCH: CPT | Performed by: INTERNAL MEDICINE

## 2018-05-25 PROCEDURE — 86922 COMPATIBILITY TEST ANTIGLOB: CPT

## 2018-05-25 PROCEDURE — 85014 HEMATOCRIT: CPT

## 2018-05-25 PROCEDURE — 85610 PROTHROMBIN TIME: CPT | Performed by: INTERNAL MEDICINE

## 2018-05-25 PROCEDURE — 80053 COMPREHEN METABOLIC PANEL: CPT | Performed by: INTERNAL MEDICINE

## 2018-05-25 PROCEDURE — 85025 COMPLETE CBC W/AUTO DIFF WBC: CPT | Performed by: INTERNAL MEDICINE

## 2018-05-25 PROCEDURE — 93005 ELECTROCARDIOGRAM TRACING: CPT

## 2018-05-25 PROCEDURE — B51VYZZ FLUOROSCOPY OF OTHER VEINS USING OTHER CONTRAST: ICD-10-PCS | Performed by: INTERNAL MEDICINE

## 2018-05-25 PROCEDURE — 87081 CULTURE SCREEN ONLY: CPT | Performed by: INTERNAL MEDICINE

## 2018-05-25 PROCEDURE — 86850 RBC ANTIBODY SCREEN: CPT | Performed by: INTERNAL MEDICINE

## 2018-05-25 PROCEDURE — 85347 COAGULATION TIME ACTIVATED: CPT

## 2018-05-25 PROCEDURE — 86900 BLOOD TYPING SEROLOGIC ABO: CPT | Performed by: INTERNAL MEDICINE

## 2018-05-25 PROCEDURE — 84295 ASSAY OF SERUM SODIUM: CPT

## 2018-05-25 PROCEDURE — 93312 ECHO TRANSESOPHAGEAL: CPT

## 2018-05-25 PROCEDURE — B245ZZ4 ULTRASONOGRAPHY OF LEFT HEART, TRANSESOPHAGEAL: ICD-10-PCS | Performed by: INTERNAL MEDICINE

## 2018-05-25 PROCEDURE — 93325 DOPPLER ECHO COLOR FLOW MAPG: CPT | Performed by: INTERNAL MEDICINE

## 2018-05-25 PROCEDURE — 84132 ASSAY OF SERUM POTASSIUM: CPT | Performed by: INTERNAL MEDICINE

## 2018-05-25 PROCEDURE — 93010 ELECTROCARDIOGRAM REPORT: CPT | Performed by: INTERNAL MEDICINE

## 2018-05-25 DEVICE — LEFT ATRIAL APPENDAGE CLOSURE DEVICE WITH DELIVERY SYSTEM
Type: IMPLANTABLE DEVICE | Status: FUNCTIONAL
Brand: WATCHMAN®

## 2018-05-25 RX ORDER — ACETAMINOPHEN 325 MG/1
650 TABLET ORAL EVERY 4 HOURS PRN
Status: DISCONTINUED | OUTPATIENT
Start: 2018-05-25 | End: 2018-05-26

## 2018-05-25 RX ORDER — ACETAMINOPHEN AND CODEINE PHOSPHATE 300; 30 MG/1; MG/1
1 TABLET ORAL EVERY 4 HOURS PRN
Status: DISCONTINUED | OUTPATIENT
Start: 2018-05-25 | End: 2018-05-26

## 2018-05-25 RX ORDER — LIDOCAINE HYDROCHLORIDE 10 MG/ML
INJECTION, SOLUTION EPIDURAL; INFILTRATION; INTRACAUDAL; PERINEURAL
Status: DISCONTINUED
Start: 2018-05-25 | End: 2018-05-25 | Stop reason: WASHOUT

## 2018-05-25 RX ORDER — MELATONIN
325 2 TIMES DAILY
Status: DISCONTINUED | OUTPATIENT
Start: 2018-05-25 | End: 2018-05-26

## 2018-05-25 RX ORDER — ATORVASTATIN CALCIUM 20 MG/1
20 TABLET, FILM COATED ORAL NIGHTLY
Status: DISCONTINUED | OUTPATIENT
Start: 2018-05-25 | End: 2018-05-26

## 2018-05-25 RX ORDER — DIGOXIN 125 MCG
125 TABLET ORAL
Status: DISCONTINUED | OUTPATIENT
Start: 2018-05-26 | End: 2018-05-26

## 2018-05-25 RX ORDER — NALOXONE HYDROCHLORIDE 0.4 MG/ML
80 INJECTION, SOLUTION INTRAMUSCULAR; INTRAVENOUS; SUBCUTANEOUS AS NEEDED
Status: ACTIVE | OUTPATIENT
Start: 2018-05-25 | End: 2018-05-25

## 2018-05-25 RX ORDER — CARVEDILOL 12.5 MG/1
25 TABLET ORAL 2 TIMES DAILY WITH MEALS
Status: DISCONTINUED | OUTPATIENT
Start: 2018-05-25 | End: 2018-05-26

## 2018-05-25 RX ORDER — MONTELUKAST SODIUM 10 MG/1
10 TABLET ORAL NIGHTLY
Status: DISCONTINUED | OUTPATIENT
Start: 2018-05-25 | End: 2018-05-26

## 2018-05-25 RX ORDER — POTASSIUM CHLORIDE 20 MEQ/1
40 TABLET, EXTENDED RELEASE ORAL EVERY 4 HOURS
Status: COMPLETED | OUTPATIENT
Start: 2018-05-25 | End: 2018-05-25

## 2018-05-25 RX ORDER — SODIUM CHLORIDE, SODIUM LACTATE, POTASSIUM CHLORIDE, CALCIUM CHLORIDE 600; 310; 30; 20 MG/100ML; MG/100ML; MG/100ML; MG/100ML
INJECTION, SOLUTION INTRAVENOUS CONTINUOUS
Status: DISCONTINUED | OUTPATIENT
Start: 2018-05-25 | End: 2018-05-25

## 2018-05-25 RX ORDER — HYDROMORPHONE HYDROCHLORIDE 1 MG/ML
0.4 INJECTION, SOLUTION INTRAMUSCULAR; INTRAVENOUS; SUBCUTANEOUS EVERY 5 MIN PRN
Status: ACTIVE | OUTPATIENT
Start: 2018-05-25 | End: 2018-05-25

## 2018-05-25 RX ORDER — ACETAMINOPHEN AND CODEINE PHOSPHATE 300; 30 MG/1; MG/1
2 TABLET ORAL EVERY 4 HOURS PRN
Status: DISCONTINUED | OUTPATIENT
Start: 2018-05-25 | End: 2018-05-26

## 2018-05-25 RX ORDER — ONDANSETRON 2 MG/ML
4 INJECTION INTRAMUSCULAR; INTRAVENOUS EVERY 6 HOURS PRN
Status: DISCONTINUED | OUTPATIENT
Start: 2018-05-25 | End: 2018-05-26

## 2018-05-25 RX ORDER — ASPIRIN 81 MG/1
81 TABLET, CHEWABLE ORAL DAILY
Status: DISCONTINUED | OUTPATIENT
Start: 2018-05-25 | End: 2018-05-26

## 2018-05-25 RX ORDER — SODIUM CHLORIDE 9 MG/ML
INJECTION, SOLUTION INTRAVENOUS CONTINUOUS
Status: DISCONTINUED | OUTPATIENT
Start: 2018-05-25 | End: 2018-05-26

## 2018-05-25 RX ORDER — CEFAZOLIN SODIUM 1 G/3ML
INJECTION, POWDER, FOR SOLUTION INTRAMUSCULAR; INTRAVENOUS
Status: DISCONTINUED | OUTPATIENT
Start: 2018-05-25 | End: 2018-05-25 | Stop reason: ALTCHOICE

## 2018-05-25 RX ORDER — BUMETANIDE 2 MG/1
2 TABLET ORAL DAILY
Status: DISCONTINUED | OUTPATIENT
Start: 2018-05-25 | End: 2018-05-26

## 2018-05-25 RX ORDER — ONDANSETRON 2 MG/ML
4 INJECTION INTRAMUSCULAR; INTRAVENOUS AS NEEDED
Status: ACTIVE | OUTPATIENT
Start: 2018-05-25 | End: 2018-05-25

## 2018-05-25 RX ORDER — HEPARIN SODIUM 5000 [USP'U]/ML
INJECTION, SOLUTION INTRAVENOUS; SUBCUTANEOUS
Status: COMPLETED
Start: 2018-05-25 | End: 2018-05-25

## 2018-05-25 RX ORDER — CEFAZOLIN SODIUM/WATER 2 G/20 ML
2 SYRINGE (ML) INTRAVENOUS
Status: COMPLETED | OUTPATIENT
Start: 2018-05-25 | End: 2018-05-25

## 2018-05-25 NOTE — PROCEDURES
PROCEDURE PERFORMED:   1. Watchman device implant. 2. Transseptal catheterization with left atrial pressure recording. INDICATIONS FOR PROCEDURE: History of bleeding (GI) on xarelto and Coumadin.  CHADSVasc score 6    OPERATORS: Lee Brumfield MD, Ca was evaluated by measuring percentage of compression between 8% and 20%. Also, a seal of the device was measured by color flow Doppler, the size of the residual leak was 0.   The device was then released from the delivery sheath, and the delivery sheath and

## 2018-05-25 NOTE — ANESTHESIA PREPROCEDURE EVALUATION
PRE-OP EVALUATION    Patient Name: Royer Atkins    Pre-op Diagnosis: atrial fibrillation    Procedure(s): Watchman    Surgeon(s) and Role:     Marybeth Goldberg MD - Primary     * Ashleigh Fierro MD    Pre-op vitals reviewed.   Temp: 97.5 °F (36.4 °C)  Pu Complications  (-) history of anesthetic complications         GI/Hepatic/Renal  Comment: Had GI bleeding has been on coumadin/xarelto           (+) chronic renal disease and CRI                   Cardiovascular  Comment: Chronic atrial fibrillation for Wa 30.1 (L) 05/22/2018   RDW 20.2 (H) 05/22/2018   .0 05/22/2018   MPV 9.7 05/08/2018       Lab Results  Component Value Date    05/22/2018   K 4.5 05/22/2018   CL 96 (L) 05/22/2018   CO2 34.0 (H) 05/22/2018   BUN 44 (H) 05/22/2018   CREATSERUM 1

## 2018-05-25 NOTE — ANESTHESIA POSTPROCEDURE EVALUATION
[place] [address] Patient Status:  Inpatient   Age/Gender 67year old female MRN [id number]   Location [place] [address] Attending Gilbert Bianchi MD   Hosp Day # 0 PCP Anthony Pereira MD       Anesthesia Post-op Note

## 2018-05-25 NOTE — PROGRESS NOTES
[facility]  HORACIO Note    Sheng Flores Location:      N [id number]   Admission Date 5/25/2018 Operation Date 5/25/2018   Attending Physician Chau Roger MD Operating Physician Foy Jeans, MD     Pre-Operative Diagnosis: Atrial fibrillation, H

## 2018-05-25 NOTE — PROGRESS NOTES
Met with patient. Explained post procedure instructions/follow up. Pt verbalized understanding. Pt will have 6mo post HORACIO with Dr Miranda Chavez.

## 2018-05-26 ENCOUNTER — APPOINTMENT (OUTPATIENT)
Dept: CV DIAGNOSTICS | Facility: HOSPITAL | Age: 73
DRG: 274 | End: 2018-05-26
Attending: INTERNAL MEDICINE
Payer: MEDICARE

## 2018-05-26 VITALS
RESPIRATION RATE: 16 BRPM | SYSTOLIC BLOOD PRESSURE: 107 MMHG | HEART RATE: 70 BPM | OXYGEN SATURATION: 90 % | TEMPERATURE: 99 F | DIASTOLIC BLOOD PRESSURE: 46 MMHG

## 2018-05-26 PROCEDURE — 80048 BASIC METABOLIC PNL TOTAL CA: CPT | Performed by: INTERNAL MEDICINE

## 2018-05-26 PROCEDURE — 82962 GLUCOSE BLOOD TEST: CPT

## 2018-05-26 PROCEDURE — 85610 PROTHROMBIN TIME: CPT | Performed by: INTERNAL MEDICINE

## 2018-05-26 PROCEDURE — 93306 TTE W/DOPPLER COMPLETE: CPT | Performed by: INTERNAL MEDICINE

## 2018-05-26 NOTE — PLAN OF CARE
CARDIOVASCULAR - ADULT    • Maintains optimal cardiac output and hemodynamic stability Progressing        Patient/Family Goals    • Patient/Family Long Term Goal Progressing    • Patient/Family Short Term Goal Progressing        Assumed care at 0700, pt ao

## 2018-05-26 NOTE — H&P
[facility]  H&P    Rubina Gonzaloblanquita Patient Status:  Inpatient    1945 MRN [id number]   [facility] 6NE-A Attending Jonas Tran MD   Hosp Day # 1 PCP Josee Mendoza MD     Reason for Admission:  [facility] excision   • Cataract 2004    OU   • CKD (chronic kidney disease) stage 3, GFR 30-59 ml/min    • Congenital anomaly of heart    • Congestive heart disease (HCC)    • COPD (chronic obstructive pulmonary disease) ([facility] [address]    • Diabetes ([facility]    • Disorder of ey UNKNOWN    Medications:    Current Facility-Administered Medications:   •  Glucose-Vitamin C (DEX-4) 4-0.006 g chewable tab 4 tablet, 4 tablet, Oral, Once  •  0.9%  NaCl infusion, , Intravenous, Continuous  •  acetaminophen (TYLENOL) tab 650 mg, 650 mg, Or affect. Skin: Warm and dry.      Laboratories and Data:  Diagnostics:    Labs:       Lab Results  Component Value Date   WBC 5.9 05/25/2018   HGB 10.0 05/25/2018   HCT 32.3 05/25/2018   .0 05/25/2018   CREATSERUM 1.49 05/26/2018   BUN 46 05/26/2018

## 2018-05-26 NOTE — PROGRESS NOTES
[facility]  Progress Note    Maria D Casillas Patient Status:  Inpatient    1945 MRN [id number]   [facility] 6NE-A Attending Kvng Fernandez MD   Hosp Day # 1 PCP Erin Fowler MD     Assessment:  Status post [facility] procedure with viraj Glucose-Vitamin C  4 tablet Oral Once   • aspirin  81 mg Oral Daily   • bumetanide  2 mg Oral Daily   • carvedilol  25 mg Oral BID with meals   • digoxin  125 mcg Oral Daily   • ferrous sulfate  325 mg Oral BID   • Insulin NPH & Regular 70/30  70 Units Sub

## 2018-05-26 NOTE — PROCEDURES
[facility]    PATIENT'S NAME: Cody Winnie   ATTENDING PHYSICIAN: Randy Veronica M.D. OPERATING PHYSICIAN: Eleanor Dang M.D.    PATIENT ACCOUNT#:   [de-identified]    LOCATION:  [address] [place] [address]  MEDICAL RECORD #:   [id number]       DATE OF BIRTH:  [birth year] occluder device. Transeptal heart catheterization was performed with HORACIO guidance. Appropriate positioning in the inferior-posterior portion of the atrial septum with tenting was confirmed prior to the septal puncture being performed.   Two and 3-dimens

## 2018-05-27 ENCOUNTER — TELEPHONE (OUTPATIENT)
Dept: INTERNAL MEDICINE CLINIC | Facility: CLINIC | Age: 73
End: 2018-05-27

## 2018-05-27 NOTE — TELEPHONE ENCOUNTER
I spoke with the patient. She had the watchman placed on Friday. She told him that she was allergic to iodine. She is not certain whether she got any or not. Yesterday she developed itchiness in her legs and chest.  And redness in the feet.   No shortne

## 2018-05-29 ENCOUNTER — PATIENT OUTREACH (OUTPATIENT)
Dept: CASE MANAGEMENT | Age: 73
End: 2018-05-29

## 2018-05-29 ENCOUNTER — TELEPHONE (OUTPATIENT)
Dept: OTHER | Age: 73
End: 2018-05-29

## 2018-05-29 ENCOUNTER — PRIOR ORIGINAL RECORDS (OUTPATIENT)
Dept: OTHER | Age: 73
End: 2018-05-29

## 2018-05-29 NOTE — TELEPHONE ENCOUNTER
Unable to fit the patient into the schedule today. Can she see Sharyle Najjar tomorrow? If not, I could open slot sometime in my schedule tomorrow to see her.

## 2018-05-29 NOTE — TELEPHONE ENCOUNTER
Dr Cruz=please see message below and advise regarding the itchiness,  Requesting OV after 5 PM today, states that cannot take prednisone due to her blood sugar.     Patient calling and states that she spoke with Dr Justine Guerin over the weekend and advised to

## 2018-05-29 NOTE — PROGRESS NOTES
TCM OUTREACH    Call made to attempt to reach patient for TCM follow up. No answer, Voicemail left requesting call back at [phone].     Book by date: 6/9/18    (Triage Team if pt returns call please transfer to ext [phone])

## 2018-05-29 NOTE — TELEPHONE ENCOUNTER
Called patient and advised Dr Mk Hernandez note, [address] made with Verline Patience for tomorrow at 2 PM.    Future Appointments  Date Time Provider Bao Barakat   5/30/2018 2:00 PM Taylor SantosJORGE [facility]          Note      Unable to fit the patient

## 2018-05-30 ENCOUNTER — OFFICE VISIT (OUTPATIENT)
Dept: INTERNAL MEDICINE CLINIC | Facility: CLINIC | Age: 73
End: 2018-05-30

## 2018-05-30 VITALS
TEMPERATURE: 98 F | HEART RATE: 71 BPM | SYSTOLIC BLOOD PRESSURE: 100 MMHG | BODY MASS INDEX: 38.09 KG/M2 | WEIGHT: 207 LBS | HEIGHT: 62 IN | DIASTOLIC BLOOD PRESSURE: 66 MMHG

## 2018-05-30 DIAGNOSIS — R21 RASH: Primary | ICD-10-CM

## 2018-05-30 PROCEDURE — 99213 OFFICE O/P EST LOW 20 MIN: CPT | Performed by: PHYSICIAN ASSISTANT

## 2018-05-30 RX ORDER — LEVOCETIRIZINE DIHYDROCHLORIDE 5 MG/1
5 TABLET, FILM COATED ORAL EVERY EVENING
Qty: 30 TABLET | Refills: 0 | Status: SHIPPED | OUTPATIENT
Start: 2018-05-30 | End: 2018-06-29 | Stop reason: ALTCHOICE

## 2018-05-30 RX ORDER — MOMETASONE FUROATE 1 MG/G
1 CREAM TOPICAL DAILY
Qty: 50 G | Refills: 1 | Status: SHIPPED | OUTPATIENT
Start: 2018-05-30 | End: 2019-01-01

## 2018-05-30 NOTE — PROGRESS NOTES
HPI:    Brittanie Gates is a 67year old female here today for hospital follow up.    She was discharged from {Discharged from which location:7092} to {Discharged to which location:7093::\"Home\"}   Admit Date: ***  Discharge Date: [facility] Discharge D mouth daily with food. (Patient taking differently: Take by mouth daily with food.  )   Polyethylene Glycol 3350 (MIRALAX) Oral Powder Take 17 g by mouth as needed.    Ferrous Sulfate 325 (65 Fe) MG Oral Tab Take 1 tablet (325 mg total) by mouth 2 (two) miguelina Osteoarthritis; Pulmonary emphysema ([address] and Retinal tear. She  has a past surgical history that includes cabg (2010); Repair Retinal Breaks, Cryotherapy - OD - Right Eye; Cardiac pacemaker placement (2012);  Cataract extraction w/  intraocular lens im Risk:   {If the Risk Level is Low or if over 14 days Post Discharge use the Standard E&M LOS Coding, Not TCM.  Elements for Each Level of Medical Decision Making Type of Decision Making__ 2/3 High and seen within 7 days equals High 72861 whereas 2/3 Fermin Navarrete

## 2018-05-31 ENCOUNTER — PRIOR ORIGINAL RECORDS (OUTPATIENT)
Dept: OTHER | Age: 73
End: 2018-05-31

## 2018-05-31 NOTE — PROGRESS NOTES
HPI:    Patient ID: Kevan Boothe is a 67year old female. HPI   Patient presents today with concerns of lower extremity pain, swelling, and pruritus. She recently underwent watchman procedure for management of atrial fibrillation  On 5/25/18.   Fluoros Sodium 10 MG Oral Tab Take 10 mg by mouth nightly. Disp:  Rfl:    rivaroxaban 15 MG Oral Tab Take 1 tablet (15 mg total) by mouth daily with food.  (Patient taking differently: Take by mouth daily with food.  ) Disp: 30 tablet Rfl: 5   Polyethylene Glycol 3 RASH  Povidone Iodine         ITCHING    Comment:Rash/Itching following surgical prep  Iodine  [Gnp Iodine]    UNKNOWN   PHYSICAL EXAM:   Physical Exam   Nursing note and vitals reviewed. Constitutional: She appears well-developed and well-nourished.    H

## 2018-06-01 ENCOUNTER — PRIOR ORIGINAL RECORDS (OUTPATIENT)
Dept: OTHER | Age: 73
End: 2018-06-01

## 2018-06-05 ENCOUNTER — PRIOR ORIGINAL RECORDS (OUTPATIENT)
Dept: OTHER | Age: 73
End: 2018-06-05

## 2018-06-05 ENCOUNTER — NURSE TRIAGE (OUTPATIENT)
Dept: OTHER | Age: 73
End: 2018-06-05

## 2018-06-05 ENCOUNTER — TELEPHONE (OUTPATIENT)
Dept: ENDOCRINOLOGY CLINIC | Facility: CLINIC | Age: 73
End: 2018-06-05

## 2018-06-05 RX ORDER — TRAMADOL HYDROCHLORIDE 50 MG/1
50 TABLET ORAL EVERY 8 HOURS PRN
Qty: 15 TABLET | Refills: 0 | Status: ON HOLD | OUTPATIENT
Start: 2018-06-05 | End: 2018-07-09

## 2018-06-05 NOTE — TELEPHONE ENCOUNTER
Rx for Tramadol signed. There is no prescription for Allegra as it is over the counter. If this is not working for her allergies, she can try switching to Zyrtec or Claritin. She may also consider nasal saline spray or OTC flonase if needed.

## 2018-06-05 NOTE — TELEPHONE ENCOUNTER
Pt would like to try Tramadol. Informed her should f/u with ortho and gave her phone number.  Pt also requesting for scripts of allegra to be sent in, states she usually takes OTC but was wondering if she could get script because her allergies are bothering

## 2018-06-05 NOTE — TELEPHONE ENCOUNTER
Contacted pt. She states she knows she needs to get in to see AM. She canceled her previous appt in April and then ended up in the hospital for rectal bleeding. She went through many procedures.  She's been home for a couple weeks but really not feeling wel

## 2018-06-05 NOTE — TELEPHONE ENCOUNTER
Please reply to pool: EM RN TRIAGE  Action Requested: Summary for Provider     []  Critical Lab, Recommendations Needed  [x] Need Additional Advice  []   FYI    [x]   Need Orders  [] Need Medications Sent to Pharmacy  []  Other     SUMMARY: Requesting

## 2018-06-05 NOTE — TELEPHONE ENCOUNTER
We could consider stronger pain medication such as short course of Tramadol or Norco. Also agree that evaluation by ortho for possible injection is a good idea.

## 2018-06-05 NOTE — TELEPHONE ENCOUNTER
Pt called back wondering about pain management. Declined appt or immediate care, does not have transportation.  Pt saw Dr Johnson Levels about issue in here are notes from visit:   M25.561) Acute pain of right knee  (primary encounter diagnosis)  Plan: suspect this is

## 2018-06-07 NOTE — TELEPHONE ENCOUNTER
Pt called back, she has received her Tramadol prescription. Also informed her of recommendations for allergy meds. Pt states that the Geradine Anger is working, it is just very expensive. She tried the cheaper generic and it didn't work as well.  She will continue

## 2018-06-11 ENCOUNTER — MYAURORA ACCOUNT LINK (OUTPATIENT)
Dept: OTHER | Age: 73
End: 2018-06-11

## 2018-06-12 ENCOUNTER — TELEPHONE (OUTPATIENT)
Dept: GASTROENTEROLOGY | Facility: CLINIC | Age: 73
End: 2018-06-12

## 2018-06-12 NOTE — TELEPHONE ENCOUNTER
----- Message from Steffi Tejeda MD sent at 6/11/2018  5:10 PM CDT -----  Please inform patient that I have reviewed the EGD done on 4/19/18 at [facility].

## 2018-06-19 RX ORDER — CARVEDILOL 12.5 MG/1
TABLET ORAL
Qty: 360 TABLET | Refills: 0 | Status: ON HOLD | OUTPATIENT
Start: 2018-06-19 | End: 2018-07-09

## 2018-06-19 NOTE — TELEPHONE ENCOUNTER
please advise as does not meet RN protocol for refill criteria    Hypertensive Medications  Protocol Criteria:  · Appointment scheduled in the past 6 months or in the next 3 months  · BMP or CMP in the past 12 months  · Creatinine result < 2  Recent Outpa [place] Comment, [employer]    Office Visit    2 months ago Polyp of colon, unspecified part of colon, unspecified type    [facility], [facility], [address] Comment, [place]    Office Visit    3 months ago Acute pain of right knee    [facility]

## 2018-06-20 RX ORDER — DIGOXIN 125 UG/1
TABLET ORAL
Qty: 90 TABLET | Refills: 1 | Status: SHIPPED | OUTPATIENT
Start: 2018-06-20 | End: 2018-01-01

## 2018-06-21 NOTE — TELEPHONE ENCOUNTER
Pt requesting 90 days refills for Humalin 70/30. Pls call - pt has appt to see AM on 7/9/2018. Thank you.       Current Outpatient Prescriptions:  Insulin NPH & Regular (HUMULIN 70/30 KWIKPEN) (70-30) 100 UNIT/ML Subcutaneous Suspension Pen-injector Injec

## 2018-06-22 ENCOUNTER — TELEPHONE (OUTPATIENT)
Dept: ENDOCRINOLOGY CLINIC | Facility: CLINIC | Age: 73
End: 2018-06-22

## 2018-06-22 NOTE — TELEPHONE ENCOUNTER
Current Outpatient Prescriptions:  Insulin NPH & Regular (HUMULIN 70/30 KWIKPEN) (70-30) 100 UNIT/ML Subcutaneous Suspension Pen-injector Inject 70 Units into the skin 2 (two) times daily.  Disp: 120 mL Rfl: 0     PA request Max daily dose exceeded pls ca

## 2018-06-26 ENCOUNTER — TELEPHONE (OUTPATIENT)
Dept: INTERNAL MEDICINE CLINIC | Facility: CLINIC | Age: 73
End: 2018-06-26

## 2018-06-26 RX ORDER — MONTELUKAST SODIUM 10 MG/1
TABLET ORAL
Qty: 90 TABLET | Refills: 0 | Status: SHIPPED | OUTPATIENT
Start: 2018-06-26 | End: 2018-01-01

## 2018-06-26 NOTE — TELEPHONE ENCOUNTER
Refill Protocol Appointment Criteria  · Appointment scheduled in the past 6 months or in the next 3 months  Recent Outpatient Visits            3 weeks ago [facility], [address] [facility], [address] [place] [address]    Office Visit    1 month ago [facility]

## 2018-06-26 NOTE — TELEPHONE ENCOUNTER
Pt states Sunday she was taking a shower and was washing her feet and noticed something on her left 2nd toe, states she pulled it and the entire toenail came off. Pt states it did not bleed and didn't hurt that much.  Pt states she saw her podiatrist yester

## 2018-06-27 ENCOUNTER — TELEPHONE (OUTPATIENT)
Dept: OTHER | Age: 73
End: 2018-06-27

## 2018-06-27 ENCOUNTER — OFFICE VISIT (OUTPATIENT)
Dept: CARDIOLOGY CLINIC | Facility: HOSPITAL | Age: 73
DRG: 291 | End: 2018-06-27
Attending: NURSE PRACTITIONER
Payer: MEDICARE

## 2018-06-27 ENCOUNTER — PRIOR ORIGINAL RECORDS (OUTPATIENT)
Dept: OTHER | Age: 73
End: 2018-06-27

## 2018-06-27 VITALS
SYSTOLIC BLOOD PRESSURE: 121 MMHG | BODY MASS INDEX: 38 KG/M2 | DIASTOLIC BLOOD PRESSURE: 53 MMHG | WEIGHT: 210 LBS | HEART RATE: 59 BPM | OXYGEN SATURATION: 90 %

## 2018-06-27 DIAGNOSIS — I50.22 CHRONIC SYSTOLIC HEART FAILURE (HCC): ICD-10-CM

## 2018-06-27 DIAGNOSIS — I50.9 HEART FAILURE, UNSPECIFIED (HCC): Primary | ICD-10-CM

## 2018-06-27 PROCEDURE — 99214 OFFICE O/P EST MOD 30 MIN: CPT | Performed by: CLINICAL NURSE SPECIALIST

## 2018-06-27 PROCEDURE — 99211 OFF/OP EST MAY X REQ PHY/QHP: CPT | Performed by: CLINICAL NURSE SPECIALIST

## 2018-06-27 PROCEDURE — 80048 BASIC METABOLIC PNL TOTAL CA: CPT | Performed by: CLINICAL NURSE SPECIALIST

## 2018-06-27 PROCEDURE — 83880 ASSAY OF NATRIURETIC PEPTIDE: CPT | Performed by: CLINICAL NURSE SPECIALIST

## 2018-06-27 PROCEDURE — 96374 THER/PROPH/DIAG INJ IV PUSH: CPT | Performed by: CLINICAL NURSE SPECIALIST

## 2018-06-27 PROCEDURE — 36415 COLL VENOUS BLD VENIPUNCTURE: CPT | Performed by: CLINICAL NURSE SPECIALIST

## 2018-06-27 RX ORDER — FUROSEMIDE 10 MG/ML
INJECTION INTRAMUSCULAR; INTRAVENOUS
Status: COMPLETED
Start: 2018-06-27 | End: 2018-06-27

## 2018-06-27 RX ORDER — POTASSIUM CHLORIDE 20 MEQ/1
TABLET, EXTENDED RELEASE ORAL
Status: COMPLETED
Start: 2018-06-27 | End: 2018-06-27

## 2018-06-27 RX ADMIN — FUROSEMIDE 40 MG: 10 INJECTION INTRAMUSCULAR; INTRAVENOUS at 11:39:00

## 2018-06-27 RX ADMIN — POTASSIUM CHLORIDE 20 MEQ: 20 TABLET, EXTENDED RELEASE ORAL at 11:39:00

## 2018-06-27 NOTE — PATIENT INSTRUCTIONS
Starting tomorrow, for the next three days, please take bumex 2 mg in the am and bumex 2 mg in the afternoon (Thursday, Friday and Saturday) and then Sunday, please continue bumex 2 mg daily. Do not taken any more metolazone this week.      Please repea

## 2018-06-27 NOTE — PROGRESS NOTES
[facility] Patient Status:  Outpatient    1945 MRN [id number]   Location MD Dr Odette Berumen is a 67year old female who presents to clinic for assessm per APN  20 meq potassium given per APN    General appearance: alert, appears stated age and cooperative  Neck: +JVD  Lungs: clear to auscultation bilaterally  Chest wall: no tenderness  Heart: S1, S2 normal, no murmur, click, rub or gallop, regular rate a doesn't weigh at home. Last [facility] note in April, weight 200 lbs. Today in clinic 210 lbs. Patient states that on her own, she took metolazone 2.5 mg yesterday - this is an as needed med for her. She reports no improvement to her symptoms today.    +Orthopnea failure.       Martha MondayMIRI  6/27/2018  10:13 AM

## 2018-06-27 NOTE — TELEPHONE ENCOUNTER
Pt requesting appt after afib OV today having breathing diff, swollen ext-x 2few day, Hx -heart issues, advised ER if Breathing is worse prior to appt with afib clinic - Pt agreed

## 2018-06-28 ENCOUNTER — TELEPHONE (OUTPATIENT)
Dept: CARDIOLOGY CLINIC | Facility: HOSPITAL | Age: 73
End: 2018-06-28

## 2018-06-28 NOTE — TELEPHONE ENCOUNTER
Patient called HF clinic to inform that she is having severe sob and is not feeling any better after office visit yesterday. MIRI Quiles was informed of pt's condition update and per verbal orders from MIRI, pt was advised to go to Emergency Room.

## 2018-06-29 ENCOUNTER — APPOINTMENT (OUTPATIENT)
Dept: GENERAL RADIOLOGY | Facility: HOSPITAL | Age: 73
DRG: 291 | End: 2018-06-29
Payer: MEDICARE

## 2018-06-29 ENCOUNTER — HOSPITAL ENCOUNTER (INPATIENT)
Facility: HOSPITAL | Age: 73
LOS: 10 days | Discharge: HOME HEALTH | DRG: 291 | End: 2018-07-09
Attending: EMERGENCY MEDICINE | Admitting: HOSPITALIST
Payer: MEDICARE

## 2018-06-29 DIAGNOSIS — I50.22 CHRONIC SYSTOLIC HEART FAILURE (HCC): Primary | ICD-10-CM

## 2018-06-29 DIAGNOSIS — R06.02 SHORTNESS OF BREATH: ICD-10-CM

## 2018-06-29 PROBLEM — I50.23 ACUTE ON CHRONIC SYSTOLIC HEART FAILURE (HCC): Status: ACTIVE | Noted: 2018-06-29

## 2018-06-29 LAB
ANION GAP SERPL CALC-SCNC: 13 MMOL/L (ref 0–18)
BACTERIA UR QL AUTO: NEGATIVE /HPF
BASOPHILS # BLD: 0 K/UL (ref 0–0.2)
BASOPHILS NFR BLD: 1 %
BILIRUB UR QL: NEGATIVE
BUN SERPL-MCNC: 60 MG/DL (ref 8–20)
BUN/CREAT SERPL: 39 (ref 10–20)
CALCIUM SERPL-MCNC: 9.2 MG/DL (ref 8.5–10.5)
CHLORIDE SERPL-SCNC: 95 MMOL/L (ref 95–110)
CLARITY UR: CLEAR
CO2 SERPL-SCNC: 34 MMOL/L (ref 22–32)
COLOR UR: YELLOW
CREAT SERPL-MCNC: 1.54 MG/DL (ref 0.5–1.5)
EOSINOPHIL # BLD: 0.1 K/UL (ref 0–0.7)
EOSINOPHIL NFR BLD: 1 %
ERYTHROCYTE [DISTWIDTH] IN BLOOD BY AUTOMATED COUNT: 16.1 % (ref 11–15)
ERYTHROCYTE [DISTWIDTH] IN BLOOD BY AUTOMATED COUNT: 16.2 % (ref 11–15)
GLUCOSE BLDC GLUCOMTR-MCNC: 123 MG/DL (ref 70–99)
GLUCOSE BLDC GLUCOMTR-MCNC: 54 MG/DL (ref 70–99)
GLUCOSE BLDC GLUCOMTR-MCNC: 87 MG/DL (ref 70–99)
GLUCOSE BLDC GLUCOMTR-MCNC: 94 MG/DL (ref 70–99)
GLUCOSE SERPL-MCNC: 293 MG/DL (ref 70–99)
GLUCOSE UR-MCNC: NEGATIVE MG/DL
HCT VFR BLD AUTO: 36.8 % (ref 35–48)
HCT VFR BLD AUTO: 37.2 % (ref 35–48)
HGB BLD-MCNC: 11.8 G/DL (ref 12–16)
HGB BLD-MCNC: 11.9 G/DL (ref 12–16)
HGB UR QL STRIP.AUTO: NEGATIVE
HYALINE CASTS #/AREA URNS AUTO: 1 /LPF
KETONES UR-MCNC: NEGATIVE MG/DL
LEUKOCYTE ESTERASE UR QL STRIP.AUTO: NEGATIVE
LYMPHOCYTES # BLD: 0.5 K/UL (ref 1–4)
LYMPHOCYTES NFR BLD: 8 %
MCH RBC QN AUTO: 30.1 PG (ref 27–32)
MCH RBC QN AUTO: 30.2 PG (ref 27–32)
MCHC RBC AUTO-ENTMCNC: 32 G/DL (ref 32–37)
MCHC RBC AUTO-ENTMCNC: 32.1 G/DL (ref 32–37)
MCV RBC AUTO: 94 FL (ref 80–100)
MCV RBC AUTO: 94 FL (ref 80–100)
MONOCYTES # BLD: 0.6 K/UL (ref 0–1)
MONOCYTES NFR BLD: 9 %
NEUTROPHILS # BLD AUTO: 5 K/UL (ref 1.8–7.7)
NEUTROPHILS NFR BLD: 81 %
NITRITE UR QL STRIP.AUTO: NEGATIVE
OSMOLALITY UR CALC.SUM OF ELEC: 322 MOSM/KG (ref 275–295)
PH UR: 6 [PH] (ref 5–8)
PLATELET # BLD AUTO: 138 K/UL (ref 140–400)
PLATELET # BLD AUTO: 142 K/UL (ref 140–400)
PMV BLD AUTO: 9.1 FL (ref 7.4–10.3)
PMV BLD AUTO: 9.1 FL (ref 7.4–10.3)
POTASSIUM SERPL-SCNC: 3.9 MMOL/L (ref 3.3–5.1)
PROT UR-MCNC: NEGATIVE MG/DL
RBC # BLD AUTO: 3.91 M/UL (ref 3.7–5.4)
RBC # BLD AUTO: 3.96 M/UL (ref 3.7–5.4)
RBC #/AREA URNS AUTO: 1 /HPF
SODIUM SERPL-SCNC: 142 MMOL/L (ref 136–144)
SP GR UR STRIP: 1.01 (ref 1–1.03)
UROBILINOGEN UR STRIP-ACNC: <2
VIT C UR-MCNC: 20 MG/DL
WBC # BLD AUTO: 6.1 K/UL (ref 4–11)
WBC # BLD AUTO: 6.6 K/UL (ref 4–11)
WBC #/AREA URNS AUTO: 1 /HPF

## 2018-06-29 PROCEDURE — 71045 X-RAY EXAM CHEST 1 VIEW: CPT | Performed by: EMERGENCY MEDICINE

## 2018-06-29 PROCEDURE — 99223 1ST HOSP IP/OBS HIGH 75: CPT | Performed by: HOSPITALIST

## 2018-06-29 RX ORDER — DEXTROSE MONOHYDRATE 25 G/50ML
50 INJECTION, SOLUTION INTRAVENOUS AS NEEDED
Status: DISCONTINUED | OUTPATIENT
Start: 2018-06-29 | End: 2018-07-09

## 2018-06-29 RX ORDER — 0.9 % SODIUM CHLORIDE 0.9 %
VIAL (ML) INJECTION
Status: COMPLETED
Start: 2018-06-29 | End: 2018-06-29

## 2018-06-29 RX ORDER — NITROGLYCERIN 0.4 MG/1
0.4 TABLET SUBLINGUAL EVERY 5 MIN PRN
Status: DISCONTINUED | OUTPATIENT
Start: 2018-06-29 | End: 2018-07-09

## 2018-06-29 RX ORDER — ATORVASTATIN CALCIUM 20 MG/1
20 TABLET, FILM COATED ORAL NIGHTLY
Status: DISCONTINUED | OUTPATIENT
Start: 2018-06-29 | End: 2018-07-09

## 2018-06-29 RX ORDER — ACETAMINOPHEN 325 MG/1
650 TABLET ORAL EVERY 6 HOURS PRN
Status: DISCONTINUED | OUTPATIENT
Start: 2018-06-29 | End: 2018-07-09

## 2018-06-29 RX ORDER — MONTELUKAST SODIUM 10 MG/1
10 TABLET ORAL NIGHTLY
Status: DISCONTINUED | OUTPATIENT
Start: 2018-06-29 | End: 2018-07-09

## 2018-06-29 RX ORDER — DIGOXIN 125 MCG
125 TABLET ORAL DAILY
Status: DISCONTINUED | OUTPATIENT
Start: 2018-06-30 | End: 2018-07-09

## 2018-06-29 RX ORDER — ASPIRIN 81 MG/1
81 TABLET, CHEWABLE ORAL DAILY
Status: DISCONTINUED | OUTPATIENT
Start: 2018-06-29 | End: 2018-07-09

## 2018-06-29 RX ORDER — HEPARIN SODIUM 5000 [USP'U]/ML
5000 INJECTION, SOLUTION INTRAVENOUS; SUBCUTANEOUS EVERY 12 HOURS SCHEDULED
Status: DISCONTINUED | OUTPATIENT
Start: 2018-06-29 | End: 2018-06-29

## 2018-06-29 RX ORDER — ONDANSETRON 2 MG/ML
4 INJECTION INTRAMUSCULAR; INTRAVENOUS EVERY 6 HOURS PRN
Status: DISCONTINUED | OUTPATIENT
Start: 2018-06-29 | End: 2018-07-09

## 2018-06-29 RX ORDER — SODIUM CHLORIDE 0.9 % (FLUSH) 0.9 %
10 SYRINGE (ML) INJECTION AS NEEDED
Status: DISCONTINUED | OUTPATIENT
Start: 2018-06-29 | End: 2018-07-09

## 2018-06-29 RX ORDER — SODIUM CHLORIDE 0.9 % (FLUSH) 0.9 %
3 SYRINGE (ML) INJECTION AS NEEDED
Status: DISCONTINUED | OUTPATIENT
Start: 2018-06-29 | End: 2018-07-09

## 2018-06-29 RX ORDER — FUROSEMIDE 10 MG/ML
40 INJECTION INTRAMUSCULAR; INTRAVENOUS 2 TIMES DAILY
Status: DISCONTINUED | OUTPATIENT
Start: 2018-06-29 | End: 2018-06-30

## 2018-06-29 RX ORDER — CARVEDILOL 25 MG/1
25 TABLET ORAL 2 TIMES DAILY WITH MEALS
Status: DISCONTINUED | OUTPATIENT
Start: 2018-06-29 | End: 2018-07-09

## 2018-06-29 RX ORDER — TRAMADOL HYDROCHLORIDE 50 MG/1
50 TABLET ORAL EVERY 6 HOURS PRN
Status: DISCONTINUED | OUTPATIENT
Start: 2018-06-29 | End: 2018-07-02

## 2018-06-29 NOTE — ED PROVIDER NOTES
Patient Seen in: [facility] AND [facility] Emergency Department    History   Patient presents with:  Dyspnea JAZLYN SOB (respiratory)    Stated Complaint: sob    HPI    Patient is 70-year-old female who presents to the emergency department with a chief complaint of Location: [facility]                ENDOSCOPY  No date: REPAIR RETINAL BREAKS, CRYOTHERAPY - OD - [facility]*        Smoking status: Former Smoker                                                              Packs/day: 0.00      Years: 0.00         Quit date: 1/1/2003 - Abnormal; Notable for the following:        Result Value    Ascorbic Acid Urine 20  (*)     All other components within normal limits   BASIC METABOLIC PANEL (8) - Abnormal; Notable for the following:     Glucose 293 (*)     CO2 34 (*)     BUN 60 (*) GFR, Non- 31 (*)     GFR, -American 36 (*)     All other components within normal limits   CBC, PLATELET; NO DIFFERENTIAL - Abnormal; Notable for the following:     HGB 11.6 (*)     RDW 15.9 (*)      (*)     All other comp POC Glucose  107 (*)     All other components within normal limits   POCT GLUCOSE - Abnormal; Notable for the following:     POC Glucose  178 (*)     All other components within normal limits   POCT GLUCOSE - Abnormal; Notable for the following:     POC PLATELET.   Procedure                               Abnormality         Status                     ---------                               -----------         ------                     CBC W/ DIFFERENTIAL[[id number]]          Abnormal            Final resul

## 2018-06-29 NOTE — CONSULTS
[facility] - [facility]    Report of Consultation    Iesha Monique Patient Status:  Emergency    1945 MRN [id number]   Location [address] Attending Vania Quiles MD   Hosp Day # 0 PCP Davis Pal MD     Date o breath. Patient went to the heart failure center yesterday despite IV diuretic dosing felt no better so she came to the ER for additional evaluation. She denies chest pain fever chills or coughing. She does not check her weights regularly.   Patient had Ladonna Childress MD;  Location: [address] [place] [address]                ENDOSCOPY  No date: REPAIR RETINAL BREAKS, CRYOTHERAPY - OD - [facility]*    Family History        Family History   Problem Relation Age of Onset   • Stroke Father    • Diabetes Mother    • Stroke Mother bases.  Normal excursions and effort. Abdomen:  Soft, non-tender. No organosplenomegally, mass or rebound, BS-present. Extremities:  Without clubbing, cyanosis or edema. Peripheral pulses are 2+. Neurologic:  Alert and oriented, normal affect.  No motor

## 2018-06-29 NOTE — HISTORICAL OFFICE NOTE
Hansel Garsia  : 1945  ACCOUNT:  [id number]  [phone]  PCP: Dr. Lauren Hernandez    TODAY'S DATE:   2018  DICTATED BY:  MAIDA Sanford ON CALL NOTE    I received a page, called and spoke to Leann at 1:00 a.m. on Erickson, May 27, 2 3  RISK FACTORS:  CAD - Hypertension Weight  CAD Equivalent - Diabetes    REVIEW OF SYSTEMS:    CONS: edema and improved. EYES: denies significant visual changes. ENMT: feels like she is talking into a tunnel/echo in her ears.  CV: Denies chest pain, dizzin LABORATORY DATA: [Echocardiogram with ejection fraction 20%]    DECISION MAKIN.   Atrial fibrillation is permanent rate controlled but no longer anticoagulated due to recurrent severe GI bleeding and severe drop in hemoglobin very symptomatic wit 10MG      1 by mouth daily                         12/07/09 Aspirin Adult Low [id number]      1 by mouth daily

## 2018-06-29 NOTE — PROGRESS NOTES
Humulin 70/30(66 units twice daily)  is nonformulary---interchanged with equivalent doses of Levemir and TID Novolog (with meals).

## 2018-06-29 NOTE — H&P
[facility]    PATIENT'S NAME: Marcial Vargas   ATTENDING PHYSICIAN: Ian Ruiz MD   PATIENT ACCOUNT#:   [id number]    LOCATION:  [place] [address]  MEDICAL RECORD #:   [id number]       YOB: 1945  ADMISSION DATE:       06/29/201 diabetes mellitus type 2, hyperlipidemia, and hypertension. PAST SURGICAL HISTORY:  Coronary artery bypass graft surgery, basal cell carcinoma resection, Watchman device.     MEDICATIONS:  Please see medication reconciliation list.    ALLERGIES:  BuSpar MD  d: 06/29/2018 13:47:41  t: 06/29/2018 13:59:00  Psychiatric [id number]  /

## 2018-06-29 NOTE — ED INITIAL ASSESSMENT (HPI)
Pt c/o increasing sob. Pt was seen yesterday in heart fx clinic and was given iv diuretic. Pt states she was told to come to ed today if she was not feeling better. Pt states she feels more sob today and is having increased swelling in her feet.

## 2018-06-30 LAB
ANION GAP SERPL CALC-SCNC: 9 MMOL/L (ref 0–18)
BUN SERPL-MCNC: 58 MG/DL (ref 8–20)
BUN/CREAT SERPL: 39.5 (ref 10–20)
CALCIUM SERPL-MCNC: 9 MG/DL (ref 8.5–10.5)
CHLORIDE SERPL-SCNC: 96 MMOL/L (ref 95–110)
CO2 SERPL-SCNC: 37 MMOL/L (ref 22–32)
CREAT SERPL-MCNC: 1.47 MG/DL (ref 0.5–1.5)
GLUCOSE BLDC GLUCOMTR-MCNC: 113 MG/DL (ref 70–99)
GLUCOSE BLDC GLUCOMTR-MCNC: 144 MG/DL (ref 70–99)
GLUCOSE BLDC GLUCOMTR-MCNC: 152 MG/DL (ref 70–99)
GLUCOSE BLDC GLUCOMTR-MCNC: 222 MG/DL (ref 70–99)
GLUCOSE BLDC GLUCOMTR-MCNC: 275 MG/DL (ref 70–99)
GLUCOSE BLDC GLUCOMTR-MCNC: 309 MG/DL (ref 70–99)
GLUCOSE SERPL-MCNC: 122 MG/DL (ref 70–99)
HBA1C MFR BLD: 6.5 % (ref 4–6)
MAGNESIUM SERPL-MCNC: 2.1 MG/DL (ref 1.8–2.5)
OSMOLALITY UR CALC.SUM OF ELEC: 311 MOSM/KG (ref 275–295)
POTASSIUM SERPL-SCNC: 3.9 MMOL/L (ref 3.3–5.1)
SODIUM SERPL-SCNC: 142 MMOL/L (ref 136–144)

## 2018-06-30 PROCEDURE — 99233 SBSQ HOSP IP/OBS HIGH 50: CPT | Performed by: HOSPITALIST

## 2018-06-30 NOTE — PROGRESS NOTES
[facility]    Progress Note    Jeff Sr Patient Status:  Inpatient    1945 MRN [id number]   Location [facility] 3W/SW Attending Elder Reynolds MD   Hosp Day # 1 PCP Olivia Mayberry MD       Subjective:     Does not and encouraged healthy diet, regular home meals  She might be able to use just Levemir and oral hypoglycemic with meals  Hypoglycemia the last night (patient also admits to frequent nocturnal hypoglycemia at home )---> adjust insulin for now    H/o GI blee MD  6/30/2018

## 2018-06-30 NOTE — PLAN OF CARE
Pt to be started on bumex gtt. Serna ordered per cardiology.    Pt refusing serna insertion and agreeable to ambulating to bathroom instead

## 2018-06-30 NOTE — PLAN OF CARE
Problem: Patient Centered Care  Goal: Patient preferences are identified and integrated in the patient's plan of care  Interventions:  - What would you like us to know as we care for you? I live at home with my friend.    - Provide timely, complete, and acc medications to optimize hemodynamic stability  - Monitor arterial and/or venous puncture sites for bleeding and/or hematoma  - Assess quality of pulses, skin color and temperature  - Assess for signs of decreased coronary artery perfusion - ex.  Angina  - E Outcome: Progressing    Goal: Glucose maintained within prescribed range  INTERVENTIONS:  - Monitor Blood Glucose as ordered  - Assess for signs and symptoms of hyperglycemia and hypoglycemia  - Administer ordered medications to maintain glucose within t

## 2018-06-30 NOTE — PROGRESS NOTES
[facility] HOSP - [facility]    Cardiology Progress Note    Brittanie Kody Patient Status:  Inpatient    1945 MRN [id number]   Location [facility] 3W/SW Attending Criselda Santacruz MD   Hosp Day # 1 PCP Jeet Monterroso MD     Patient Active 70   Temp 97.7 °F (36.5 °C) (Oral)   Resp 18   Ht 5' 2\" (1.575 m)   Wt 203 lb 4.8 oz (92.2 kg)   SpO2 96%   BMI 37.18 kg/m²     Exam  Gen: No acute distress, alert and oriented   Neck:supple,no JVD  Pulm: Lungs ok, normal respiratory effort  CV: Heart wit Total Output   325      Net   -25 +14                    No results for input(s): BNP in the last 168 hours. Xr Chest Ap Portable  (cpt=71045)    Result Date: 6/29/2018  CONCLUSION:  1. Mild CHF/fluid overload.  2. Probable right perihilar and basilar at

## 2018-07-01 LAB
ANION GAP SERPL CALC-SCNC: 9 MMOL/L (ref 0–18)
BUN SERPL-MCNC: 61 MG/DL (ref 8–20)
BUN/CREAT SERPL: 40.9 (ref 10–20)
CALCIUM SERPL-MCNC: 8.9 MG/DL (ref 8.5–10.5)
CHLORIDE SERPL-SCNC: 96 MMOL/L (ref 95–110)
CO2 SERPL-SCNC: 38 MMOL/L (ref 22–32)
CREAT SERPL-MCNC: 1.49 MG/DL (ref 0.5–1.5)
GLUCOSE BLDC GLUCOMTR-MCNC: 100 MG/DL (ref 70–99)
GLUCOSE BLDC GLUCOMTR-MCNC: 107 MG/DL (ref 70–99)
GLUCOSE BLDC GLUCOMTR-MCNC: 178 MG/DL (ref 70–99)
GLUCOSE BLDC GLUCOMTR-MCNC: 193 MG/DL (ref 70–99)
GLUCOSE SERPL-MCNC: 121 MG/DL (ref 70–99)
MAGNESIUM SERPL-MCNC: 1.9 MG/DL (ref 1.8–2.5)
OSMOLALITY UR CALC.SUM OF ELEC: 315 MOSM/KG (ref 275–295)
POTASSIUM SERPL-SCNC: 4.1 MMOL/L (ref 3.3–5.1)
SODIUM SERPL-SCNC: 143 MMOL/L (ref 136–144)

## 2018-07-01 PROCEDURE — 99233 SBSQ HOSP IP/OBS HIGH 50: CPT | Performed by: HOSPITALIST

## 2018-07-01 NOTE — PROGRESS NOTES
[facility] HOSP - [facility]    Cardiology Progress Note    Iesha Monique Patient Status:  Inpatient    1945 MRN [id number]   Location [facility] 3W/SW Attending Vincent Scott MD   Hosp Day # 2 PCP Davis Pal MD     Patient Active rate and rhythm,no pathologic murmur  Abd: Abdomen soft, nontender, nondistended,  bowel sounds present  Ext:  no clubbing, no cyanosis  Neuro: no focal deficits  Skin: no rashes or lesions      Scheduled Meds:   • Insulin Aspart Pen  10 Units Subcutaneous input(s): BNP in the last 168 hours.         Ekg 12-lead    Result Date: 6/29/2018  ECG Report  Interpretation  -------------------------- vnetricular paced rhtyhm ABNORMAL When compared with ECG of 06/29/2018 11:01:03 No significant changes have occurred E

## 2018-07-01 NOTE — PROGRESS NOTES
[facility] - [facility]    Progress Note    Jake Cooney Patient Status:  Inpatient    1945 MRN [id number]   Location [facility] 3W/SW Attending Domonique العلي MD   Hosp Day # 2 PCP Ariella Nair MD       Subjective:     Does not meal schedule, patient would be more suitable for more frequent injections then 70/30, however she does not like to be sticked frequently, f/w Dr. Jaime Lennon as outpatient  D/w pt at length, and encouraged healthy diet, regular home meals  She might be able

## 2018-07-02 ENCOUNTER — APPOINTMENT (OUTPATIENT)
Dept: CV DIAGNOSTICS | Facility: HOSPITAL | Age: 73
DRG: 291 | End: 2018-07-02
Attending: INTERNAL MEDICINE
Payer: MEDICARE

## 2018-07-02 ENCOUNTER — APPOINTMENT (OUTPATIENT)
Dept: INTERVENTIONAL RADIOLOGY/VASCULAR | Facility: HOSPITAL | Age: 73
DRG: 291 | End: 2018-07-02
Attending: INTERNAL MEDICINE
Payer: MEDICARE

## 2018-07-02 ENCOUNTER — APPOINTMENT (OUTPATIENT)
Dept: GENERAL RADIOLOGY | Facility: HOSPITAL | Age: 73
DRG: 291 | End: 2018-07-02
Attending: INTERNAL MEDICINE
Payer: MEDICARE

## 2018-07-02 LAB
ANION GAP SERPL CALC-SCNC: 9 MMOL/L (ref 0–18)
BUN SERPL-MCNC: 64 MG/DL (ref 8–20)
BUN/CREAT SERPL: 39 (ref 10–20)
CALCIUM SERPL-MCNC: 8.7 MG/DL (ref 8.5–10.5)
CHLORIDE SERPL-SCNC: 93 MMOL/L (ref 95–110)
CO2 SERPL-SCNC: 39 MMOL/L (ref 22–32)
CREAT SERPL-MCNC: 1.64 MG/DL (ref 0.5–1.5)
ERYTHROCYTE [DISTWIDTH] IN BLOOD BY AUTOMATED COUNT: 15.9 % (ref 11–15)
GLUCOSE BLDC GLUCOMTR-MCNC: 185 MG/DL (ref 70–99)
GLUCOSE BLDC GLUCOMTR-MCNC: 230 MG/DL (ref 70–99)
GLUCOSE BLDC GLUCOMTR-MCNC: 257 MG/DL (ref 70–99)
GLUCOSE BLDC GLUCOMTR-MCNC: 92 MG/DL (ref 70–99)
GLUCOSE SERPL-MCNC: 244 MG/DL (ref 70–99)
HCT VFR BLD AUTO: 36 % (ref 35–48)
HGB BLD-MCNC: 11.6 G/DL (ref 12–16)
MAGNESIUM SERPL-MCNC: 1.9 MG/DL (ref 1.8–2.5)
MCH RBC QN AUTO: 30.2 PG (ref 27–32)
MCHC RBC AUTO-ENTMCNC: 32.1 G/DL (ref 32–37)
MCV RBC AUTO: 94.1 FL (ref 80–100)
OSMOLALITY UR CALC.SUM OF ELEC: 318 MOSM/KG (ref 275–295)
PLATELET # BLD AUTO: 126 K/UL (ref 140–400)
PMV BLD AUTO: 9.1 FL (ref 7.4–10.3)
POTASSIUM SERPL-SCNC: 4.5 MMOL/L (ref 3.3–5.1)
RBC # BLD AUTO: 3.83 M/UL (ref 3.7–5.4)
SODIUM SERPL-SCNC: 141 MMOL/L (ref 136–144)
WBC # BLD AUTO: 7.5 K/UL (ref 4–11)

## 2018-07-02 PROCEDURE — 71046 X-RAY EXAM CHEST 2 VIEWS: CPT | Performed by: INTERNAL MEDICINE

## 2018-07-02 PROCEDURE — 93320 DOPPLER ECHO COMPLETE: CPT | Performed by: INTERNAL MEDICINE

## 2018-07-02 PROCEDURE — 99233 SBSQ HOSP IP/OBS HIGH 50: CPT | Performed by: HOSPITALIST

## 2018-07-02 PROCEDURE — 93325 DOPPLER ECHO COLOR FLOW MAPG: CPT | Performed by: INTERNAL MEDICINE

## 2018-07-02 PROCEDURE — B246ZZ4 ULTRASONOGRAPHY OF RIGHT AND LEFT HEART, TRANSESOPHAGEAL: ICD-10-PCS | Performed by: INTERNAL MEDICINE

## 2018-07-02 RX ORDER — ACETAMINOPHEN AND CODEINE PHOSPHATE 300; 30 MG/1; MG/1
1 TABLET ORAL EVERY 6 HOURS PRN
Status: DISCONTINUED | OUTPATIENT
Start: 2018-07-02 | End: 2018-07-03

## 2018-07-02 RX ORDER — MIDAZOLAM HYDROCHLORIDE 1 MG/ML
INJECTION INTRAMUSCULAR; INTRAVENOUS
Status: COMPLETED
Start: 2018-07-02 | End: 2018-07-02

## 2018-07-02 RX ORDER — 0.9 % SODIUM CHLORIDE 0.9 %
VIAL (ML) INJECTION
Status: DISPENSED
Start: 2018-07-02 | End: 2018-07-03

## 2018-07-02 RX ORDER — MIDAZOLAM HYDROCHLORIDE 1 MG/ML
1 INJECTION INTRAMUSCULAR; INTRAVENOUS ONCE
Status: DISCONTINUED | OUTPATIENT
Start: 2018-07-02 | End: 2018-07-02

## 2018-07-02 RX ORDER — SODIUM CHLORIDE 9 MG/ML
INJECTION, SOLUTION INTRAVENOUS
Status: DISPENSED
Start: 2018-07-02 | End: 2018-07-03

## 2018-07-02 NOTE — PROGRESS NOTES
Core Measure Update: EF 25-30%  Currently on Entresto and long acting beta blocker. Not on spironolactone. Please consider adding if clinically appropriate.

## 2018-07-02 NOTE — PROGRESS NOTES
HORACIO with bubble study performed. VSS. Report called to [facility]. Patient awaiting transport back to room.

## 2018-07-02 NOTE — PROGRESS NOTES
[facility]    Progress Note    Lynsey Jacinto Patient Status:  Inpatient    1945 MRN [id number]   Location [facility] 3W/SW Attending Shayan Hatfield MD   Hosp Day # 3 PCP Marika Hendrickson MD       Subjective:     swelling more frequent injections then 70/30, however she does not like to be sticked frequently, f/w Dr. Sosa Perez as outpatient  D/w pt at length, and encouraged healthy diet, regular home meals  She might be able to use just Levemir and oral hypoglycemic with me

## 2018-07-03 LAB
ANION GAP SERPL CALC-SCNC: 8 MMOL/L (ref 0–18)
BUN SERPL-MCNC: 62 MG/DL (ref 8–20)
BUN/CREAT SERPL: 40.8 (ref 10–20)
CALCIUM SERPL-MCNC: 8.9 MG/DL (ref 8.5–10.5)
CHLORIDE SERPL-SCNC: 95 MMOL/L (ref 95–110)
CO2 SERPL-SCNC: 38 MMOL/L (ref 22–32)
CREAT SERPL-MCNC: 1.52 MG/DL (ref 0.5–1.5)
ERYTHROCYTE [DISTWIDTH] IN BLOOD BY AUTOMATED COUNT: 16.2 % (ref 11–15)
GLUCOSE BLDC GLUCOMTR-MCNC: 126 MG/DL (ref 70–99)
GLUCOSE BLDC GLUCOMTR-MCNC: 201 MG/DL (ref 70–99)
GLUCOSE BLDC GLUCOMTR-MCNC: 213 MG/DL (ref 70–99)
GLUCOSE BLDC GLUCOMTR-MCNC: 356 MG/DL (ref 70–99)
GLUCOSE SERPL-MCNC: 192 MG/DL (ref 70–99)
HCT VFR BLD AUTO: 36.3 % (ref 35–48)
HGB BLD-MCNC: 11.9 G/DL (ref 12–16)
MAGNESIUM SERPL-MCNC: 1.8 MG/DL (ref 1.8–2.5)
MCH RBC QN AUTO: 30.3 PG (ref 27–32)
MCHC RBC AUTO-ENTMCNC: 32.7 G/DL (ref 32–37)
MCV RBC AUTO: 92.7 FL (ref 80–100)
OSMOLALITY UR CALC.SUM OF ELEC: 315 MOSM/KG (ref 275–295)
PLATELET # BLD AUTO: 132 K/UL (ref 140–400)
PMV BLD AUTO: 8.8 FL (ref 7.4–10.3)
POTASSIUM SERPL-SCNC: 4.3 MMOL/L (ref 3.3–5.1)
RBC # BLD AUTO: 3.91 M/UL (ref 3.7–5.4)
SODIUM SERPL-SCNC: 141 MMOL/L (ref 136–144)
WBC # BLD AUTO: 8.3 K/UL (ref 4–11)

## 2018-07-03 PROCEDURE — 99233 SBSQ HOSP IP/OBS HIGH 50: CPT | Performed by: HOSPITALIST

## 2018-07-03 RX ORDER — 0.9 % SODIUM CHLORIDE 0.9 %
VIAL (ML) INJECTION
Status: COMPLETED
Start: 2018-07-03 | End: 2018-07-03

## 2018-07-03 RX ORDER — GABAPENTIN 100 MG/1
100 CAPSULE ORAL 3 TIMES DAILY
Status: DISCONTINUED | OUTPATIENT
Start: 2018-07-03 | End: 2018-07-09

## 2018-07-03 RX ORDER — HYDROMORPHONE HYDROCHLORIDE 1 MG/ML
0.3 INJECTION, SOLUTION INTRAMUSCULAR; INTRAVENOUS; SUBCUTANEOUS ONCE
Status: COMPLETED | OUTPATIENT
Start: 2018-07-03 | End: 2018-07-03

## 2018-07-03 RX ORDER — DIAZEPAM 5 MG/ML
2.5 INJECTION, SOLUTION INTRAMUSCULAR; INTRAVENOUS ONCE
Status: DISCONTINUED | OUTPATIENT
Start: 2018-07-03 | End: 2018-07-03

## 2018-07-03 RX ORDER — METHOCARBAMOL 500 MG/1
500 TABLET, FILM COATED ORAL 4 TIMES DAILY
Status: DISCONTINUED | OUTPATIENT
Start: 2018-07-03 | End: 2018-07-08

## 2018-07-03 RX ORDER — MAGNESIUM SULFATE HEPTAHYDRATE 40 MG/ML
2 INJECTION, SOLUTION INTRAVENOUS ONCE
Status: COMPLETED | OUTPATIENT
Start: 2018-07-03 | End: 2018-07-03

## 2018-07-03 RX ORDER — MAGNESIUM OXIDE 400 MG (241.3 MG MAGNESIUM) TABLET
400 TABLET DAILY
Status: DISCONTINUED | OUTPATIENT
Start: 2018-07-03 | End: 2018-07-09

## 2018-07-03 RX ORDER — METHYLPREDNISOLONE SODIUM SUCCINATE 40 MG/ML
40 INJECTION, POWDER, LYOPHILIZED, FOR SOLUTION INTRAMUSCULAR; INTRAVENOUS ONCE
Status: COMPLETED | OUTPATIENT
Start: 2018-07-03 | End: 2018-07-03

## 2018-07-03 RX ORDER — HYDROCODONE BITARTRATE AND ACETAMINOPHEN 7.5; 325 MG/1; MG/1
1 TABLET ORAL EVERY 6 HOURS PRN
Status: DISCONTINUED | OUTPATIENT
Start: 2018-07-03 | End: 2018-07-06

## 2018-07-03 RX ORDER — LIDOCAINE 50 MG/G
1 PATCH TOPICAL EVERY 24 HOURS
Status: DISCONTINUED | OUTPATIENT
Start: 2018-07-03 | End: 2018-07-07

## 2018-07-03 RX ORDER — DIAZEPAM 5 MG/ML
5 INJECTION, SOLUTION INTRAMUSCULAR; INTRAVENOUS ONCE
Status: DISCONTINUED | OUTPATIENT
Start: 2018-07-03 | End: 2018-07-03

## 2018-07-03 RX ORDER — METOLAZONE 5 MG/1
5 TABLET ORAL ONCE
Status: COMPLETED | OUTPATIENT
Start: 2018-07-03 | End: 2018-07-03

## 2018-07-03 RX ORDER — MORPHINE SULFATE 2 MG/ML
1 INJECTION, SOLUTION INTRAMUSCULAR; INTRAVENOUS EVERY 4 HOURS PRN
Status: DISCONTINUED | OUTPATIENT
Start: 2018-07-03 | End: 2018-07-09

## 2018-07-03 NOTE — PROGRESS NOTES
Pt. Complaining of lower back pain. Paged Dr. Kendall Kee. Received orders for lidocaine patch and valium. Will continue to monitor.

## 2018-07-03 NOTE — PROGRESS NOTES
[facility] AND [facility]  [facility]HS/[facility]G Cardiology Progress Note    Tiffanie Obregon Patient Status:  Inpatient    1945 MRN [id number]   Location [facility] 3W/SW Attending Hoda Melgar MD   Hosp Day # 4 PCP Fito Wolff MD     Admitted with shor [facility])    • Retinal tear     OD     Past Surgical History:  2010: CABG  2012: R Salma 99  2012: CARDIAC PACEMAKER PLACEMENT      Comment: replacement of pacemaker and leads  5/14/12: CATARACT EXTRACTION W/  INTRAOCULAR LENS IMPLA* Right

## 2018-07-03 NOTE — PROCEDURES
Cardiology Transesophageal Echo Note    PRE-PROCEDURE DIAGNOSIS: post watchman follow up    PROCEDURE: Transesophageal Echocardiogram.    SEDATION:   Topical spray x 1  Versed: 2 mg  Fentanyl: 50 mcg  I personally supervised the intravenous administration

## 2018-07-03 NOTE — PROGRESS NOTES
[facility]    Progress Note    Lynsey Jacinto Patient Status:  Inpatient    1945 MRN [id number]   Location [facility] 3W/SW Attending Shayan Hatfield MD   Hosp Day # 4 PCP Marika Hendrickson MD       Subjective:     C/o david for a mitral valve clip in the future    CKD3  -Monitor    DM2, A1c~6.5 with frequent hypoglycemic episodes at home  Initially on oral medication for years, then on insulin since heart surgery'2010  With erratic meal schedule, patient would be more suitabl unchanged.  No pneumothorax     Dictated by (CST): Cr Montoya MD on 7/02/2018 at 9:19     Approved by (CST): Cr Montoya MD on 7/02/2018 at 9:38                  Mason Park MD  7/3/2018

## 2018-07-03 NOTE — CM/SW NOTE
Possible need for new home O2. Tentative referral made to Shaina Peace at [facility] ([phone]). Face to face in chart - Need signature and sats documented.   Tamika Nelson RN, CTL

## 2018-07-04 LAB
GLUCOSE BLDC GLUCOMTR-MCNC: 229 MG/DL (ref 70–99)
GLUCOSE BLDC GLUCOMTR-MCNC: 239 MG/DL (ref 70–99)
GLUCOSE BLDC GLUCOMTR-MCNC: 284 MG/DL (ref 70–99)
GLUCOSE BLDC GLUCOMTR-MCNC: 309 MG/DL (ref 70–99)
MAGNESIUM SERPL-MCNC: 2.2 MG/DL (ref 1.8–2.5)

## 2018-07-04 PROCEDURE — 99233 SBSQ HOSP IP/OBS HIGH 50: CPT | Performed by: HOSPITALIST

## 2018-07-04 RX ORDER — BISACODYL 10 MG
10 SUPPOSITORY, RECTAL RECTAL
Status: DISCONTINUED | OUTPATIENT
Start: 2018-07-04 | End: 2018-07-09

## 2018-07-04 RX ORDER — POLYETHYLENE GLYCOL 3350 17 G/17G
17 POWDER, FOR SOLUTION ORAL DAILY
Status: DISCONTINUED | OUTPATIENT
Start: 2018-07-04 | End: 2018-07-09

## 2018-07-04 RX ORDER — METOLAZONE 2.5 MG/1
2.5 TABLET ORAL ONCE
Status: COMPLETED | OUTPATIENT
Start: 2018-07-04 | End: 2018-07-04

## 2018-07-04 NOTE — PHYSICAL THERAPY NOTE
PHYSICAL THERAPY EVALUATION - INPATIENT     Room Number: 346/346-A  Evaluation Date: 7/4/2018  Type of Evaluation: Initial   Physician Order: PT Eval and Treat    Presenting Problem: SOB and sciatica  Reason for Therapy: Mobility Dysfunction and Discharge Potential : Good  Frequency (Obs): 5x/week       PHYSICAL THERAPY MEDICAL/SOCIAL HISTORY       Problem List  Principal Problem:    Chronic systolic heart failure (HCC)  Active Problems:    Shortness of breath    Acute on chronic systolic heart failure ([address] leg) OK now\"     PHYSICAL THERAPY EXAMINATION     OBJECTIVE  Precautions: None  Fall Risk: Standard fall risk    WEIGHT BEARING RESTRICTION  Weight Bearing Restriction: None                PAIN ASSESSMENT  Ratin  Location: posterior B knees  Managemen bathroom tasks with OT. )    Bed Mobility: NT, received sitting EOB     Transfers: CGA- cues for hand placement. Moves slowly. Exercise/Education Provided:  Transfers, ambulation, safety. OT provided toileting/hand hygiene instruction.      Patient End

## 2018-07-04 NOTE — PLAN OF CARE
Problem: Diabetes/Glucose Control  Goal: Glucose maintained within prescribed range  INTERVENTIONS:  - Monitor Blood Glucose as ordered  - Assess for signs and symptoms of hyperglycemia and hypoglycemia  - Administer ordered medications to maintain glucose emergency measures for life threatening arrhythmias  - Monitor electrolytes and administer replacement therapy as ordered   Outcome: Progressing      Problem: RESPIRATORY - ADULT  Goal: Achieves optimal ventilation and oxygenation  INTERVENTIONS:  - Assess patient's stated pain goal  INTERVENTIONS:  - Encourage pt to monitor pain and request assistance  - Assess pain using appropriate pain scale  - Administer analgesics based on type and severity of pain and evaluate response  - Implement non-pharmacological

## 2018-07-04 NOTE — OCCUPATIONAL THERAPY NOTE
OCCUPATIONAL THERAPY EVALUATION - INPATIENT     Room Number: 346/346-A  Evaluation Date: 7/4/2018  Type of Evaluation: Initial  Presenting Problem: Increased SOB    Physician Order: IP Consult to Occupational Therapy  Reason for Therapy: ADL/IADL Dysfuncti heart failure (HCC)  Active Problems:    Shortness of breath    Acute on chronic systolic heart failure [facility])      Past Medical History  Past Medical History:   Diagnosis Date   • Arrhythmia     AICD/Pacer   • Atherosclerosis of coronary artery 2010   • Ba bank.  Pt still drives. SUBJECTIVE  \"I'm doing better today than when I came in a few days ago. \"    OCCUPATIONAL THERAPY EXAMINATION      OBJECTIVE  Precautions:  Other (Comment) (O2 monitoring)  Fall Risk: Standard fall risk    PAIN ASSESSMENT  Rating Dynamic Standing: Fair minus    FUNCTIONAL ADL ASSESSMENT  Grooming: Supervision standing in front of the sink  Feeding: Set-up  Bathing: NA  Toileting: Supervision  Upper Extremity Dressing: SBA  Lower Extremity Dressing: Min A    Education Provided: pr

## 2018-07-04 NOTE — PROGRESS NOTES
[facility]    Cardiology Progress Note    Brittanie Gates Patient Status:  Inpatient    1945 MRN [id number]   Location [facility] 3W/SW Attending Criselda Santacruz MD   [facility] Day # 5 PCP Jeet Monterroso MD     2018  Aziza Elder --    CO2  34*  37*  38*  39*  38*   --    BUN  60*  58*  61*  64*  62*   --    CREATSERUM  1.54*  1.47  1.49  1.64*  1.52*   --    CA  9.2  9.0  8.9  8.7  8.9   --    MG   --   2.1  1.9  1.9  1.8  2.2   GLU  293*  122*  121*  244*  192*   --        No re Intravenous PRN   Normal Saline Flush 0.9 % injection 3 mL 3 mL Intravenous PRN   acetaminophen (TYLENOL) tab 650 mg 650 mg Oral Q6H PRN   ondansetron HCl (ZOFRAN) injection 4 mg 4 mg Intravenous Q6H PRN   dextrose 50 % injection 50 mL 50 mL Intravenous OH 2018 but residual leak. Will continue xarelto and follow up as outpatient for further management.       Will follow. Benoit Gerardo MD  [facility]. Interventional Cardiology.   [phone]

## 2018-07-04 NOTE — PROGRESS NOTES
[facility]    Progress Note    Elvin Cortes Patient Status:  Inpatient    1945 MRN [id number]   Location [facility] 3W/SW Attending Simon Aguilar MD   Hosp Day # 5 PCP Vira Chapa MD       Subjective:     lower ba workup    Afib, V paced, s/p Watchman study  -tele  -s/p HORACIO with persistent residual leak  -Continue anticoagulation    Mitral insufficiency, likely functional  -Monitor for now    CKD3  -Monitor    DM2, A1c~6.5 with frequent hypoglycemic episodes at home

## 2018-07-05 ENCOUNTER — APPOINTMENT (OUTPATIENT)
Dept: CT IMAGING | Facility: HOSPITAL | Age: 73
DRG: 291 | End: 2018-07-05
Attending: HOSPITALIST
Payer: MEDICARE

## 2018-07-05 LAB
ALBUMIN SERPL BCP-MCNC: 3.4 G/DL (ref 3.5–4.8)
ANION GAP SERPL CALC-SCNC: 9 MMOL/L (ref 0–18)
BUN SERPL-MCNC: 94 MG/DL (ref 8–20)
BUN/CREAT SERPL: 51.1 (ref 10–20)
CALCIUM SERPL-MCNC: 8.6 MG/DL (ref 8.5–10.5)
CHLORIDE SERPL-SCNC: 86 MMOL/L (ref 95–110)
CO2 SERPL-SCNC: 39 MMOL/L (ref 22–32)
CREAT SERPL-MCNC: 1.84 MG/DL (ref 0.5–1.5)
GLUCOSE BLDC GLUCOMTR-MCNC: 240 MG/DL (ref 70–99)
GLUCOSE BLDC GLUCOMTR-MCNC: 277 MG/DL (ref 70–99)
GLUCOSE BLDC GLUCOMTR-MCNC: 298 MG/DL (ref 70–99)
GLUCOSE BLDC GLUCOMTR-MCNC: 319 MG/DL (ref 70–99)
GLUCOSE SERPL-MCNC: 294 MG/DL (ref 70–99)
OSMOLALITY UR CALC.SUM OF ELEC: 318 MOSM/KG (ref 275–295)
PHOSPHATE SERPL-MCNC: 4.2 MG/DL (ref 2.4–4.7)
POTASSIUM SERPL-SCNC: 4.1 MMOL/L (ref 3.3–5.1)
SODIUM SERPL-SCNC: 134 MMOL/L (ref 136–144)

## 2018-07-05 PROCEDURE — 72131 CT LUMBAR SPINE W/O DYE: CPT | Performed by: HOSPITALIST

## 2018-07-05 PROCEDURE — 99233 SBSQ HOSP IP/OBS HIGH 50: CPT | Performed by: HOSPITALIST

## 2018-07-05 NOTE — PLAN OF CARE
Problem: Diabetes/Glucose Control  Goal: Glucose maintained within prescribed range  INTERVENTIONS:  - Monitor Blood Glucose as ordered  - Assess for signs and symptoms of hyperglycemia and hypoglycemia  - Administer ordered medications to maintain glucose emergency measures for life threatening arrhythmias  - Monitor electrolytes and administer replacement therapy as ordered   Outcome: Progressing      Problem: RESPIRATORY - ADULT  Goal: Achieves optimal ventilation and oxygenation  INTERVENTIONS:  - Assess

## 2018-07-05 NOTE — PROGRESS NOTES
[facility] HOSP - [facility]    Progress Note    Genesis Buck Patient Status:  Inpatient    1945 MRN [id number]   Location [facility] 3W/SW Attending Yonatan Novak MD   Hosp Day # 6 PCP Naren Villegas MD       Subjective:     lower ba now    CKD3  -Monitor    DM2, A1c~6.5 with frequent hypoglycemic episodes at home  Initially on oral medication for years, then on insulin since heart surgery'2010  With erratic meal schedule, patient would be more suitable for more frequent injections the

## 2018-07-05 NOTE — PROGRESS NOTES
[facility] AND [facility]  MHS/AMG Cardiology Progress Note    Sheng Flores Patient Status:  Inpatient    1945 MRN [id number]   Location [facility] 3W/SW Attending Krys Osorio MD   Hosp Day # 6 PCP Wade Dumont MD     Admitted with shor • Retinal tear     OD     Past Surgical History:  2010: CABG  2012: CARDIAC DEFIBRILLATOR PLACEMENT  2012: CARDIAC PACEMAKER PLACEMENT      Comment: replacement of pacemaker and leads  5/14/12: CATARACT EXTRACTION W/  INTRAOCULAR LENS IMPLA* Right      C

## 2018-07-05 NOTE — PHYSICAL THERAPY NOTE
PHYSICAL THERAPY TREATMENT NOTE - INPATIENT     Room Number: 346/346-A       Presenting Problem: SOB and sciatica    Problem List  Principal Problem:    Chronic systolic heart failure (HCC)  Active Problems:    Shortness of breath    Acute on chronic systo Fair    ACTIVITY TOLERANCE  O2 Saturation at rest 97% and with activity 97%  Liters of O2:  2  No shortness of breath  Blood Pressure: 078//85    AM-PAC '6-Clicks' INPATIENT SHORT FORM - BASIC MOBILITY  How much difficulty does the patient currently have. Mitch Montez demonstrate independence with home activity/exercise instructions provided to patient in preparation for discharge.    Goal #5   Current Status IN PROGRESS   Goal #6    Goal #6  Current Status

## 2018-07-05 NOTE — OCCUPATIONAL THERAPY NOTE
OCCUPATIONAL THERAPY TREATMENT NOTE - INPATIENT        Room Number: 346/346-A           Presenting Problem:  (increased SOB)    Problem List  Principal Problem:    Chronic systolic heart failure (HCC)  Active Problems:    Shortness of breath    Acute on ch which includes using toilet, bedpan or urinal? : A Little  -   Putting on and taking off regular upper body clothing?: None  -   Taking care of personal grooming such as brushing teeth?: None  -   Eating meals?: None    AM-PAC Score:  Score: 21  Approx Deg

## 2018-07-06 LAB
ANION GAP SERPL CALC-SCNC: 11 MMOL/L (ref 0–18)
BASOPHILS # BLD: 0 K/UL (ref 0–0.2)
BASOPHILS NFR BLD: 0 %
BUN SERPL-MCNC: 109 MG/DL (ref 8–20)
BUN/CREAT SERPL: 54.5 (ref 10–20)
CALCIUM SERPL-MCNC: 8.5 MG/DL (ref 8.5–10.5)
CHLORIDE SERPL-SCNC: 84 MMOL/L (ref 95–110)
CO2 SERPL-SCNC: 40 MMOL/L (ref 22–32)
CREAT SERPL-MCNC: 2 MG/DL (ref 0.5–1.5)
EOSINOPHIL # BLD: 0.1 K/UL (ref 0–0.7)
EOSINOPHIL NFR BLD: 2 %
ERYTHROCYTE [DISTWIDTH] IN BLOOD BY AUTOMATED COUNT: 15 % (ref 11–15)
GLUCOSE BLDC GLUCOMTR-MCNC: 146 MG/DL (ref 70–99)
GLUCOSE BLDC GLUCOMTR-MCNC: 182 MG/DL (ref 70–99)
GLUCOSE BLDC GLUCOMTR-MCNC: 206 MG/DL (ref 70–99)
GLUCOSE BLDC GLUCOMTR-MCNC: 220 MG/DL (ref 70–99)
GLUCOSE BLDC GLUCOMTR-MCNC: 258 MG/DL (ref 70–99)
GLUCOSE SERPL-MCNC: 268 MG/DL (ref 70–99)
HCT VFR BLD AUTO: 36.6 % (ref 35–48)
HGB BLD-MCNC: 11.9 G/DL (ref 12–16)
LYMPHOCYTES # BLD: 1.1 K/UL (ref 1–4)
LYMPHOCYTES NFR BLD: 17 %
MAGNESIUM SERPL-MCNC: 2.3 MG/DL (ref 1.8–2.5)
MCH RBC QN AUTO: 30 PG (ref 27–32)
MCHC RBC AUTO-ENTMCNC: 32.4 G/DL (ref 32–37)
MCV RBC AUTO: 92.4 FL (ref 80–100)
MONOCYTES # BLD: 0.7 K/UL (ref 0–1)
MONOCYTES NFR BLD: 11 %
NEUTROPHILS # BLD AUTO: 4.8 K/UL (ref 1.8–7.7)
NEUTROPHILS NFR BLD: 71 %
OSMOLALITY UR CALC.SUM OF ELEC: 324 MOSM/KG (ref 275–295)
PLATELET # BLD AUTO: 173 K/UL (ref 140–400)
PMV BLD AUTO: 9.3 FL (ref 7.4–10.3)
POTASSIUM SERPL-SCNC: 4.4 MMOL/L (ref 3.3–5.1)
RBC # BLD AUTO: 3.96 M/UL (ref 3.7–5.4)
SODIUM SERPL-SCNC: 135 MMOL/L (ref 136–144)
WBC # BLD AUTO: 6.7 K/UL (ref 4–11)

## 2018-07-06 PROCEDURE — 99233 SBSQ HOSP IP/OBS HIGH 50: CPT | Performed by: HOSPITALIST

## 2018-07-06 PROCEDURE — 99222 1ST HOSP IP/OBS MODERATE 55: CPT | Performed by: INTERNAL MEDICINE

## 2018-07-06 RX ORDER — HYDROCODONE BITARTRATE AND ACETAMINOPHEN 5; 325 MG/1; MG/1
1 TABLET ORAL EVERY 4 HOURS PRN
Status: DISCONTINUED | OUTPATIENT
Start: 2018-07-06 | End: 2018-07-09

## 2018-07-06 RX ORDER — SODIUM CHLORIDE 9 MG/ML
INJECTION, SOLUTION INTRAVENOUS
Status: COMPLETED
Start: 2018-07-06 | End: 2018-07-06

## 2018-07-06 RX ORDER — SODIUM CHLORIDE 9 MG/ML
INJECTION, SOLUTION INTRAVENOUS CONTINUOUS
Status: DISCONTINUED | OUTPATIENT
Start: 2018-07-06 | End: 2018-07-08

## 2018-07-06 NOTE — PROGRESS NOTES
[facility] Heart Cardiology Progress Note      Reilly Round Patient Status:  Inpatient    1945 MRN [id number]   Location [facility] 3W/SW Attending Feng Mathur MD   Hosp Day # 7 PCP Delaney Thomas MD clubbing, no cyanosis, 2+ edema to knees bilat  Neuro: no focal deficits  Skin: no rashes or lesions, pacer site well healed.      Scheduled Meds:   • Insulin Aspart Pen  15 Units Subcutaneous TID CC   • PEG 3350  17 g Oral Daily   • lidocaine  1 patch [facility] effusion.    Dictated by (CST): Heather Samano MD on 7/05/2018 at 19:13     Approved by (CST): Heather Samano MD on 7/05/2018 at 19:19                MIRI Nathan  7/6/2018

## 2018-07-06 NOTE — OCCUPATIONAL THERAPY NOTE
OCCUPATIONAL THERAPY TREATMENT NOTE - INPATIENT        Room Number: 346/346-A           Presenting Problem:  (increased SOB)    Problem List  Principal Problem:    Chronic systolic heart failure (HCC)  Active Problems:    Shortness of breath    Acute on ch lower body clothing?: A Little  -   Bathing (including washing, rinsing, drying)?: A Little  -   Toileting, which includes using toilet, bedpan or urinal? : None  -   Putting on and taking off regular upper body clothing?: None  -   Taking care of personal

## 2018-07-06 NOTE — CM/SW NOTE
Case Management/ Progression of Care    Remains overloaded, will be reevlauateed in AM.  Gentle hydration. / to remain available for support and/or discharge planning.          Clarissa Ramirez RN Case Manager

## 2018-07-06 NOTE — PROGRESS NOTES
[facility]    Progress Note    Mau Cowartign Patient Status:  Inpatient    1945 MRN [id number]   Location [facility] 3W/SW Attending Karl Henning MD   Hosp Day # 7 PCP Daron Barbosa MD       Subjective:     lower ba functional  -Monitor for now    CKD3  -Monitor    DM2, A1c~6.5 with frequent hypoglycemic episodes at home  Initially on oral medication for years, then on insulin since heart surgery'2010  With erratic meal schedule, patient would be more suitable for mor Degenerative disc and facet disease throughout the lumbar spine, most prominent at L4-L5, where there is severe narrowing of the canal and bilateral neural foramen.   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

## 2018-07-06 NOTE — CONSULTS
[facility]    Report of Consultation    Brittanie Gates Patient Status:  Inpatient    1945 MRN [id number]   Location [facility] 3W/SW Attending Leatha Roberts MD   Hosp Day # 7 PCP Jeet Monterroso MD     Date of Admission N/A      Comment: Procedure: COLONOSCOPY;  Surgeon: Nic Addison MD;  Location: [address] [place] [address]                ENDOSCOPY  No date: REPAIR RETINAL BREAKS, CRYOTHERAPY - OD - RIG*  Family History   Problem Relation Age of Onset   • Stroke TID CC and HS  •  Atropine Sulfate 0.1 MG/ML injection 0.5 mg, 0.5 mg, Intravenous, PRN  •  nitroGLYCERIN (NITROSTAT) SL tab 0.4 mg, 0.4 mg, Sublingual, Q5 Min PRN  •  Normal Saline Flush 0.9 % injection 10 mL, 10 mL, Intravenous, PRN  •  Normal Saline Flu foot pain     Acute gout due to renal impairment involving multiple sites     Primary osteoarthritis involving multiple joints     Upper GI bleed     GI bleed     Atrial fibrillation (HCC)     Chronic systolic heart failure (HCC)     Shortness of breath

## 2018-07-06 NOTE — CHRONIC PAIN
[facility]  Report of Consultation    Maria D Casillas Patient Status:  Inpatient    1945 MRN [id number]   Location [facility] 3W/SW Attending Simona Lund MD   Hosp Day # 7 PCP Erin Fowler MD     Date of Admission:  6 History:  2010: CABG  2012: R [address]ela 99  2012: CARDIAC PACEMAKER PLACEMENT      Comment: replacement of pacemaker and leads  5/14/12: CATARACT EXTRACTION W/  INTRAOCULAR LENS IMPLA* Right      Comment: Dr. Brennan School at [facility]   6/4/12: C oxide (MAG-OX) tab 400 mg, 400 mg, Oral, Daily  •  insulin detemir (LEVEMIR) 100 UNIT/ML flextouch 17 Units, 17 Units, Subcutaneous, Nightly  •  Sacubitril-Valsartan (ENTRESTO) 24-26 MG per tab 1 tablet, 1 tablet, Oral, BID  •  Insulin Aspart Pen (NOVOLOG) 201 lb (91.2 kg)   SpO2 96%   BMI 36.76 kg/m²   General: Alert and oriented x3, NAD, appears stated age, appropriate disposition and demeanor, answers questions appropriately comfortable in recliner.   Head: normocephalic, atraumatic  Eyes: anicteric; no in hypertension     Hyperlipidemia     Pseudophakia of both eyes     ARMD (age-related macular degeneration), bilateral     Rib pain on right side     Cardiac defibrillator in place     BMI 39.0-39.9,adult     Severe obesity (BMI 35.0-39. 9) with comorbidity ( care of your patient.         Marti Rosenberg  7/6/2018  9:28 AM

## 2018-07-06 NOTE — PHYSICAL THERAPY NOTE
PHYSICAL THERAPY TREATMENT NOTE - INPATIENT     Room Number: 346/346-A       Presenting Problem: SOB and sciatica    Problem List  Principal Problem:    Chronic systolic heart failure (HCC)  Active Problems:    Shortness of breath    Acute on chronic systo sheets and blankets)?: None   -   Sitting down on and standing up from a chair with arms (e.g., wheelchair, bedside commode, etc.): A Little   -   Moving from lying on back to sitting on the side of the bed?: A Little   How much help from another person do

## 2018-07-07 LAB
ANION GAP SERPL CALC-SCNC: 9 MMOL/L (ref 0–18)
BASOPHILS # BLD: 0 K/UL (ref 0–0.2)
BASOPHILS NFR BLD: 0 %
BUN SERPL-MCNC: 112 MG/DL (ref 8–20)
BUN/CREAT SERPL: 63.3 (ref 10–20)
CALCIUM SERPL-MCNC: 8.4 MG/DL (ref 8.5–10.5)
CHLORIDE SERPL-SCNC: 87 MMOL/L (ref 95–110)
CO2 SERPL-SCNC: 37 MMOL/L (ref 22–32)
CREAT SERPL-MCNC: 1.77 MG/DL (ref 0.5–1.5)
EOSINOPHIL # BLD: 0.1 K/UL (ref 0–0.7)
EOSINOPHIL NFR BLD: 1 %
ERYTHROCYTE [DISTWIDTH] IN BLOOD BY AUTOMATED COUNT: 15.5 % (ref 11–15)
GLUCOSE BLDC GLUCOMTR-MCNC: 110 MG/DL (ref 70–99)
GLUCOSE BLDC GLUCOMTR-MCNC: 122 MG/DL (ref 70–99)
GLUCOSE BLDC GLUCOMTR-MCNC: 146 MG/DL (ref 70–99)
GLUCOSE BLDC GLUCOMTR-MCNC: 151 MG/DL (ref 70–99)
GLUCOSE SERPL-MCNC: 149 MG/DL (ref 70–99)
HCT VFR BLD AUTO: 36.3 % (ref 35–48)
HGB BLD-MCNC: 11.7 G/DL (ref 12–16)
LYMPHOCYTES # BLD: 1.1 K/UL (ref 1–4)
LYMPHOCYTES NFR BLD: 16 %
MCH RBC QN AUTO: 29.5 PG (ref 27–32)
MCHC RBC AUTO-ENTMCNC: 32.1 G/DL (ref 32–37)
MCV RBC AUTO: 91.9 FL (ref 80–100)
MONOCYTES # BLD: 0.7 K/UL (ref 0–1)
MONOCYTES NFR BLD: 11 %
NEUTROPHILS # BLD AUTO: 5 K/UL (ref 1.8–7.7)
NEUTROPHILS NFR BLD: 72 %
OSMOLALITY UR CALC.SUM OF ELEC: 314 MOSM/KG (ref 275–295)
PLATELET # BLD AUTO: 172 K/UL (ref 140–400)
PMV BLD AUTO: 9.3 FL (ref 7.4–10.3)
POTASSIUM SERPL-SCNC: 4.3 MMOL/L (ref 3.3–5.1)
RBC # BLD AUTO: 3.95 M/UL (ref 3.7–5.4)
SODIUM SERPL-SCNC: 133 MMOL/L (ref 136–144)
WBC # BLD AUTO: 6.9 K/UL (ref 4–11)

## 2018-07-07 PROCEDURE — 99233 SBSQ HOSP IP/OBS HIGH 50: CPT | Performed by: HOSPITALIST

## 2018-07-07 PROCEDURE — 99232 SBSQ HOSP IP/OBS MODERATE 35: CPT | Performed by: INTERNAL MEDICINE

## 2018-07-07 RX ORDER — FUROSEMIDE 20 MG/1
20 TABLET ORAL DAILY
Status: DISCONTINUED | OUTPATIENT
Start: 2018-07-07 | End: 2018-07-09

## 2018-07-07 RX ORDER — LIDOCAINE 50 MG/G
1 PATCH TOPICAL EVERY 24 HOURS
Status: DISCONTINUED | OUTPATIENT
Start: 2018-07-07 | End: 2018-07-09

## 2018-07-07 NOTE — PROGRESS NOTES
[facility] Heart Cardiology Progress Note      Jake Cooney Patient Status:  Inpatient    1945 MRN [id number]   Location [facility] 3W/SW Attending Vidhya Chen MD   Hosp Day # 8 PCP Chet Burkitt, MD murmur  Abd: Abdomen soft, nontender, nondistended, no organomegaly, bowel sounds present  Ext:  no clubbing, no cyanosis, bilat LE edema up to bila calves  Neuro: no focal deficits  Skin: no rashes or lesions    Scheduled Meds:   • lidocaine  1 patch Frank Maldonado foramen. Transitional L5 vertebral body with a pseudoarthrosis within the right transverse process of L5 and the right sacral ala. Small right pleural effusion.    Dictated by (CST): Dharmesh Vasquez MD on 7/05/2018 at 19:13     Approved by (CST): Dharmesh Vasquez

## 2018-07-07 NOTE — PROGRESS NOTES
[facility]    Progress Note    Alphonse Delgadillo Patient Status:  Inpatient    1945 MRN [id number]   Location [facility] 3W/SW Attending Noah Trejo MD   Hosp Day # 8 PCP Conrado Davis MD       Subjective:     lower ba anticoagulation    Mitral insufficiency, likely functional  -Monitor for now    CKD3  -Monitor    DM2, A1c~6.5 with frequent hypoglycemic episodes at home  Initially on oral medication for years, then on insulin since heart surgery'2010  With erratic meal (cpt=72131)    Result Date: 7/5/2018  CONCLUSION:   Degenerative disc and facet disease throughout the lumbar spine, most prominent at L4-L5, where there is severe narrowing of the canal and bilateral neural foramen.   Transitional L5 vertebral body with a

## 2018-07-07 NOTE — PROGRESS NOTES
[facility] - [facility]    Progress Note    Monalisa Jelena Patient Status:  Inpatient    1945 MRN [id number]   Location [facility] 3W/SW Attending Leonela Howe MD   Hosp Day # 8 PCP Bethel Tamayo MD     Subjective:  Feels better is a 67year old female with Type 2 DM, who is admitted with acute on chronic systolic failure.     Endocrinology is consulted for inpatient DM management.      PLAN;  - Levemir 17 daily  - novolog : increase to 18 with meals along with medium dose CF  - Ac

## 2018-07-08 LAB
ANION GAP SERPL CALC-SCNC: 8 MMOL/L (ref 0–18)
BASOPHILS # BLD: 0 K/UL (ref 0–0.2)
BASOPHILS NFR BLD: 1 %
BUN SERPL-MCNC: 105 MG/DL (ref 8–20)
BUN/CREAT SERPL: 72.9 (ref 10–20)
CALCIUM SERPL-MCNC: 8.8 MG/DL (ref 8.5–10.5)
CHLORIDE SERPL-SCNC: 92 MMOL/L (ref 95–110)
CO2 SERPL-SCNC: 38 MMOL/L (ref 22–32)
CREAT SERPL-MCNC: 1.44 MG/DL (ref 0.5–1.5)
EOSINOPHIL # BLD: 0.1 K/UL (ref 0–0.7)
EOSINOPHIL NFR BLD: 2 %
ERYTHROCYTE [DISTWIDTH] IN BLOOD BY AUTOMATED COUNT: 15.1 % (ref 11–15)
GLUCOSE BLDC GLUCOMTR-MCNC: 121 MG/DL (ref 70–99)
GLUCOSE BLDC GLUCOMTR-MCNC: 138 MG/DL (ref 70–99)
GLUCOSE BLDC GLUCOMTR-MCNC: 220 MG/DL (ref 70–99)
GLUCOSE BLDC GLUCOMTR-MCNC: 237 MG/DL (ref 70–99)
GLUCOSE SERPL-MCNC: 113 MG/DL (ref 70–99)
HCT VFR BLD AUTO: 34.9 % (ref 35–48)
HGB BLD-MCNC: 11.2 G/DL (ref 12–16)
LYMPHOCYTES # BLD: 0.9 K/UL (ref 1–4)
LYMPHOCYTES NFR BLD: 13 %
MCH RBC QN AUTO: 29.4 PG (ref 27–32)
MCHC RBC AUTO-ENTMCNC: 32.2 G/DL (ref 32–37)
MCV RBC AUTO: 91.3 FL (ref 80–100)
MONOCYTES # BLD: 0.7 K/UL (ref 0–1)
MONOCYTES NFR BLD: 11 %
NEUTROPHILS # BLD AUTO: 4.9 K/UL (ref 1.8–7.7)
NEUTROPHILS NFR BLD: 74 %
OSMOLALITY UR CALC.SUM OF ELEC: 320 MOSM/KG (ref 275–295)
PLATELET # BLD AUTO: 143 K/UL (ref 140–400)
PMV BLD AUTO: 8.6 FL (ref 7.4–10.3)
POTASSIUM SERPL-SCNC: 4.8 MMOL/L (ref 3.3–5.1)
RBC # BLD AUTO: 3.82 M/UL (ref 3.7–5.4)
SODIUM SERPL-SCNC: 138 MMOL/L (ref 136–144)
WBC # BLD AUTO: 6.6 K/UL (ref 4–11)

## 2018-07-08 PROCEDURE — 99232 SBSQ HOSP IP/OBS MODERATE 35: CPT | Performed by: INTERNAL MEDICINE

## 2018-07-08 PROCEDURE — 99233 SBSQ HOSP IP/OBS HIGH 50: CPT | Performed by: HOSPITALIST

## 2018-07-08 RX ORDER — METHOCARBAMOL 500 MG/1
500 TABLET, FILM COATED ORAL 3 TIMES DAILY
Status: DISCONTINUED | OUTPATIENT
Start: 2018-07-08 | End: 2018-07-09

## 2018-07-08 RX ORDER — HEPARIN SODIUM 5000 [USP'U]/ML
5000 INJECTION, SOLUTION INTRAVENOUS; SUBCUTANEOUS EVERY 12 HOURS
Status: DISCONTINUED | OUTPATIENT
Start: 2018-07-08 | End: 2018-07-09

## 2018-07-08 NOTE — PROGRESS NOTES
[place] [facility]    Progress Note    Mable Jones Patient Status:  Inpatient    1945 MRN [id number]   Location [facility] 3W/SW Attending Candance Lewis, MD   [facility] Day # 9 PCP Cyndi Mcpherson MD       Subjective:     Feeling bett V paced, s/p Watchman study  - tele  - s/p HORACIO with persistent residual leak  - taken off of Xarelto  - cont aspirin     Mitral insufficiency, likely functional  - Monitor for now     CKD3  -Monitor     DM2, A1c~6.5 with frequent hypoglycemic episodes at h

## 2018-07-08 NOTE — OCCUPATIONAL THERAPY NOTE
OCCUPATIONAL THERAPY TREATMENT NOTE - INPATIENT        Room Number: 346/346-A           Presenting Problem:  (increased SOB)    Problem List  Principal Problem:    Chronic systolic heart failure (HCC)  Active Problems:    Shortness of breath    Acute on ch taking off regular upper body clothing?: None  -   Taking care of personal grooming such as brushing teeth?: None  -   Eating meals?: None    AM-PAC Score:  Score: 22  Approx Degree of Impairment: 25.8%  Standardized Score (AM-PAC Scale): 47.1  CMS Modifie

## 2018-07-08 NOTE — PROGRESS NOTES
[facility]    Progress Note    Rubina Brown Patient Status:  Inpatient    1945 MRN [id number]   Location [facility] 3W/SW Attending Talha Jarvis MD   Hosp Day # 9 PCP Josee Mendoza MD     Subjective:  Feels better [facility])    Patient is a 67year old female with Type 2 DM, who is admitted with acute on chronic systolic failure.     Endocrinology is consulted for inpatient DM management.      PLAN;  - Levemir 17 daily  - [id number] with meals along with medium dose CF  -

## 2018-07-08 NOTE — PROGRESS NOTES
[facility] Cardiology Progress Note      Tiffanie Obregon Patient Status:  Inpatient    1945 MRN [id number]   [facility] 3W/SW Attending Cintia Childers, [address] [facility] Se Day # 9 PCP Fito Wolff MD murmur  Abd: Abdomen soft, nontender, nondistended, no organomegaly, bowel sounds present  Ext:  no clubbing, no cyanosis, improved LE edema, tuibgrip in place.    Neuro: no focal deficits  Skin: no rashes or lesions    Scheduled Meds:   • lidocaine  1 patc above assessment and plan.     Al Eastman MD

## 2018-07-09 ENCOUNTER — HOSPITAL ENCOUNTER (OUTPATIENT)
Dept: INTERVENTIONAL RADIOLOGY/VASCULAR | Facility: HOSPITAL | Age: 73
Discharge: HOME | End: 2018-07-09
Attending: INTERNAL MEDICINE
Payer: MEDICARE

## 2018-07-09 ENCOUNTER — APPOINTMENT (OUTPATIENT)
Dept: CV DIAGNOSTICS | Facility: HOSPITAL | Age: 73
End: 2018-07-09
Attending: INTERNAL MEDICINE
Payer: MEDICARE

## 2018-07-09 VITALS
SYSTOLIC BLOOD PRESSURE: 109 MMHG | HEIGHT: 62 IN | TEMPERATURE: 98 F | OXYGEN SATURATION: 92 % | DIASTOLIC BLOOD PRESSURE: 47 MMHG | RESPIRATION RATE: 20 BRPM | BODY MASS INDEX: 37.48 KG/M2 | WEIGHT: 203.69 LBS | HEART RATE: 70 BPM

## 2018-07-09 LAB
BASOPHILS # BLD: 0 K/UL (ref 0–0.2)
BASOPHILS NFR BLD: 1 %
EOSINOPHIL # BLD: 0.1 K/UL (ref 0–0.7)
EOSINOPHIL NFR BLD: 1 %
ERYTHROCYTE [DISTWIDTH] IN BLOOD BY AUTOMATED COUNT: 14.9 % (ref 11–15)
GLUCOSE BLDC GLUCOMTR-MCNC: 159 MG/DL (ref 70–99)
GLUCOSE BLDC GLUCOMTR-MCNC: 166 MG/DL (ref 70–99)
HCT VFR BLD AUTO: 33.6 % (ref 35–48)
HGB BLD-MCNC: 11 G/DL (ref 12–16)
LYMPHOCYTES # BLD: 0.8 K/UL (ref 1–4)
LYMPHOCYTES NFR BLD: 13 %
MCH RBC QN AUTO: 29.9 PG (ref 27–32)
MCHC RBC AUTO-ENTMCNC: 32.6 G/DL (ref 32–37)
MCV RBC AUTO: 91.6 FL (ref 80–100)
MONOCYTES # BLD: 0.7 K/UL (ref 0–1)
MONOCYTES NFR BLD: 11 %
NEUTROPHILS # BLD AUTO: 4.6 K/UL (ref 1.8–7.7)
NEUTROPHILS NFR BLD: 74 %
PLATELET # BLD AUTO: 152 K/UL (ref 140–400)
PMV BLD AUTO: 9.6 FL (ref 7.4–10.3)
RBC # BLD AUTO: 3.67 M/UL (ref 3.7–5.4)
WBC # BLD AUTO: 6.2 K/UL (ref 4–11)

## 2018-07-09 PROCEDURE — 99239 HOSP IP/OBS DSCHRG MGMT >30: CPT | Performed by: NURSE PRACTITIONER

## 2018-07-09 PROCEDURE — 99232 SBSQ HOSP IP/OBS MODERATE 35: CPT | Performed by: INTERNAL MEDICINE

## 2018-07-09 RX ORDER — GABAPENTIN 100 MG/1
100 CAPSULE ORAL 3 TIMES DAILY
Qty: 90 CAPSULE | Refills: 0 | Status: SHIPPED | OUTPATIENT
Start: 2018-07-09 | End: 2018-01-01

## 2018-07-09 RX ORDER — METHOCARBAMOL 500 MG/1
500 TABLET, FILM COATED ORAL 3 TIMES DAILY
Qty: 90 TABLET | Refills: 0 | Status: SHIPPED | OUTPATIENT
Start: 2018-07-09 | End: 2018-07-16 | Stop reason: ALTCHOICE

## 2018-07-09 RX ORDER — FUROSEMIDE 20 MG/1
20 TABLET ORAL DAILY
Qty: 30 TABLET | Refills: 5 | Status: SHIPPED | OUTPATIENT
Start: 2018-07-10 | End: 2018-07-16

## 2018-07-09 RX ORDER — CARVEDILOL 25 MG/1
25 TABLET ORAL 2 TIMES DAILY WITH MEALS
Qty: 60 TABLET | Refills: 5 | Status: SHIPPED | OUTPATIENT
Start: 2018-07-09 | End: 2018-01-01

## 2018-07-09 RX ORDER — LIDOCAINE 50 MG/G
1 PATCH TOPICAL EVERY 24 HOURS
Qty: 30 PATCH | Refills: 0 | Status: SHIPPED | OUTPATIENT
Start: 2018-07-10 | End: 2018-07-16 | Stop reason: ALTCHOICE

## 2018-07-09 RX ORDER — HYDROCODONE BITARTRATE AND ACETAMINOPHEN 5; 325 MG/1; MG/1
1 TABLET ORAL EVERY 4 HOURS PRN
Qty: 30 TABLET | Refills: 0 | Status: SHIPPED | OUTPATIENT
Start: 2018-07-09 | End: 2018-01-01

## 2018-07-09 NOTE — TRANSITION NOTE
History of Present Illness:   Patient is a 67year old female who was admitted to the hospital for  shortness of breath with acute on chronic systolic heart failure.   This is a pleasant 71-year-old with history of systolic heart failure, chronic atrial f was mild scattered atherosclerotic plaque in the visualized aorta without evidence for mobile atheromata or thrombus. No pericardial effusion.      2) status post watchman device tiny leak according to structural heart team she does not need to be on any f Misc      Use as directed 3 times a day   Quantity:  300 each  Refills:  3     MICROLET LANCETS Misc      TESTING TID   Refills:  0     Pen Needles 31G X 8 MM Misc      1 each by Does not apply route 2 (two) times daily.    Quantity:  200 each  Refills:  1 MG Tabs  · Sacubitril-Valsartan 24-26 MG Tabs

## 2018-07-09 NOTE — PROGRESS NOTES
[facility]    Progress Note    Jeff Sr Patient Status:  Inpatient    1945 MRN [id number]   Location [facility] 3W/SW Attending Srinath Davila MD   Hosp Day # 10 PCP Olivia Mayberry MD     Subjective:  Feels better [facility])    Patient is a 67year old female with Type 2 DM, who is admitted with acute on chronic systolic failure.     Endocrinology is consulted for inpatient DM management.      PLAN;  - Levemir 17 daily  - [id number] with meals along with medium dose CF  -

## 2018-07-09 NOTE — CM/SW NOTE
7/9CM- MD orders received in regards to discharge planning [facility]-Case Management previously noted that a referral was sent to [facility].  This Writer informed Deb Barker ([id number]) [place] [id number] that the orders are in.     -[facility] [id number]

## 2018-07-09 NOTE — PROGRESS NOTES
Core Measure: EF 25-30%  Currently on Entresto and long acting beta blocker. Not on spironolactone secondary to concern for renal function.

## 2018-07-09 NOTE — CM/SW NOTE
07/09/18 1000   CM/SW Screening   Patient's Mental Status Alert;Oriented   Patient's [address]   Number of Levels in Home 2   Patient lives with (A couple who rent patient a portion of the home )   Patient Status Prior to Admission   Indepe

## 2018-07-09 NOTE — DISCHARGE SUMMARY
[facility]    Discharge Summary    Mable Jones Patient Status:  Inpatient    1945 MRN [id number]   Location [facility] 3W/SW Attending Dolores Ferrari MD   Hosp Day # 10 PCP Cyndi Mcpherson MD     Date of Admission:  and chronic atrial fibrillation, who came into the emergency department for evaluation of progressive shortness of breath.   She was seen at the heart failure clinic yesterday for progressive shortness of breath and given a dose of Lasix without significant bleed  - Monitor clinically and hemoglobin, stable  - cont aspirin     Severe lower back pain radiating to her right leg, likely sciatica. Improved today. - reduce robaxin to TID from QID as pt sleepy. Would try to wean to PRN.   - cont Neurontin lidoc total) by mouth 2 (two) times daily with meals.    Quantity:  60 tablet  Refills:  5     Insulin NPH & Regular (70-30) 100 UNIT/ML Supn  Commonly known as:  HUMULIN 70/30 KWIKPEN  What changed:  · how much to take  · additional instructions      Inject 35 U EXTERNALLY TO THE AFFECTED AREA TWICE DAILY AS NEEDED   Quantity:  45 g  Refills:  0     vitamin C 100 MG Tabs      Take 100 mg by mouth daily.    Refills:  0        STOP taking these medications    bumetanide 2 MG Tabs  Commonly known as:  BUMEX        an

## 2018-07-09 NOTE — HOME CARE LIAISON
Met with patient at the bedside. Patient is agreeable to [address] [place] [address]  Brochure and liaison's business card provided with contact information. All questions addressed and answered.

## 2018-07-09 NOTE — PROGRESS NOTES
[facility]    Cardiology Progress Note    Lisha Kin Patient Status:  Inpatient    1945 MRN [id number]   Location [facility] 3W/SW Attending Manuel Hendrickson MD   Hosp Day # 10 PCP Rona Matias MD     Patient Active P 109/47 (BP Location: Right arm)   Pulse 70   Temp 97.9 °F (36.6 °C) (Oral)   Resp 20   Ht 5' 2\" (1.575 m)   Wt 203 lb 11.2 oz (92.4 kg)   SpO2 92%   BMI 37.26 kg/m²     Exam  Gen: No acute distress, alert and oriented   Neck:supple,no JVD  Pulm: Lungs ok, --   294*  268*  149*  113*       Recent Labs   Lab  07/05/18   0628   ALB  3.4*       No results for input(s): TROP in the last 168 hours.     Intake/Output:     Intake/Output Summary (Last 24 hours) at 07/09/18 0957  Last data filed at 07/09/18 0900   Grace

## 2018-07-09 NOTE — DISCHARGE PLANNING
Pt is a/o, denies pain, denies SOB. To be discharged today with home health. Went over heart failure discharge instructions and importance of follow up appointments.  Gave instructions on new insulin & to call Dr. Elisa Thomas office in 2 days, and to call s

## 2018-07-09 NOTE — PROGRESS NOTES
At rest on room air SpO2 96%  With ambulation on room air SpO2 94%  Patient does not require or qualify for home oxygen

## 2018-07-10 ENCOUNTER — TELEPHONE (OUTPATIENT)
Dept: INTERNAL MEDICINE CLINIC | Facility: CLINIC | Age: 73
End: 2018-07-10

## 2018-07-10 ENCOUNTER — PATIENT OUTREACH (OUTPATIENT)
Dept: CASE MANAGEMENT | Age: 73
End: 2018-07-10

## 2018-07-10 ENCOUNTER — TELEPHONE (OUTPATIENT)
Dept: CARDIOLOGY UNIT | Facility: HOSPITAL | Age: 73
End: 2018-07-10

## 2018-07-10 NOTE — TELEPHONE ENCOUNTER
Patient calling frustrated wanting to know what she should do because she has not been able to get [facility] for the last two days. Explained to patient that prior authorization was done for [facility] today and it can take up to 24-72 hours.      Patient wa

## 2018-07-10 NOTE — TELEPHONE ENCOUNTER
Patient recently discharged from Inpatient 7/9/18. Calling in regarding questions with medication changes from her stay.  Reviewed with patient her discharge instructions and medication changes noted from the IP After Visit Summary as well as her recommende

## 2018-07-10 NOTE — PROGRESS NOTES
Initial Post Discharge Follow Up   Discharge Date: 7/9/18  Contact Date: 7/10/2018    Consent Verification:  Assessment Completed With: Patient  HIPAA Verified?   Yes    Discharge Dx:   CHF    General:   • How have you been since your discharge from the 24-26 MG Oral Tab Take 1 tablet by mouth 2 (two) times daily. Disp: 60 tablet Rfl: 5   furosemide 20 MG Oral Tab Take 1 tablet (20 mg total) by mouth daily.  Disp: 30 tablet Rfl: 5   Insulin NPH & Regular (HUMULIN 70/30 KWIKPEN) (70-30) 100 UNIT/ML Subcutan mouth nightly.  ) Disp: 90 tablet Rfl: 1   Insulin Pen Needle (PEN NEEDLES) 31G X 8 MM Does not apply Misc 1 each by Does not apply route 2 (two) times daily.  Disp: 200 each Rfl: 1   MICROLET LANCETS Does not apply Misc TESTING TID Disp:  Rfl: 0   Lancet D meds, disease concerns, Etc): Medication question already being addressed.       Follow up appointments:       Your appointments     Date & Time Appointment Department [facility])    Jul 16, 2018 11:00 AM NEHEMIAHT [address] Aditi Mendiola with [facility] discussed with patient, and orders reviewed and discussed. Any changes or updates to medications and or orders sent to PCP.

## 2018-07-10 NOTE — TELEPHONE ENCOUNTER
Dr ERIC=please see message below and advise, lisinopril was discontinued by LPN  on 4/81/04 under med record, for therapy completion. NO notes/encounter from PCP. Please advise.

## 2018-07-10 NOTE — TELEPHONE ENCOUNTER
PA for Lidocaine 5% patch completed with [employer] via CMM response time 3-5 business days [address]

## 2018-07-10 NOTE — TELEPHONE ENCOUNTER
Received call from pharmacy. They are inquiring about the prescribing DrTaylor for [employer] [place] [employer] and lidocaine patches. Meds were prescribed by two different APNs during the pt's recent hospitalization. [employer] [place] [employer] will be covered but needs a prior authorization.  P

## 2018-07-10 NOTE — TELEPHONE ENCOUNTER
Received call from [facility] from [address] [place] [address] Street who reports she will be faxing over a prior auth form for Lidocaine and Entresto that needs to be completed and faxed back.

## 2018-07-11 ENCOUNTER — PRIOR ORIGINAL RECORDS (OUTPATIENT)
Dept: OTHER | Age: 73
End: 2018-07-11

## 2018-07-11 ENCOUNTER — TELEPHONE (OUTPATIENT)
Dept: CARDIOLOGY CLINIC | Facility: CLINIC | Age: 73
End: 2018-07-11

## 2018-07-11 NOTE — TELEPHONE ENCOUNTER
Spoke with patient and informed of the denial for the Lidocaine patches and approval for Entresto. Discussed with patient doctors recommendation to make an appointment with [facility] within the next 6 months. Patient verbally agrees to recommendations.

## 2018-07-11 NOTE — TELEPHONE ENCOUNTER
PA for Lidocaine patch denied. Patient must have one of the following conditions; pain due to postherpetic neuralgia, pain due to diabetic neuropathy, or neuropathic pain due to cancer.

## 2018-07-11 NOTE — TELEPHONE ENCOUNTER
Pt was seen by  while in the ER, pt asking about rx:Entresto  whom she is not sure who prescribed. Pt looking for a generic for new BP pill, Pls call at:[phone],thanks.

## 2018-07-11 NOTE — TELEPHONE ENCOUNTER
It appears the prior authorization has been completed as stated below. Please contact the patient and relay the message that as stated below. In addition, I would like to see the patient in the office at some point. I have not seen her in over a year.

## 2018-07-11 NOTE — TELEPHONE ENCOUNTER
Detailed message left on Jesus's confidential VM indicating Dr's information below. LMTCB if she has additional questions.

## 2018-07-11 NOTE — TELEPHONE ENCOUNTER
Pt called back stated she can not afford the Rx Entresto  $163/mth asking if he can get a Generic or an alternative med   Please reply to pool: SEBLE Sanchez

## 2018-07-11 NOTE — TELEPHONE ENCOUNTER
PA for Entresto 24-26 mg tab approved effective 4/11/2018-7/10/2019; [address] notified of the approval.

## 2018-07-11 NOTE — TELEPHONE ENCOUNTER
Patient stated has been seeing Lizbeth Siddiqi seen on 5/30/18. Francia please advise. Patient stated she see Soila Chan now.

## 2018-07-11 NOTE — TELEPHONE ENCOUNTER
I have not seen the patient in over a year.   In reviewing the charts, it appears lisinopril was stopped at some point when she was in the hospital.  If her insurance company is further interested, they can contact the cardiology office or the heart failure

## 2018-07-11 NOTE — TELEPHONE ENCOUNTER
Reviewed chart, not seen at this office. Called and confirmed patient seen at AM, patient of Dr. Negin Orta. Called G and spoke to Zuleyma Correa and relayed message from patient.

## 2018-07-11 NOTE — TELEPHONE ENCOUNTER
Patient called and informed with understanding. Phone number given for Cardiology. Patient stated she does not have a cardiologist but see Dr. Devora Jones.       Patient saw Dr. Svitlana Richardson in the hospital who stated he will be her cardiologist.    Routed to card

## 2018-07-11 NOTE — TELEPHONE ENCOUNTER
Patient should contact her cardiologist.  She is on this medication for congestive heart failure.   I am hesitant to make any changes without input from the heart specialist.

## 2018-07-12 ENCOUNTER — PRIOR ORIGINAL RECORDS (OUTPATIENT)
Dept: OTHER | Age: 73
End: 2018-07-12

## 2018-07-12 NOTE — TELEPHONE ENCOUNTER
Patient is seen by [facility] w/ Dr Kristen Zimmerman [phone]. Please call [facility] for future cardiac messages.          Message sent [facility] to [facility]

## 2018-07-12 NOTE — TELEPHONE ENCOUNTER
Please refer back to Dr. Diamond Veronica response as he is the patient's PCP and my collaborating physician. Medication was discontinued during admission in April due to hypotension.

## 2018-07-13 ENCOUNTER — TELEPHONE (OUTPATIENT)
Dept: INTERNAL MEDICINE CLINIC | Facility: CLINIC | Age: 73
End: 2018-07-13

## 2018-07-14 NOTE — TELEPHONE ENCOUNTER
LMTCB=when Layo Diaz calls back, please give her number of Dr Nicole Sullivan [phone] in regards to her question for the medication lisinopril.

## 2018-07-16 ENCOUNTER — OFFICE VISIT (OUTPATIENT)
Dept: INTERNAL MEDICINE CLINIC | Facility: CLINIC | Age: 73
End: 2018-07-16

## 2018-07-16 ENCOUNTER — PRIOR ORIGINAL RECORDS (OUTPATIENT)
Dept: OTHER | Age: 73
End: 2018-07-16

## 2018-07-16 ENCOUNTER — OFFICE VISIT (OUTPATIENT)
Dept: ENDOCRINOLOGY CLINIC | Facility: CLINIC | Age: 73
End: 2018-07-16

## 2018-07-16 ENCOUNTER — OFFICE VISIT (OUTPATIENT)
Dept: CARDIOLOGY CLINIC | Facility: HOSPITAL | Age: 73
End: 2018-07-16
Attending: CLINICAL NURSE SPECIALIST
Payer: MEDICARE

## 2018-07-16 VITALS
WEIGHT: 210 LBS | SYSTOLIC BLOOD PRESSURE: 95 MMHG | HEART RATE: 68 BPM | BODY MASS INDEX: 38.64 KG/M2 | DIASTOLIC BLOOD PRESSURE: 58 MMHG | TEMPERATURE: 98 F | HEIGHT: 62 IN

## 2018-07-16 VITALS
BODY MASS INDEX: 39 KG/M2 | WEIGHT: 213 LBS | HEART RATE: 79 BPM | DIASTOLIC BLOOD PRESSURE: 42 MMHG | OXYGEN SATURATION: 92 % | SYSTOLIC BLOOD PRESSURE: 95 MMHG

## 2018-07-16 VITALS
BODY MASS INDEX: 38.64 KG/M2 | TEMPERATURE: 98 F | SYSTOLIC BLOOD PRESSURE: 102 MMHG | RESPIRATION RATE: 20 BRPM | DIASTOLIC BLOOD PRESSURE: 66 MMHG | WEIGHT: 210 LBS | HEIGHT: 62 IN | HEART RATE: 70 BPM

## 2018-07-16 DIAGNOSIS — Z79.4 TYPE 2 DIABETES MELLITUS WITH DIABETIC PERIPHERAL ANGIOPATHY WITHOUT GANGRENE, WITH LONG-TERM CURRENT USE OF INSULIN (HCC): ICD-10-CM

## 2018-07-16 DIAGNOSIS — I50.9 HEART FAILURE, UNSPECIFIED (HCC): Primary | ICD-10-CM

## 2018-07-16 DIAGNOSIS — E11.8 TYPE 2 DIABETES MELLITUS WITH COMPLICATION, UNSPECIFIED WHETHER LONG TERM INSULIN USE: Primary | ICD-10-CM

## 2018-07-16 DIAGNOSIS — I10 ESSENTIAL HYPERTENSION: ICD-10-CM

## 2018-07-16 DIAGNOSIS — I50.23 ACUTE ON CHRONIC SYSTOLIC HEART FAILURE (HCC): ICD-10-CM

## 2018-07-16 DIAGNOSIS — E78.5 DYSLIPIDEMIA: ICD-10-CM

## 2018-07-16 DIAGNOSIS — I48.91 ATRIAL FIBRILLATION, UNSPECIFIED TYPE (HCC): ICD-10-CM

## 2018-07-16 DIAGNOSIS — I50.22 CHRONIC SYSTOLIC HEART FAILURE (HCC): Primary | ICD-10-CM

## 2018-07-16 DIAGNOSIS — E11.51 TYPE 2 DIABETES MELLITUS WITH DIABETIC PERIPHERAL ANGIOPATHY WITHOUT GANGRENE, WITH LONG-TERM CURRENT USE OF INSULIN (HCC): ICD-10-CM

## 2018-07-16 LAB
ANION GAP SERPL CALC-SCNC: 5 MMOL/L (ref 0–18)
BNP SERPL-MCNC: 604 PG/ML (ref 0–100)
BUN SERPL-MCNC: 33 MG/DL (ref 8–20)
BUN/CREAT SERPL: 24.1 (ref 10–20)
CALCIUM SERPL-MCNC: 8.6 MG/DL (ref 8.5–10.5)
CHLORIDE SERPL-SCNC: 102 MMOL/L (ref 95–110)
CO2 SERPL-SCNC: 33 MMOL/L (ref 22–32)
CREAT SERPL-MCNC: 1.37 MG/DL (ref 0.5–1.5)
GLUCOSE SERPL-MCNC: 122 MG/DL (ref 70–99)
OSMOLALITY UR CALC.SUM OF ELEC: 299 MOSM/KG (ref 275–295)
POTASSIUM SERPL-SCNC: 4.6 MMOL/L (ref 3.3–5.1)
SODIUM SERPL-SCNC: 140 MMOL/L (ref 136–144)

## 2018-07-16 PROCEDURE — 99214 OFFICE O/P EST MOD 30 MIN: CPT | Performed by: PHYSICIAN ASSISTANT

## 2018-07-16 PROCEDURE — G0463 HOSPITAL OUTPT CLINIC VISIT: HCPCS | Performed by: INTERNAL MEDICINE

## 2018-07-16 PROCEDURE — 83880 ASSAY OF NATRIURETIC PEPTIDE: CPT | Performed by: CLINICAL NURSE SPECIALIST

## 2018-07-16 PROCEDURE — 99211 OFF/OP EST MAY X REQ PHY/QHP: CPT | Performed by: CLINICAL NURSE SPECIALIST

## 2018-07-16 PROCEDURE — 99214 OFFICE O/P EST MOD 30 MIN: CPT | Performed by: INTERNAL MEDICINE

## 2018-07-16 PROCEDURE — 36415 COLL VENOUS BLD VENIPUNCTURE: CPT | Performed by: CLINICAL NURSE SPECIALIST

## 2018-07-16 PROCEDURE — 80048 BASIC METABOLIC PNL TOTAL CA: CPT | Performed by: CLINICAL NURSE SPECIALIST

## 2018-07-16 PROCEDURE — 1111F DSCHRG MED/CURRENT MED MERGE: CPT | Performed by: PHYSICIAN ASSISTANT

## 2018-07-16 PROCEDURE — 99214 OFFICE O/P EST MOD 30 MIN: CPT | Performed by: CLINICAL NURSE SPECIALIST

## 2018-07-16 RX ORDER — COLCHICINE 0.6 MG/1
TABLET ORAL
COMMUNITY
End: 2018-07-16 | Stop reason: ALTCHOICE

## 2018-07-16 RX ORDER — CEPHALEXIN 500 MG/1
CAPSULE ORAL
COMMUNITY
End: 2018-07-16 | Stop reason: ALTCHOICE

## 2018-07-16 RX ORDER — METOLAZONE 2.5 MG/1
TABLET ORAL
COMMUNITY
End: 2018-07-16 | Stop reason: ALTCHOICE

## 2018-07-16 RX ORDER — MONTELUKAST SODIUM 10 MG/1
TABLET ORAL
COMMUNITY
End: 2018-07-16

## 2018-07-16 RX ORDER — METHYLPREDNISOLONE 4 MG/1
TABLET ORAL
COMMUNITY
End: 2018-07-16 | Stop reason: ALTCHOICE

## 2018-07-16 RX ORDER — PANTOPRAZOLE SODIUM 40 MG/1
TABLET, DELAYED RELEASE ORAL
COMMUNITY
End: 2018-07-16 | Stop reason: ALTCHOICE

## 2018-07-16 RX ORDER — SIMVASTATIN 40 MG
TABLET ORAL
COMMUNITY
End: 2018-07-16

## 2018-07-16 RX ORDER — LEVOFLOXACIN 250 MG/1
TABLET ORAL
COMMUNITY
End: 2018-07-16 | Stop reason: ALTCHOICE

## 2018-07-16 RX ORDER — FUROSEMIDE 20 MG/1
TABLET ORAL
Status: COMPLETED
Start: 2018-07-16 | End: 2018-07-16

## 2018-07-16 RX ORDER — CARVEDILOL 12.5 MG/1
TABLET ORAL
COMMUNITY
End: 2018-07-16

## 2018-07-16 RX ORDER — FERROUS SULFATE 325(65) MG
TABLET ORAL
COMMUNITY
End: 2018-07-16

## 2018-07-16 RX ORDER — LEVOCETIRIZINE DIHYDROCHLORIDE 5 MG/1
TABLET, FILM COATED ORAL
COMMUNITY
End: 2018-07-16 | Stop reason: ALTCHOICE

## 2018-07-16 RX ORDER — TRAMADOL HYDROCHLORIDE 50 MG/1
TABLET ORAL
COMMUNITY
End: 2018-07-16 | Stop reason: ALTCHOICE

## 2018-07-16 RX ORDER — BUMETANIDE 2 MG/1
TABLET ORAL
COMMUNITY
End: 2018-07-16 | Stop reason: ALTCHOICE

## 2018-07-16 RX ORDER — MOMETASONE FUROATE 1 MG/G
CREAM TOPICAL
COMMUNITY
End: 2018-07-16

## 2018-07-16 RX ORDER — FUROSEMIDE 20 MG/1
40 TABLET ORAL 2 TIMES DAILY
Qty: 30 TABLET | Refills: 5 | COMMUNITY
Start: 2018-07-16 | End: 2018-08-20

## 2018-07-16 RX ORDER — BUMETANIDE 0.5 MG/1
TABLET ORAL
COMMUNITY
End: 2018-07-16 | Stop reason: ALTCHOICE

## 2018-07-16 RX ORDER — DIGOXIN 125 MCG
TABLET ORAL
COMMUNITY
End: 2018-07-16

## 2018-07-16 RX ORDER — LISINOPRIL 2.5 MG/1
TABLET ORAL
COMMUNITY
End: 2018-07-16 | Stop reason: ALTCHOICE

## 2018-07-16 RX ORDER — METOLAZONE 5 MG/1
TABLET ORAL
Status: COMPLETED
Start: 2018-07-16 | End: 2018-07-16

## 2018-07-16 RX ADMIN — FUROSEMIDE 20 MG: 20 TABLET ORAL at 12:10:00

## 2018-07-16 RX ADMIN — METOLAZONE 5 MG: 5 TABLET ORAL at 11:56:00

## 2018-07-16 NOTE — PROGRESS NOTES
[address] [facility] Patient Status:  Outpatient    1945 MRN [id number]   Location MD Dr Efrain Small is a 68year old female who presents to clinic for assessm drawn by MA: BMP stable.  BNP remains elevated 604    BP 95/42   Pulse 79   Wt 213 lb (96.6 kg)   SpO2 92%   BMI 38.96 kg/m²     [facility] 4/24/18 200  D'c weight 203  Clinic weights: 1) 210 /// 2) 213    General appearance: alert, appears stated age and cooperat seen.  -Mild-moderate mitral regurgitation, appears improved compared to previous study.  -Severe tricuspid regurgitation.   Pulmonic valve not seen clearly  -There was a trileaflet aortic valve without stenosis or insufficiency.   -There was no evidence fo stuck a million times. \" Will give po metolazone and an additional po 20 mg furosemide in clinic. Increase furosemide to 40 mg BID and return to CHF clinic next Monday. Unable to afford Entresto.  Patient reports having a prior auth completed, but medic

## 2018-07-16 NOTE — PATIENT INSTRUCTIONS
Please increase furosemide to 40 mg twice daily (2 tabs twice daily)     Begin tracking daily weights. Call with weight gain of 3 lbs overnight or concerning symptoms. [phone]    32-52 oz fluid restriction    Less than 2000 mg sodium/salt diet.  Comm

## 2018-07-16 NOTE — PROGRESS NOTES
HPI:    Brittanie Gates is a 68year old female here today for hospital follow up.    She was discharged from Inpatient hospital, [facility]  to [address] [place] [address] Date: 6/29/2018  Discharge Date: 7/9/2018  Hospital Discharge Diagnosis:    CHF    Interacti her recent watchman procedure which showed reduced LV function with EF 30% and 2 areas of leakage seen around the closure device in the left atrial appendage as well as mild to moderate mitral regurgitation.   She may be a candidate for mitral clip procedur HYDROcodone-acetaminophen 5-325 MG Oral Tab Take 1 tablet by mouth every 4 (four) hours as needed.    MONTELUKAST SODIUM 10 MG Oral Tab TAKE 1 TABLET(10 MG) BY MOUTH EVERY DAY (Patient taking differently: TAKE 1 TABLET(10 MG) BY MOUTH EVERY NIGHT)   Shant Meléndez dysfunction) (2012); No diabetic retinopathy OU (1242,3495); Obesity; Osteoarthritis; Pulmonary emphysema ([facility] [address] and Retinal tear. She  has a past surgical history that includes cabg (2010);  Repair Retinal Breaks, Cryotherapy - OD - Right Eye; Cardiac p Physical Exam   Nursing note and vitals reviewed. Constitutional: She is oriented to person, place, and time. She appears well-developed and well-nourished. No distress. HENT:   Head: Normocephalic and atraumatic.    Right Ear: External ear normal. current use of insulin ([facility] [address]  Diabetes appears to be well controlled with recent hemoglobin A1c 6.5. Continue to work on diet and follow up regularly with endocrinology. No orders of the defined types were placed in this encounter.       Meds & Refill

## 2018-07-16 NOTE — PROGRESS NOTES
Return Office Visit     CHIEF COMPLAINT:    Diabetes , Dyslipidemia    HISTORY OF PRESENT ILLNESS:  Samuel Friday is a 68year old female who presents for follow up for for Diabetes. She was recently hospitalized.        DM HISTORY  Diagnosed around age Rfl: 0   methocarbamol 500 MG Oral Tab Take 1 tablet (500 mg total) by mouth 3 (three) times daily.  Disp: 90 tablet Rfl: 0   MONTELUKAST SODIUM 10 MG Oral Tab TAKE 1 TABLET(10 MG) BY MOUTH EVERY DAY (Patient taking differently: TAKE 1 TABLET(10 MG) BY MOUT Comment:Rash/Itching following surgical prep  Iodine  [Gnp Iodine]    UNKNOWN    PAST MEDICAL, SOCIAL AND FAMILY HISTORY:  See past medical history marked as reviewed. See past surgical history marked as reviewed.   See past family history marked as review bleeding, no rashes and no lesions  EXTREMITIES: normal pulses, no edema      DATA:           A1c 10 % ( 6/2016) --> 9.9 % ( 9/2016) --> 7.8 % ( 3/2017) --> 7.6 % ( 8/2017) --> 8.3 % ( 1/2018) --> 6.5 % ( 7/2018)    ASSESSMENT AND PLAN:    1. Type 2 DM:  Un

## 2018-07-19 ENCOUNTER — TELEPHONE (OUTPATIENT)
Dept: CARDIOLOGY CLINIC | Facility: HOSPITAL | Age: 73
End: 2018-07-19

## 2018-07-19 RX ORDER — METOLAZONE 2.5 MG/1
TABLET ORAL
Qty: 90 TABLET | Refills: 0 | OUTPATIENT
Start: 2018-07-19

## 2018-07-19 RX ORDER — METOLAZONE 2.5 MG/1
2.5 TABLET ORAL SEE ADMIN INSTRUCTIONS
Qty: 30 TABLET | Refills: 0 | Status: SHIPPED | OUTPATIENT
Start: 2018-07-19 | End: 2018-08-20

## 2018-07-19 NOTE — TELEPHONE ENCOUNTER
Larry Bruce, Patient called to inform that she is experiencing leg cramping and feels that she is still retaining fluid in her legs which is causing bilateral leg edema. No complaints of s.o.b. Patient requesting to speak with you.  I informed pt that you will

## 2018-07-19 NOTE — TELEPHONE ENCOUNTER
Patient called reporting leg cramping yesterday. She ate a banana today and feels better. She still have significant lower extremity edema. Weight is down several pounds, now 206 lbs. Will take one dose of metolazone 2.5 mg tomorrow am prior to furosemide.

## 2018-07-23 NOTE — TELEPHONE ENCOUNTER
Spoke with Augustin Andersen, states that this encounter is resolved already, was able to spoke with a different nurse last week and got clarification regarding the medication. Will close the encounter.

## 2018-08-10 ENCOUNTER — TELEPHONE (OUTPATIENT)
Dept: ENDOCRINOLOGY CLINIC | Facility: CLINIC | Age: 73
End: 2018-08-10

## 2018-08-10 LAB
BNP: 604 PMOL/L
BUN: 33 MG/DL
CALCIUM: 8.6 MG/DL
CHLORIDE: 102 MEQ/L
CREATININE, SERUM: 1.37 MG/DL
GLUCOSE: 122 MG/DL
POTASSIUM, SERUM: 4.6 MEQ/L
SODIUM: 140 MEQ/L

## 2018-08-10 NOTE — TELEPHONE ENCOUNTER
Spoke with patient she realized she forgot her insulin at home and did not take breakfast dose. Advised that if she is able to she should ideally return home for insulin.  If unable to she needs to monitor sugars closely and limit carbohydrate intake during

## 2018-08-10 NOTE — TELEPHONE ENCOUNTER
Sent call to RN - Pt states she left insulin at home and wanted to know if she will be ok until she gets home. Pls call. Thank you.

## 2018-08-10 NOTE — TELEPHONE ENCOUNTER
Spoke with Leann. She is now 316 on her meter before lunch. She is planning to go home for lunch so can take insulin. Discussed with AM patient normally taking 70/30 insulin 42 units BID.  Per AM she will take 21 units now with lunch and her normal dose wi

## 2018-08-13 ENCOUNTER — PRIOR ORIGINAL RECORDS (OUTPATIENT)
Dept: OTHER | Age: 73
End: 2018-08-13

## 2018-08-13 ENCOUNTER — MYAURORA ACCOUNT LINK (OUTPATIENT)
Dept: OTHER | Age: 73
End: 2018-08-13

## 2018-08-16 ENCOUNTER — TELEPHONE (OUTPATIENT)
Dept: ENDOCRINOLOGY CLINIC | Facility: CLINIC | Age: 73
End: 2018-08-16

## 2018-08-16 NOTE — TELEPHONE ENCOUNTER
Pt states she is having a hard time getting test strips from pharmacy . States she is completely out of strips.  States she test 3 times a day  please call thank you     Available til 5 at work number [phone]

## 2018-08-16 NOTE — TELEPHONE ENCOUNTER
Contacted pharmacy to follow up. Test strips processed through insurance and available for $9. Called patient and let her know. She will  strips.

## 2018-08-16 NOTE — TELEPHONE ENCOUNTER
Has Medicare so likely needs prescriptions sent with diagnoses and insulin usage. Will send and follow up with pharmacy to make sure can be filled.

## 2018-08-20 ENCOUNTER — OFFICE VISIT (OUTPATIENT)
Dept: CARDIOLOGY CLINIC | Facility: HOSPITAL | Age: 73
End: 2018-08-20
Attending: INTERNAL MEDICINE
Payer: MEDICARE

## 2018-08-20 ENCOUNTER — OFFICE VISIT (OUTPATIENT)
Dept: OTOLARYNGOLOGY | Facility: CLINIC | Age: 73
End: 2018-08-20
Payer: MEDICARE

## 2018-08-20 VITALS
DIASTOLIC BLOOD PRESSURE: 45 MMHG | BODY MASS INDEX: 38 KG/M2 | OXYGEN SATURATION: 92 % | SYSTOLIC BLOOD PRESSURE: 111 MMHG | WEIGHT: 206 LBS | HEART RATE: 70 BPM

## 2018-08-20 VITALS
HEART RATE: 69 BPM | DIASTOLIC BLOOD PRESSURE: 55 MMHG | TEMPERATURE: 97 F | WEIGHT: 206 LBS | BODY MASS INDEX: 37.91 KG/M2 | HEIGHT: 62 IN | SYSTOLIC BLOOD PRESSURE: 94 MMHG

## 2018-08-20 DIAGNOSIS — J01.90 ACUTE SINUSITIS, RECURRENCE NOT SPECIFIED, UNSPECIFIED LOCATION: Primary | ICD-10-CM

## 2018-08-20 DIAGNOSIS — I50.23 ACUTE ON CHRONIC SYSTOLIC HEART FAILURE (HCC): ICD-10-CM

## 2018-08-20 DIAGNOSIS — I50.9 HEART FAILURE, UNSPECIFIED (HCC): Primary | ICD-10-CM

## 2018-08-20 LAB
ANION GAP SERPL CALC-SCNC: 7 MMOL/L (ref 0–18)
BNP SERPL-MCNC: 521 PG/ML (ref 0–100)
BUN SERPL-MCNC: 47 MG/DL (ref 8–20)
BUN/CREAT SERPL: 33.1 (ref 10–20)
CALCIUM SERPL-MCNC: 9 MG/DL (ref 8.5–10.5)
CHLORIDE SERPL-SCNC: 102 MMOL/L (ref 95–110)
CO2 SERPL-SCNC: 27 MMOL/L (ref 22–32)
CREAT SERPL-MCNC: 1.42 MG/DL (ref 0.5–1.5)
GLUCOSE SERPL-MCNC: 217 MG/DL (ref 70–99)
OSMOLALITY UR CALC.SUM OF ELEC: 301 MOSM/KG (ref 275–295)
POTASSIUM SERPL-SCNC: 5 MMOL/L (ref 3.3–5.1)
SODIUM SERPL-SCNC: 136 MMOL/L (ref 136–144)

## 2018-08-20 PROCEDURE — 99203 OFFICE O/P NEW LOW 30 MIN: CPT | Performed by: OTOLARYNGOLOGY

## 2018-08-20 PROCEDURE — 99214 OFFICE O/P EST MOD 30 MIN: CPT | Performed by: CLINICAL NURSE SPECIALIST

## 2018-08-20 PROCEDURE — 80048 BASIC METABOLIC PNL TOTAL CA: CPT | Performed by: CLINICAL NURSE SPECIALIST

## 2018-08-20 PROCEDURE — 36415 COLL VENOUS BLD VENIPUNCTURE: CPT | Performed by: CLINICAL NURSE SPECIALIST

## 2018-08-20 PROCEDURE — 83880 ASSAY OF NATRIURETIC PEPTIDE: CPT | Performed by: CLINICAL NURSE SPECIALIST

## 2018-08-20 PROCEDURE — G0463 HOSPITAL OUTPT CLINIC VISIT: HCPCS | Performed by: OTOLARYNGOLOGY

## 2018-08-20 PROCEDURE — 99211 OFF/OP EST MAY X REQ PHY/QHP: CPT | Performed by: CLINICAL NURSE SPECIALIST

## 2018-08-20 RX ORDER — FUROSEMIDE 20 MG/1
40 TABLET ORAL SEE ADMIN INSTRUCTIONS
Qty: 200 TABLET | Refills: 0 | Status: SHIPPED | OUTPATIENT
Start: 2018-08-20 | End: 2018-08-20

## 2018-08-20 RX ORDER — FUROSEMIDE 20 MG/1
40 TABLET ORAL SEE ADMIN INSTRUCTIONS
Qty: 200 TABLET | Refills: 0 | Status: SHIPPED | OUTPATIENT
Start: 2018-08-20 | End: 2018-01-01

## 2018-08-20 RX ORDER — METOLAZONE 2.5 MG/1
2.5 TABLET ORAL SEE ADMIN INSTRUCTIONS
Qty: 30 TABLET | Refills: 0 | COMMUNITY
Start: 2018-08-20 | End: 2018-01-01

## 2018-08-20 RX ORDER — LISINOPRIL 2.5 MG/1
2.5 TABLET ORAL DAILY
Qty: 30 TABLET | Refills: 0 | Status: ON HOLD | COMMUNITY
Start: 2018-08-20 | End: 2019-01-01

## 2018-08-20 NOTE — PROGRESS NOTES
[address] Patient Status:  Outpatient    1945 MRN [id number]   Location MD Dr Esa Mckay is a 68year old female who presents to clinic for assessm drawn by MA: BMP stable.  BNP trending down    /45   Pulse 70   Wt 206 lb (93.4 kg)   SpO2 92%   BMI 37.68 kg/m²     [facility] 4/24/18 200  D'c weight 203  Clinic weights: 1) 210 /// 2) 213 3) 206    General appearance: alert, appears stated age and coopera areas of leaks seen, one dominant jet of about 0.3 cm in width. No clots seen. -Mild-moderate mitral regurgitation, appears improved compared to previous study.  -Severe tricuspid regurgitation.   Pulmonic valve not seen clearly  -There was a trileaflet a K+ 5.0- has been eating large amount of various fruits/melons, BS >200. Return to clinic in three weeks or sooner if symptoms arise.       Plan:  Continue current medications including furosemide 40 mg daily and metolazone 2.5 mg on Tuesday and Friday    Be

## 2018-08-20 NOTE — PATIENT INSTRUCTIONS
Continue current medications including furosemide 40 mg daily and metolazone 2.5 mg on Tuesday and Friday    Begin tracking daily weights. Call with weight gain of 3 lbs overnight or concerning symptoms.    [phone]    32-52 oz fluid restriction    Less

## 2018-08-20 NOTE — PROGRESS NOTES
Lulu Cohen is a 68year old female. Patient presents with:  Consult: New patient c/o head congestion, stuffy nose and post nasal drip for the past 2-3 months.      HPI:   For the last 2-3 months she has been experiencing problems with facial pressure and mouth as needed. Disp:  Rfl:    Ferrous Sulfate 325 (65 Fe) MG Oral Tab Take 1 tablet (325 mg total) by mouth 2 (two) times daily. Disp: 60 tablet Rfl: 5   aspirin 81 MG Oral Chew Tab Chew 81 mg by mouth daily.    Disp:  Rfl:    Ascorbic Acid (VITAMIN C) 10 5187,4788    OU   • Obesity    • Osteoarthritis    • Pulmonary emphysema (HCC)    • Retinal tear     OD      Social History:  Smoking status: Former Smoker                                                              Packs/day: 0.00      Years: 0.00 location  I suspect sinus congestion and pressure without any evidence of acute infection. With her other concurrent medical problems I will not prescribe any decongestants for her.   I recommended regular use of oral antihistamines and a trial of Afrin fo

## 2018-09-01 RX ORDER — SIMVASTATIN 40 MG
TABLET ORAL
Qty: 90 TABLET | Refills: 0 | Status: SHIPPED | OUTPATIENT
Start: 2018-09-01 | End: 2018-01-01

## 2018-09-01 RX ORDER — SIMVASTATIN 40 MG
TABLET ORAL
Qty: 90 TABLET | Refills: 0 | Status: SHIPPED | OUTPATIENT
Start: 2018-09-01 | End: 2019-01-01

## 2018-09-01 NOTE — TELEPHONE ENCOUNTER
Pt is calling to f/u and states that she will be out of medication by Tuesday. Pt states that Dr. Michael Alvarez is her physician and refills should be through him.  Please advise

## 2018-09-19 NOTE — TELEPHONE ENCOUNTER
Please advise on refill request. Last 7/9/18    Please respond to pool: SEBLE [facility] THE [facility] LPN/[facility]    Refill Protocol Appointment Criteria  · Appointment scheduled in the past 6 months or in the next 3 months  Recent Outpatient Visits            1 month ago Acute s

## 2018-09-19 NOTE — TELEPHONE ENCOUNTER
Per pt, she needs refill on her HYDROcodone-acetaminophen, pt will come and pick in [facility] and pt is out of med.       Current Outpatient Medications:                                                        HYDROcodone-acetaminophen 5-325 MG Oral Tab Take 1 tab

## 2018-09-20 NOTE — TELEPHONE ENCOUNTER
I have never prescribed this med to the patient and feel uncomfortable providing refills as she has been seen by pain doctor recently (Dr. Francy Ridley). Would recommend she follow up with pain clinic for further refills.

## 2018-10-29 NOTE — TELEPHONE ENCOUNTER
Action Requested: Summary for Provider     []  Critical Lab, Recommendations Needed  [] Need Additional Advice  []   FYI    []   Need Orders  [] Need Medications Sent to Pharmacy  []  Other     SUMMARY: appt scheduled for today to see PCP  Onset > 3 weeks

## 2018-10-31 NOTE — TELEPHONE ENCOUNTER
Advised patient on Dr. Dom[facility] information and recommendations. Patient verbalized understanding. Lab order created. She stated that she feels better, pain is improved, she is burping.

## 2018-10-31 NOTE — TELEPHONE ENCOUNTER
Per pt she was instructed by Dr Alon Elliott to notify him if Pantoprazole was helping her symptoms. Pt states med has helped her to feel 90 percent better. Pt also asking for Dr Alon Elliott to review her labs.   Please note- per pt she was not fasting at time

## 2018-10-31 NOTE — TELEPHONE ENCOUNTER
The test results show no evidence of anemia or blood loss. This is good news. The labs also show mild decreased functioning of the kidneys. It is not serious. It may just be related to the pain and being a little bit dehydrated.   We should check a basi

## 2018-11-03 NOTE — PROGRESS NOTES
HPI:    Patient ID: Alphonse Delgadillo is a 68year old female. HPI  Patient is here with abdominal pain. Started last week initially just after she was eating. Now is becoming more frequent. It is an epigastric pain feels like a rock.   Significant recent High cholesterol    • MGD (meibomian gland dysfunction) 2012    OU   • No diabetic retinopathy OU [id number]    OU   • Obesity    • Osteoarthritis    • Pulmonary emphysema (HCC)    • Retinal tear     OD      Past Surgical History:   Procedure Laterality Dm Rfl:    digoxin 0.125 MG Oral Tab Take 0.125 mg by mouth. Disp:  Rfl:    carvedilol 12.5 MG Oral Tab Take 25 mg by mouth 2 (two) times daily.   Disp:  Rfl:    Pantoprazole Sodium 40 MG Oral Tab EC Take 1 tablet (40 mg total) by mouth every morning before br aspirin 81 MG Oral Chew Tab Chew 81 mg by mouth daily. Disp:  Rfl:    Ascorbic Acid (VITAMIN C) 100 MG Oral Tab Take 100 mg by mouth daily.    Disp:  Rfl:    TRIAMCINOLONE ACETONIDE 0.1 % External Cream APPLY EXTERNALLY TO THE AFFECTED AREA TWICE DAILY ASSESSMENT/PLAN:   1. Epigastric pain  Patient with recent onset epigastric pain. Concern given patient's recent episode of GI bleed and anemia. This is a different presentation than last time.   She is completely stable with vital signs and no symptoms

## 2018-11-05 NOTE — TELEPHONE ENCOUNTER
Received a call from patient who reports Dr. George Willett has prescribed her pantoprazole for the acid reflux once a day. She states it does help throughout the day, but states at night the acid reflux always returns.  Patient reports she has been prescribed the

## 2018-11-05 NOTE — TELEPHONE ENCOUNTER
Please call the patient. Okay to increase the dosing to 1 pill twice a day for the next month. Then she should be able to go back down to just once a day.

## 2018-11-05 NOTE — TELEPHONE ENCOUNTER
Patient calling to verify that metolazone was a water pill because she was advised when she called her pharmacy for a refill that is was for her blood pressure. Advised that it is a diuretic which can also help reduce blood pressure.  Patient verbalized und

## 2018-11-27 NOTE — TELEPHONE ENCOUNTER
Patient needs FU appointment  Further refills will be processed at the appointment  Please book  Thanks

## 2018-12-05 NOTE — TELEPHONE ENCOUNTER
appt made -Pt stated fell last week, injury to left leg-knee, pain/swelling, stated looks black, able to walk, taking tylenol #3 for pain, less swelling when she in AM, more pain when turning in bed, requesting Appt in [facility] -closer to home     Reason for Dina Feathers

## 2018-12-05 NOTE — TELEPHONE ENCOUNTER
No Protocol on this med.      Requested Prescriptions     Pending Prescriptions Disp Refills   • [address] 125 MCG Oral Tab [Pharmacy Med Name: DIGOXIN 0.125MG TABLETS (YELLOW)] 90 tablet 0     Sig: TAKE 1 TABLET BY MOUTH EVERY DAY       Last Office Visit with

## 2018-12-05 NOTE — PROGRESS NOTES
HPI:    Patient ID: Samuel Friday is a 68year old female. Knee Pain    The pain is present in the left knee, left ankle and left lower leg. This is a new problem. The current episode started in the past 7 days (6 days).  There has been a history of trau SIMVASTATIN 40 MG Oral Tab TAKE 1 TABLET BY MOUTH EVERY EVENING Disp: 90 tablet Rfl: 0   lisinopril 2.5 MG Oral Tab Take 1 tablet (2.5 mg total) by mouth daily.  Disp: 30 tablet Rfl: 0   Glucose Blood (CONTOUR NEXT TEST) In Vitro Strip 3 each by Other rou daily. 7am and 6pm (Patient taking differently: Inject 40 Units into the skin 2 (two) times daily. 7am and 6pm ) Disp: 1 pen Rfl: 0   HYDROcodone-acetaminophen 5-325 MG Oral Tab Take 1 tablet by mouth every 4 (four) hours as needed.  Disp: 30 tablet Rfl: 0 diaphoretic.               ASSESSMENT/PLAN:   (M79.89) Left leg swelling  (primary encounter diagnosis)  Plan: US VENOUS DOPPLER LEG LEFT - DIAG IMG         (CPT=93971), XR KNEE (1 OR 2 VIEWS), LEFT         (CPT=73560)        We did stat venous doppler whic

## 2018-12-11 PROBLEM — M10.372 ACUTE GOUT DUE TO RENAL IMPAIRMENT INVOLVING LEFT ANKLE: Status: ACTIVE | Noted: 2018-01-22

## 2018-12-11 PROBLEM — R10.11 RUQ ABDOMINAL PAIN: Status: ACTIVE | Noted: 2018-01-01

## 2018-12-11 NOTE — TELEPHONE ENCOUNTER
[facility] staff. Patient was in today to see Dr. Mary Kaye for gout. He prescribed a low dose of prednisone x 1 week. In the office patient felt dizzy. Checked sugar. It was 45.  Provided 1 juice and patient and 6 animal crackers she had brought with he

## 2018-12-11 NOTE — PROGRESS NOTES
HPI:    Patient ID: Genesis Buck is a 68year old female. Ankle Pain    The incident occurred more than 1 week ago. The incident occurred at home (she had fallen on the ground outside her home). The injury mechanism was a fall.  The pain is present in t carvedilol 12.5 MG Oral Tab Take 25 mg by mouth 2 (two) times daily. Disp:  Rfl:    Vitamin C 500 MG Oral Tab Take 500 mg by mouth.  Disp:  Rfl:    Pantoprazole Sodium 40 MG Oral Tab EC Take 1 tablet (40 mg total) by mouth every morning before breakfast (70-30) 100 UNIT/ML Subcutaneous Suspension Pen-injector INJECT 70 UNITS UNDER THE SKIN TWICE DAILY Disp: 120 mL Rfl: 0   metolazone 2.5 MG Oral Tab Take 1 tablet (2.5 mg total) by mouth See Admin Instructions for 8 days.  1 tab thirty min before am furosem tenderness. Abdominal: Soft. Normal appearance and bowel sounds are normal. She exhibits no distension and no mass. There is no tenderness. There is no rigidity, no rebound, no guarding, no tenderness at McBurney's point and negative Valladares's sign.    Shaheen Hdz types were placed in this encounter.       Meds This Visit:  Requested Prescriptions      No prescriptions requested or ordered in this encounter       Imaging & Referrals:  None       WK#[id number]

## 2018-12-11 NOTE — TELEPHONE ENCOUNTER
Pt states she forgot to ask Dr Naomy Velez for refill of norco when she had OV today. Please advise.

## 2018-12-11 NOTE — PROGRESS NOTES
Patient was in today to see Dr. Betty Griffiths for gout. He prescribed a low dose of prednisone x 1 week. In the office patient felt dizzy. Checked sugar. It was 45. Provided 1 juice and patient and 6 animal crackers she had brought with her.  Sugar came up to

## 2018-12-11 NOTE — TELEPHONE ENCOUNTER
Phone call to patient home number. S/W patient. Advised script is ready to p/u. Instructed patient to stop at 2nd flr. Front dest for script. Script at [facility] work station.

## 2018-12-11 NOTE — TELEPHONE ENCOUNTER
Pt states was seen post fall last week by Dr Robert Soni for injured left knee and left ankle. Per pt X rays were done and she was told WNL. Pt states since OV pain on left ankle has increased.   Per pt redness had also increased and even when the bed

## 2018-12-17 NOTE — TELEPHONE ENCOUNTER
Patient was made aware of the results and plan of care from below. Patient voiced understanding and agreed with the plan of care.      Patient also reports she has been taking the prednisone as prescribed and the pain has subsided, but she continues to have

## 2018-12-17 NOTE — TELEPHONE ENCOUNTER
Pt called, message per Dr given.   Verbalized understanding and non compliance  Refuses to see Dr Chaka Beltre

## 2018-12-19 NOTE — TELEPHONE ENCOUNTER
Pt called and asked multiple questions. See below. 1.Pt is asking if she still needs to take Pantoprazole twice daily. Pt states it does help her feel better. 2. Pt also asking for results from 12/11/18 and advised of MD recommendations.   Pt stat

## 2018-12-20 NOTE — TELEPHONE ENCOUNTER
Patient called requesting RN review lab results with her again. Advised patient of Dr. Chad Stone note.  Patient verbalized understanding    Patient states she has a lot of pain in her left foot and it is very difficult for her to get out of the house for

## 2018-12-20 NOTE — TELEPHONE ENCOUNTER
Patient calling to let Dr. Renee Kendall know that her stomach feels much better with taking pantoprazole twice daily. Per patient, when she takes one pantoprazole her stomach hurts.      Patient calling to find out if she should continue the pantoprazole twice

## 2018-12-21 NOTE — TELEPHONE ENCOUNTER
Ok to continue to take med 2 a day. However, the patient should be seen by a GI doctor for further evaluation if she has not seen one in the past for this issue. Please give names and number of  GI doctors.

## 2018-12-22 NOTE — TELEPHONE ENCOUNTER
Action Requested: Summary for Provider     []  Critical Lab, Recommendations Needed  [] Need Additional Advice  []   FYI    []   Need Orders  [] Need Medications Sent to Pharmacy  []  Other     SUMMARY:advised ER eval - Pt was seen 12/11/18-fell-injury to

## 2018-12-22 NOTE — TELEPHONE ENCOUNTER
Noted and agree with ER eval. If her left leg is still red/swollen/painful from the knee to ankle, would need to also rule out more serious causes such as DVT.

## 2018-12-22 NOTE — TELEPHONE ENCOUNTER
Pt advised -stated she feels she's getting better everyday-decline going to ER - advised the Risk if no eval  /Benefits of going to ER

## 2018-12-24 NOTE — TELEPHONE ENCOUNTER
Failed protocol for the Carvedilol-listed under historical.    Refill passed per [facility] protocol.     Refill Protocol Appointment Criteria  · Appointment scheduled in the past 6 months or in the next 3 months  Recent Outpatient Visits

## 2018-12-28 NOTE — TELEPHONE ENCOUNTER
Coreg listed under historical--unable to fill per protocol    Please advise on dose/frequency/refills    Hypertensive Medications  Protocol Criteria:  · Appointment scheduled in the past 6 months or in the next 3 months  · BMP or CMP in the past 12 months

## 2018-12-28 NOTE — TELEPHONE ENCOUNTER
Please call the patient and make sure she is taking this medication in this dosing.   If that is true, okay to refill x1

## 2018-12-28 NOTE — TELEPHONE ENCOUNTER
Spoke with patient ( verified) and states she does take Coreg 12.5 mg 2 tabs every morning and 2 tabs every evening. \"I don't understand why is was listed that way. I have been on this medication like that for years and years. \"    Pharmacy verified

## 2018-12-29 NOTE — TELEPHONE ENCOUNTER
Received a call from patient who wanted to schedule a follow-up appointment with the provider since her left leg is still swollen due to the fall that occurred 3 weeks ago. Also reports having pain around the left ankle; pain level is a 5-6/10.  Patient sta

## 2019-01-01 ENCOUNTER — ANESTHESIA EVENT (OUTPATIENT)
Dept: SURGERY | Facility: HOSPITAL | Age: 74
End: 2019-01-01
Payer: MEDICARE

## 2019-01-01 ENCOUNTER — OFFICE VISIT (OUTPATIENT)
Dept: CARDIOLOGY CLINIC | Facility: HOSPITAL | Age: 74
End: 2019-01-01
Attending: CLINICAL NURSE SPECIALIST
Payer: MEDICARE

## 2019-01-01 ENCOUNTER — HOSPITAL ENCOUNTER (OUTPATIENT)
Dept: ULTRASOUND IMAGING | Facility: HOSPITAL | Age: 74
Discharge: HOME | End: 2019-01-01
Attending: PHYSICIAN ASSISTANT
Payer: MEDICARE

## 2019-01-01 ENCOUNTER — TELEPHONE (OUTPATIENT)
Dept: INTERNAL MEDICINE CLINIC | Facility: CLINIC | Age: 74
End: 2019-01-01

## 2019-01-01 ENCOUNTER — ANCILLARY PROCEDURE (OUTPATIENT)
Dept: CARDIOLOGY | Age: 74
End: 2019-01-01
Attending: INTERNAL MEDICINE

## 2019-01-01 ENCOUNTER — APPOINTMENT (OUTPATIENT)
Dept: GENERAL RADIOLOGY | Facility: HOSPITAL | Age: 74
DRG: 286 | End: 2019-01-01
Attending: INTERNAL MEDICINE
Payer: MEDICARE

## 2019-01-01 ENCOUNTER — TELEPHONE (OUTPATIENT)
Dept: GASTROENTEROLOGY | Facility: CLINIC | Age: 74
End: 2019-01-01

## 2019-01-01 ENCOUNTER — TELEPHONE (OUTPATIENT)
Dept: OTHER | Age: 74
End: 2019-01-01

## 2019-01-01 ENCOUNTER — APPOINTMENT (OUTPATIENT)
Dept: GENERAL RADIOLOGY | Facility: HOSPITAL | Age: 74
DRG: 286 | End: 2019-01-01
Attending: EMERGENCY MEDICINE
Payer: MEDICARE

## 2019-01-01 ENCOUNTER — APPOINTMENT (OUTPATIENT)
Dept: INTERVENTIONAL RADIOLOGY/VASCULAR | Facility: HOSPITAL | Age: 74
DRG: 273 | End: 2019-01-01
Attending: INTERNAL MEDICINE
Payer: MEDICARE

## 2019-01-01 ENCOUNTER — TELEPHONE (OUTPATIENT)
Dept: ENDOCRINOLOGY CLINIC | Facility: CLINIC | Age: 74
End: 2019-01-01

## 2019-01-01 ENCOUNTER — TELEPHONE (OUTPATIENT)
Dept: NEPHROLOGY | Facility: CLINIC | Age: 74
End: 2019-01-01

## 2019-01-01 ENCOUNTER — NURSE TRIAGE (OUTPATIENT)
Dept: OTHER | Age: 74
End: 2019-01-01

## 2019-01-01 ENCOUNTER — OFFICE VISIT (OUTPATIENT)
Dept: CARDIOLOGY CLINIC | Facility: HOSPITAL | Age: 74
End: 2019-01-01
Attending: INTERNAL MEDICINE
Payer: MEDICARE

## 2019-01-01 ENCOUNTER — TELEPHONE (OUTPATIENT)
Dept: CARDIOLOGY | Age: 74
End: 2019-01-01

## 2019-01-01 ENCOUNTER — APPOINTMENT (OUTPATIENT)
Dept: ULTRASOUND IMAGING | Facility: HOSPITAL | Age: 74
DRG: 286 | End: 2019-01-01
Attending: INTERNAL MEDICINE
Payer: MEDICARE

## 2019-01-01 ENCOUNTER — APPOINTMENT (OUTPATIENT)
Dept: GENERAL RADIOLOGY | Facility: HOSPITAL | Age: 74
DRG: 286 | End: 2019-01-01
Attending: CLINICAL NURSE SPECIALIST
Payer: MEDICARE

## 2019-01-01 ENCOUNTER — ANCILLARY ORDERS (OUTPATIENT)
Dept: CARDIOLOGY | Age: 74
End: 2019-01-01

## 2019-01-01 ENCOUNTER — HOSPITAL ENCOUNTER (INPATIENT)
Facility: HOSPITAL | Age: 74
LOS: 1 days | DRG: 291 | End: 2019-01-01
Attending: HOSPITALIST | Admitting: HOSPITALIST
Payer: OTHER MISCELLANEOUS

## 2019-01-01 ENCOUNTER — TELEPHONE (OUTPATIENT)
Dept: INTERNAL MEDICINE UNIT | Facility: HOSPITAL | Age: 74
End: 2019-01-01

## 2019-01-01 ENCOUNTER — HOSPITAL ENCOUNTER (INPATIENT)
Facility: HOSPITAL | Age: 74
LOS: 22 days | Discharge: NURSING, REHAB OR LONG-TERM CARE | DRG: 286 | End: 2019-01-01
Attending: EMERGENCY MEDICINE | Admitting: HOSPITALIST
Payer: MEDICARE

## 2019-01-01 ENCOUNTER — LAB ENCOUNTER (OUTPATIENT)
Dept: LAB | Age: 74
End: 2019-01-01
Attending: INTERNAL MEDICINE
Payer: MEDICARE

## 2019-01-01 ENCOUNTER — APPOINTMENT (OUTPATIENT)
Dept: CV DIAGNOSTICS | Facility: HOSPITAL | Age: 74
DRG: 291 | End: 2019-01-01
Attending: INTERNAL MEDICINE
Payer: MEDICARE

## 2019-01-01 ENCOUNTER — OFFICE VISIT (OUTPATIENT)
Dept: OTOLARYNGOLOGY | Facility: CLINIC | Age: 74
End: 2019-01-01
Payer: MEDICARE

## 2019-01-01 ENCOUNTER — HOSPITAL ENCOUNTER (OUTPATIENT)
Dept: INTERVENTIONAL RADIOLOGY/VASCULAR | Facility: HOSPITAL | Age: 74
Discharge: HOME | End: 2019-01-01
Attending: INTERNAL MEDICINE | Admitting: INTERNAL MEDICINE
Payer: MEDICARE

## 2019-01-01 ENCOUNTER — APPOINTMENT (OUTPATIENT)
Dept: CV DIAGNOSTICS | Facility: HOSPITAL | Age: 74
DRG: 286 | End: 2019-01-01
Attending: INTERNAL MEDICINE
Payer: MEDICARE

## 2019-01-01 ENCOUNTER — OFFICE VISIT (OUTPATIENT)
Dept: INTERNAL MEDICINE CLINIC | Facility: CLINIC | Age: 74
End: 2019-01-01
Payer: MEDICARE

## 2019-01-01 ENCOUNTER — APPOINTMENT (OUTPATIENT)
Dept: INTERVENTIONAL RADIOLOGY/VASCULAR | Facility: HOSPITAL | Age: 74
DRG: 286 | End: 2019-01-01
Attending: INTERNAL MEDICINE
Payer: MEDICARE

## 2019-01-01 ENCOUNTER — APPOINTMENT (OUTPATIENT)
Dept: PICC SERVICES | Facility: HOSPITAL | Age: 74
DRG: 291 | End: 2019-01-01
Attending: INTERNAL MEDICINE
Payer: MEDICARE

## 2019-01-01 ENCOUNTER — HOSPITAL ENCOUNTER (OUTPATIENT)
Facility: HOSPITAL | Age: 74
Setting detail: HOSPITAL OUTPATIENT SURGERY
Discharge: HOME | End: 2019-01-01
Attending: SURGERY | Admitting: SURGERY
Payer: MEDICARE

## 2019-01-01 ENCOUNTER — APPOINTMENT (OUTPATIENT)
Dept: CARDIOLOGY | Age: 74
End: 2019-01-01
Attending: INTERNAL MEDICINE

## 2019-01-01 ENCOUNTER — CLINICAL ABSTRACT (OUTPATIENT)
Dept: CARDIOLOGY | Age: 74
End: 2019-01-01

## 2019-01-01 ENCOUNTER — TELEPHONE (OUTPATIENT)
Dept: INTERVENTIONAL RADIOLOGY/VASCULAR | Facility: HOSPITAL | Age: 74
End: 2019-01-01

## 2019-01-01 ENCOUNTER — OFFICE VISIT (OUTPATIENT)
Dept: ORTHOPEDICS CLINIC | Facility: CLINIC | Age: 74
End: 2019-01-01
Payer: MEDICARE

## 2019-01-01 ENCOUNTER — HOSPITAL ENCOUNTER (INPATIENT)
Facility: HOSPITAL | Age: 74
LOS: 6 days | Discharge: HOSPICE | DRG: 291 | End: 2019-01-01
Attending: EMERGENCY MEDICINE | Admitting: HOSPITALIST
Payer: MEDICARE

## 2019-01-01 ENCOUNTER — APPOINTMENT (OUTPATIENT)
Dept: ULTRASOUND IMAGING | Facility: HOSPITAL | Age: 74
DRG: 286 | End: 2019-01-01
Attending: CLINICAL NURSE SPECIALIST
Payer: MEDICARE

## 2019-01-01 ENCOUNTER — PRIOR ORIGINAL RECORDS (OUTPATIENT)
Dept: OTHER | Age: 74
End: 2019-01-01

## 2019-01-01 ENCOUNTER — HOSPITAL ENCOUNTER (INPATIENT)
Facility: HOSPITAL | Age: 74
LOS: 4 days | Discharge: NURSING, REHAB OR LONG-TERM CARE | DRG: 273 | End: 2019-01-01
Attending: EMERGENCY MEDICINE | Admitting: HOSPITALIST
Payer: MEDICARE

## 2019-01-01 ENCOUNTER — ANESTHESIA (OUTPATIENT)
Dept: SURGERY | Facility: HOSPITAL | Age: 74
End: 2019-01-01
Payer: MEDICARE

## 2019-01-01 ENCOUNTER — HOSPITAL ENCOUNTER (OUTPATIENT)
Dept: GENERAL RADIOLOGY | Age: 74
Discharge: HOME | End: 2019-01-01
Attending: INTERNAL MEDICINE
Payer: MEDICARE

## 2019-01-01 ENCOUNTER — HOSPITAL ENCOUNTER (OUTPATIENT)
Dept: INTERVENTIONAL RADIOLOGY/VASCULAR | Facility: HOSPITAL | Age: 74
Discharge: HOME | End: 2019-01-01
Attending: SURGERY | Admitting: SURGERY
Payer: MEDICARE

## 2019-01-01 ENCOUNTER — APPOINTMENT (OUTPATIENT)
Dept: GENERAL RADIOLOGY | Facility: HOSPITAL | Age: 74
DRG: 273 | End: 2019-01-01
Attending: EMERGENCY MEDICINE
Payer: MEDICARE

## 2019-01-01 ENCOUNTER — PATIENT OUTREACH (OUTPATIENT)
Dept: CASE MANAGEMENT | Age: 74
End: 2019-01-01

## 2019-01-01 ENCOUNTER — OFFICE VISIT (OUTPATIENT)
Dept: SURGERY | Facility: CLINIC | Age: 74
End: 2019-01-01
Payer: MEDICARE

## 2019-01-01 ENCOUNTER — APPOINTMENT (OUTPATIENT)
Dept: GENERAL RADIOLOGY | Facility: HOSPITAL | Age: 74
DRG: 291 | End: 2019-01-01
Attending: EMERGENCY MEDICINE
Payer: MEDICARE

## 2019-01-01 VITALS
TEMPERATURE: 98 F | DIASTOLIC BLOOD PRESSURE: 12 MMHG | OXYGEN SATURATION: 90 % | HEART RATE: 83 BPM | SYSTOLIC BLOOD PRESSURE: 87 MMHG | RESPIRATION RATE: 20 BRPM

## 2019-01-01 VITALS
HEART RATE: 67 BPM | RESPIRATION RATE: 16 BRPM | WEIGHT: 216 LBS | HEIGHT: 61 IN | BODY MASS INDEX: 40.78 KG/M2 | DIASTOLIC BLOOD PRESSURE: 60 MMHG | SYSTOLIC BLOOD PRESSURE: 122 MMHG

## 2019-01-01 VITALS
SYSTOLIC BLOOD PRESSURE: 110 MMHG | DIASTOLIC BLOOD PRESSURE: 60 MMHG | HEART RATE: 70 BPM | OXYGEN SATURATION: 95 % | BODY MASS INDEX: 37.76 KG/M2 | HEIGHT: 61 IN | WEIGHT: 200 LBS

## 2019-01-01 VITALS
HEIGHT: 61 IN | HEART RATE: 107 BPM | SYSTOLIC BLOOD PRESSURE: 122 MMHG | BODY MASS INDEX: 43.61 KG/M2 | WEIGHT: 231 LBS | DIASTOLIC BLOOD PRESSURE: 60 MMHG | OXYGEN SATURATION: 93 %

## 2019-01-01 VITALS
WEIGHT: 164 LBS | OXYGEN SATURATION: 96 % | HEIGHT: 61 IN | HEART RATE: 80 BPM | DIASTOLIC BLOOD PRESSURE: 60 MMHG | SYSTOLIC BLOOD PRESSURE: 94 MMHG | BODY MASS INDEX: 30.96 KG/M2

## 2019-01-01 VITALS
BODY MASS INDEX: 28 KG/M2 | SYSTOLIC BLOOD PRESSURE: 92 MMHG | WEIGHT: 146 LBS | RESPIRATION RATE: 28 BRPM | HEART RATE: 84 BPM | OXYGEN SATURATION: 94 % | DIASTOLIC BLOOD PRESSURE: 60 MMHG

## 2019-01-01 VITALS
HEIGHT: 61 IN | BODY MASS INDEX: 37.76 KG/M2 | DIASTOLIC BLOOD PRESSURE: 58 MMHG | HEART RATE: 70 BPM | WEIGHT: 200 LBS | OXYGEN SATURATION: 97 % | SYSTOLIC BLOOD PRESSURE: 112 MMHG

## 2019-01-01 VITALS
SYSTOLIC BLOOD PRESSURE: 111 MMHG | HEIGHT: 64 IN | TEMPERATURE: 97 F | DIASTOLIC BLOOD PRESSURE: 67 MMHG | BODY MASS INDEX: 26.8 KG/M2 | WEIGHT: 157 LBS | HEART RATE: 82 BPM

## 2019-01-01 VITALS
HEIGHT: 62 IN | BODY MASS INDEX: 39.93 KG/M2 | OXYGEN SATURATION: 96 % | SYSTOLIC BLOOD PRESSURE: 81 MMHG | RESPIRATION RATE: 18 BRPM | HEART RATE: 80 BPM | WEIGHT: 217 LBS | TEMPERATURE: 98 F | DIASTOLIC BLOOD PRESSURE: 53 MMHG

## 2019-01-01 VITALS
HEART RATE: 71 BPM | WEIGHT: 207.63 LBS | BODY MASS INDEX: 38 KG/M2 | SYSTOLIC BLOOD PRESSURE: 106 MMHG | DIASTOLIC BLOOD PRESSURE: 63 MMHG | OXYGEN SATURATION: 96 %

## 2019-01-01 VITALS
BODY MASS INDEX: 39.65 KG/M2 | HEART RATE: 72 BPM | WEIGHT: 210 LBS | SYSTOLIC BLOOD PRESSURE: 110 MMHG | HEIGHT: 61 IN | RESPIRATION RATE: 16 BRPM | DIASTOLIC BLOOD PRESSURE: 58 MMHG

## 2019-01-01 VITALS
OXYGEN SATURATION: 97 % | HEIGHT: 61 IN | WEIGHT: 151.31 LBS | RESPIRATION RATE: 20 BRPM | TEMPERATURE: 98 F | DIASTOLIC BLOOD PRESSURE: 30 MMHG | HEART RATE: 75 BPM | SYSTOLIC BLOOD PRESSURE: 84 MMHG | BODY MASS INDEX: 28.57 KG/M2

## 2019-01-01 VITALS
OXYGEN SATURATION: 98 % | BODY MASS INDEX: 40.06 KG/M2 | TEMPERATURE: 98 F | SYSTOLIC BLOOD PRESSURE: 95 MMHG | WEIGHT: 217.69 LBS | HEIGHT: 62 IN | DIASTOLIC BLOOD PRESSURE: 56 MMHG | HEART RATE: 89 BPM | RESPIRATION RATE: 18 BRPM

## 2019-01-01 VITALS
BODY MASS INDEX: 42.67 KG/M2 | HEIGHT: 61 IN | HEART RATE: 60 BPM | DIASTOLIC BLOOD PRESSURE: 54 MMHG | WEIGHT: 226 LBS | SYSTOLIC BLOOD PRESSURE: 102 MMHG

## 2019-01-01 VITALS
RESPIRATION RATE: 16 BRPM | DIASTOLIC BLOOD PRESSURE: 109 MMHG | WEIGHT: 157 LBS | BODY MASS INDEX: 30 KG/M2 | SYSTOLIC BLOOD PRESSURE: 132 MMHG | HEART RATE: 87 BPM | OXYGEN SATURATION: 95 %

## 2019-01-01 VITALS
RESPIRATION RATE: 18 BRPM | DIASTOLIC BLOOD PRESSURE: 57 MMHG | HEIGHT: 61 IN | WEIGHT: 165 LBS | BODY MASS INDEX: 31.15 KG/M2 | SYSTOLIC BLOOD PRESSURE: 93 MMHG | HEART RATE: 82 BPM

## 2019-01-01 VITALS
HEIGHT: 64 IN | WEIGHT: 157.38 LBS | SYSTOLIC BLOOD PRESSURE: 102 MMHG | TEMPERATURE: 98 F | DIASTOLIC BLOOD PRESSURE: 62 MMHG | BODY MASS INDEX: 26.87 KG/M2 | HEART RATE: 82 BPM

## 2019-01-01 VITALS — OXYGEN SATURATION: 100 % | DIASTOLIC BLOOD PRESSURE: 60 MMHG | SYSTOLIC BLOOD PRESSURE: 102 MMHG | HEART RATE: 116 BPM

## 2019-01-01 VITALS
BODY MASS INDEX: 30.99 KG/M2 | DIASTOLIC BLOOD PRESSURE: 70 MMHG | WEIGHT: 164 LBS | HEART RATE: 80 BPM | SYSTOLIC BLOOD PRESSURE: 100 MMHG | RESPIRATION RATE: 16 BRPM

## 2019-01-01 VITALS
RESPIRATION RATE: 18 BRPM | HEART RATE: 82 BPM | DIASTOLIC BLOOD PRESSURE: 50 MMHG | OXYGEN SATURATION: 95 % | BODY MASS INDEX: 29.17 KG/M2 | SYSTOLIC BLOOD PRESSURE: 95 MMHG | HEIGHT: 62 IN | WEIGHT: 158.5 LBS | TEMPERATURE: 98 F

## 2019-01-01 VITALS
DIASTOLIC BLOOD PRESSURE: 61 MMHG | HEART RATE: 69 BPM | WEIGHT: 224 LBS | BODY MASS INDEX: 38.24 KG/M2 | HEIGHT: 64 IN | SYSTOLIC BLOOD PRESSURE: 98 MMHG

## 2019-01-01 VITALS — BODY MASS INDEX: 36.54 KG/M2 | HEIGHT: 64 IN | WEIGHT: 214 LBS

## 2019-01-01 VITALS
SYSTOLIC BLOOD PRESSURE: 90 MMHG | HEIGHT: 64 IN | WEIGHT: 214 LBS | HEART RATE: 70 BPM | DIASTOLIC BLOOD PRESSURE: 58 MMHG | BODY MASS INDEX: 36.54 KG/M2

## 2019-01-01 DIAGNOSIS — I42.9 CARDIOMYOPATHY, UNSPECIFIED TYPE  (CMD): ICD-10-CM

## 2019-01-01 DIAGNOSIS — S32.000D COMPRESSION FRACTURE OF LUMBAR VERTEBRA WITH ROUTINE HEALING, UNSPECIFIED LUMBAR VERTEBRAL LEVEL, SUBSEQUENT ENCOUNTER: Primary | ICD-10-CM

## 2019-01-01 DIAGNOSIS — I48.91 ATRIAL FIBRILLATION, UNSPECIFIED TYPE (HCC): ICD-10-CM

## 2019-01-01 DIAGNOSIS — Z02.9 ENCOUNTERS FOR ADMINISTRATIVE PURPOSE: ICD-10-CM

## 2019-01-01 DIAGNOSIS — I48.20 CHRONIC ATRIAL FIBRILLATION (HCC): ICD-10-CM

## 2019-01-01 DIAGNOSIS — S80.12XA CONTUSION OF LEFT LOWER EXTREMITY, INITIAL ENCOUNTER: Primary | ICD-10-CM

## 2019-01-01 DIAGNOSIS — R68.89 COLD INTOLERANCE: Primary | ICD-10-CM

## 2019-01-01 DIAGNOSIS — R10.9 RIGHT FLANK PAIN: Primary | ICD-10-CM

## 2019-01-01 DIAGNOSIS — K80.20 CALCULUS OF GALLBLADDER WITHOUT CHOLECYSTITIS WITHOUT OBSTRUCTION: Primary | ICD-10-CM

## 2019-01-01 DIAGNOSIS — Z95.810 ICD (IMPLANTABLE CARDIOVERTER-DEFIBRILLATOR) IN PLACE: ICD-10-CM

## 2019-01-01 DIAGNOSIS — I50.9 CHF (CONGESTIVE HEART FAILURE) (HCC): ICD-10-CM

## 2019-01-01 DIAGNOSIS — E87.5 HYPERKALEMIA: Primary | ICD-10-CM

## 2019-01-01 DIAGNOSIS — T78.40XA ALLERGIC REACTION, INITIAL ENCOUNTER: Primary | ICD-10-CM

## 2019-01-01 DIAGNOSIS — N19 RENAL FAILURE: ICD-10-CM

## 2019-01-01 DIAGNOSIS — R10.11 RIGHT UPPER QUADRANT PAIN: ICD-10-CM

## 2019-01-01 DIAGNOSIS — R21 RASH: ICD-10-CM

## 2019-01-01 DIAGNOSIS — R14.0 BLOATING: ICD-10-CM

## 2019-01-01 DIAGNOSIS — I42.9 CARDIOMYOPATHY, UNSPECIFIED TYPE (CMD): ICD-10-CM

## 2019-01-01 DIAGNOSIS — N18.6 ESRD ON HEMODIALYSIS (HCC): ICD-10-CM

## 2019-01-01 DIAGNOSIS — M25.562 PAIN AND SWELLING OF LEFT KNEE: ICD-10-CM

## 2019-01-01 DIAGNOSIS — I42.9 PRIMARY CARDIOMYOPATHY (HCC): ICD-10-CM

## 2019-01-01 DIAGNOSIS — R04.0 EPISTAXIS: Primary | ICD-10-CM

## 2019-01-01 DIAGNOSIS — I10 ESSENTIAL HYPERTENSION: Primary | ICD-10-CM

## 2019-01-01 DIAGNOSIS — K82.8 GALLBLADDER SLUDGE: Primary | ICD-10-CM

## 2019-01-01 DIAGNOSIS — G89.29 CHRONIC MIDLINE LOW BACK PAIN WITHOUT SCIATICA: ICD-10-CM

## 2019-01-01 DIAGNOSIS — R18.8 ASCITES: ICD-10-CM

## 2019-01-01 DIAGNOSIS — S81.802A MULTIPLE OPEN WOUNDS OF LOWER LEG, LEFT, INITIAL ENCOUNTER: Primary | ICD-10-CM

## 2019-01-01 DIAGNOSIS — N18.4 CKD (CHRONIC KIDNEY DISEASE) STAGE 4, GFR 15-29 ML/MIN (HCC): ICD-10-CM

## 2019-01-01 DIAGNOSIS — Z01.810 PRE-OPERATIVE CARDIOVASCULAR EXAMINATION: ICD-10-CM

## 2019-01-01 DIAGNOSIS — Z45.018 PACEMAKER REPROGRAMMING/CHECK: ICD-10-CM

## 2019-01-01 DIAGNOSIS — M25.462 PAIN AND SWELLING OF LEFT KNEE: ICD-10-CM

## 2019-01-01 DIAGNOSIS — N18.6 ESRD (END STAGE RENAL DISEASE) ON DIALYSIS (HCC): ICD-10-CM

## 2019-01-01 DIAGNOSIS — K42.9 UMBILICAL HERNIA WITHOUT OBSTRUCTION AND WITHOUT GANGRENE: ICD-10-CM

## 2019-01-01 DIAGNOSIS — I50.23 ACUTE ON CHRONIC SYSTOLIC HEART FAILURE (HCC): Primary | ICD-10-CM

## 2019-01-01 DIAGNOSIS — Z99.2 ESRD (END STAGE RENAL DISEASE) ON DIALYSIS (HCC): ICD-10-CM

## 2019-01-01 DIAGNOSIS — T14.8XXA MULTIPLE WOUNDS OF SKIN: ICD-10-CM

## 2019-01-01 DIAGNOSIS — I25.10 ATHEROSCLEROSIS OF NATIVE CORONARY ARTERY OF NATIVE HEART WITHOUT ANGINA PECTORIS: ICD-10-CM

## 2019-01-01 DIAGNOSIS — R06.01 ORTHOPNEA: ICD-10-CM

## 2019-01-01 DIAGNOSIS — I50.9 ACUTE ON CHRONIC CONGESTIVE HEART FAILURE, UNSPECIFIED HEART FAILURE TYPE (HCC): ICD-10-CM

## 2019-01-01 DIAGNOSIS — I10 ESSENTIAL HYPERTENSION: ICD-10-CM

## 2019-01-01 DIAGNOSIS — M10.9 ACUTE GOUT, UNSPECIFIED CAUSE, UNSPECIFIED SITE: ICD-10-CM

## 2019-01-01 DIAGNOSIS — R53.83 FATIGUE, UNSPECIFIED TYPE: ICD-10-CM

## 2019-01-01 DIAGNOSIS — R18.8 OTHER ASCITES: ICD-10-CM

## 2019-01-01 DIAGNOSIS — G89.29 CHRONIC MIDLINE LOW BACK PAIN WITHOUT SCIATICA: Primary | ICD-10-CM

## 2019-01-01 DIAGNOSIS — Z99.2 ESRD ON HEMODIALYSIS (HCC): ICD-10-CM

## 2019-01-01 DIAGNOSIS — R10.9 RIGHT FLANK PAIN: ICD-10-CM

## 2019-01-01 DIAGNOSIS — R06.00 EXERTIONAL DYSPNEA: Primary | ICD-10-CM

## 2019-01-01 DIAGNOSIS — M79.89 LEFT LEG SWELLING: Primary | ICD-10-CM

## 2019-01-01 DIAGNOSIS — Z79.4 TYPE 2 DIABETES MELLITUS WITH DIABETIC PERIPHERAL ANGIOPATHY WITHOUT GANGRENE, WITH LONG-TERM CURRENT USE OF INSULIN (HCC): ICD-10-CM

## 2019-01-01 DIAGNOSIS — M54.50 CHRONIC MIDLINE LOW BACK PAIN WITHOUT SCIATICA: ICD-10-CM

## 2019-01-01 DIAGNOSIS — D50.0 IRON DEFICIENCY ANEMIA DUE TO CHRONIC BLOOD LOSS: ICD-10-CM

## 2019-01-01 DIAGNOSIS — R06.00 DYSPNEA, UNSPECIFIED TYPE: Primary | ICD-10-CM

## 2019-01-01 DIAGNOSIS — R00.0 TACHYCARDIA: ICD-10-CM

## 2019-01-01 DIAGNOSIS — I50.23 ACUTE ON CHRONIC SYSTOLIC HEART FAILURE (HCC): ICD-10-CM

## 2019-01-01 DIAGNOSIS — E11.51 TYPE 2 DIABETES MELLITUS WITH DIABETIC PERIPHERAL ANGIOPATHY WITHOUT GANGRENE, WITH LONG-TERM CURRENT USE OF INSULIN (HCC): ICD-10-CM

## 2019-01-01 DIAGNOSIS — M54.50 CHRONIC MIDLINE LOW BACK PAIN WITHOUT SCIATICA: Primary | ICD-10-CM

## 2019-01-01 LAB
ABSOLUTE IMMATURE GRANULOCYTES (OFFPRE24): NORMAL
ABSOLUTE IMMATURE GRANULOCYTES (OFFPRE24): NORMAL
ALBUMIN SERPL BCP-MCNC: 2.6 G/DL (ref 3.5–4.8)
ALBUMIN SERPL BCP-MCNC: 2.7 G/DL (ref 3.5–4.8)
ALBUMIN SERPL BCP-MCNC: 2.8 G/DL (ref 3.5–4.8)
ALBUMIN SERPL BCP-MCNC: 2.9 G/DL (ref 3.5–4.8)
ALBUMIN SERPL BCP-MCNC: 2.9 G/DL (ref 3.5–4.8)
ALBUMIN SERPL BCP-MCNC: 3 G/DL (ref 3.5–4.8)
ALBUMIN SERPL BCP-MCNC: 3.2 G/DL (ref 3.5–4.8)
ALBUMIN SERPL BCP-MCNC: 3.4 G/DL (ref 3.5–4.8)
ALBUMIN SERPL BCP-MCNC: 3.7 G/DL (ref 3.5–4.8)
ALBUMIN SERPL BCP-MCNC: 3.8 G/DL (ref 3.5–4.8)
ALBUMIN SERPL BCP-MCNC: 3.9 G/DL (ref 3.5–4.8)
ALBUMIN SERPL BCP-MCNC: 4 G/DL (ref 3.5–4.8)
ALBUMIN SERPL-MCNC: 3.2 G/DL (ref 3.4–5)
ALBUMIN SERPL-MCNC: 3.3 G/DL (ref 3.4–5)
ALBUMIN SERPL-MCNC: 3.3 G/DL (ref 3.4–5)
ALBUMIN SERPL-MCNC: 3.4 G/DL (ref 3.4–5)
ALBUMIN SERPL-MCNC: 3.4 G/DL (ref 3.4–5)
ALBUMIN SERPL-MCNC: 3.5 G/DL (ref 3.4–5)
ALBUMIN SERPL-MCNC: 3.6 G/DL (ref 3.4–5)
ALBUMIN SERPL-MCNC: 3.6 G/DL (ref 3.4–5)
ALBUMIN SERPL-MCNC: 3.7 G/DL (ref 3.4–5)
ALBUMIN SERPL-MCNC: 3.7 G/DL (ref 3.4–5)
ALBUMIN SERPL-MCNC: 3.8 G/DL (ref 3.4–5)
ALBUMIN SERPL-MCNC: 4.3 G/DL (ref 3.4–5)
ALBUMIN SERPL-MCNC: 5 G/DL (ref 3.4–5)
ALBUMIN/GLOB SERPL: 0.9 {RATIO} (ref 1–2)
ALBUMIN/GLOB SERPL: 1.3 {RATIO} (ref 1–2)
ALP LIVER SERPL-CCNC: 77 U/L (ref 55–142)
ALP LIVER SERPL-CCNC: 88 U/L (ref 55–142)
ALP SERPL-CCNC: 44 U/L (ref 32–100)
ALP SERPL-CCNC: 47 U/L (ref 32–100)
ALP SERPL-CCNC: 51 U/L (ref 32–100)
ALP SERPL-CCNC: 54 U/L (ref 32–100)
ALT SERPL-CCNC: 10 U/L (ref 14–54)
ALT SERPL-CCNC: 10 U/L (ref 14–54)
ALT SERPL-CCNC: 11 U/L (ref 13–56)
ALT SERPL-CCNC: 11 U/L (ref 14–54)
ALT SERPL-CCNC: 14 U/L (ref 13–56)
ALT SERPL-CCNC: 9 U/L (ref 14–54)
AMMONIA PLAS-MCNC: 100 UG/DL (ref 19.5–64.6)
AMMONIA PLAS-MCNC: 50 UG/DL (ref 19.5–64.6)
AMMONIA PLAS-MCNC: 96 UG/DL (ref 19.5–64.6)
ANION GAP SERPL CALC-SCNC: 10 MMOL/L (ref 0–18)
ANION GAP SERPL CALC-SCNC: 10 MMOL/L (ref 0–18)
ANION GAP SERPL CALC-SCNC: 11 MMOL/L (ref 0–18)
ANION GAP SERPL CALC-SCNC: 12 MMOL/L (ref 0–18)
ANION GAP SERPL CALC-SCNC: 13 MMOL/L (ref 0–18)
ANION GAP SERPL CALC-SCNC: 13 MMOL/L (ref 0–18)
ANION GAP SERPL CALC-SCNC: 14 MMOL/L (ref 0–18)
ANION GAP SERPL CALC-SCNC: 15 MMOL/L (ref 0–18)
ANION GAP SERPL CALC-SCNC: 16 MMOL/L (ref 0–18)
ANION GAP SERPL CALC-SCNC: 18 MMOL/L (ref 0–18)
ANION GAP SERPL CALC-SCNC: 5 MMOL/L (ref 0–18)
ANION GAP SERPL CALC-SCNC: 6 MMOL/L (ref 0–18)
ANION GAP SERPL CALC-SCNC: 7 MMOL/L (ref 0–18)
ANION GAP SERPL CALC-SCNC: 7 MMOL/L (ref 0–18)
ANION GAP SERPL CALC-SCNC: 8 MMOL/L (ref 0–18)
ANION GAP SERPL CALC-SCNC: 9 MMOL/L (ref 0–18)
ANION GAP SERPL CALC-SCNC: NORMAL MMOL/L
ANION GAP SERPL CALC-SCNC: NORMAL MMOL/L
AST SERPL-CCNC: 11 U/L (ref 15–37)
AST SERPL-CCNC: 13 U/L (ref 15–41)
AST SERPL-CCNC: 15 U/L (ref 15–37)
AST SERPL-CCNC: 15 U/L (ref 15–41)
AST SERPL-CCNC: 16 U/L (ref 15–41)
AST SERPL-CCNC: 20 U/L (ref 15–41)
BASE EXCESS BLD CALC-SCNC: 10 MMOL/L (ref ?–2)
BASE EXCESS BLD CALC-SCNC: 10.1 MMOL/L (ref ?–2)
BASE EXCESS BLD CALC-SCNC: 10.9 MMOL/L (ref ?–2)
BASE EXCESS BLD CALC-SCNC: 10.9 MMOL/L (ref ?–2)
BASE EXCESS BLD CALC-SCNC: 11 MMOL/L (ref ?–2)
BASE EXCESS BLD CALC-SCNC: 11.4 MMOL/L (ref ?–2)
BASE EXCESS BLD CALC-SCNC: 11.5 MMOL/L (ref ?–2)
BASE EXCESS BLD CALC-SCNC: 11.6 MMOL/L (ref ?–2)
BASE EXCESS BLD CALC-SCNC: 12.8 MMOL/L (ref ?–2)
BASE EXCESS BLD CALC-SCNC: 4.9 MMOL/L (ref ?–2)
BASE EXCESS BLD CALC-SCNC: 5 MMOL/L (ref ?–2)
BASE EXCESS BLD CALC-SCNC: 5 MMOL/L (ref ?–2)
BASE EXCESS BLD CALC-SCNC: 6.4 MMOL/L (ref ?–2)
BASE EXCESS BLD CALC-SCNC: 7.2 MMOL/L (ref ?–2)
BASE EXCESS BLD CALC-SCNC: 7.3 MMOL/L (ref ?–2)
BASE EXCESS BLD CALC-SCNC: 8.3 MMOL/L (ref ?–2)
BASE EXCESS BLD CALC-SCNC: 8.4 MMOL/L (ref ?–2)
BASE EXCESS BLD CALC-SCNC: 9.6 MMOL/L (ref ?–2)
BASE EXCESS BLD CALC-SCNC: 9.7 MMOL/L (ref ?–2)
BASE EXCESS BLD CALC-SCNC: 9.7 MMOL/L (ref ?–2)
BASO+EOS+MONOS # BLD: NORMAL 10*3/UL
BASO+EOS+MONOS # BLD: NORMAL 10*3/UL
BASO+EOS+MONOS NFR BLD: NORMAL %
BASO+EOS+MONOS NFR BLD: NORMAL %
BASOPHILS # BLD AUTO: 0.01 X10(3) UL (ref 0–0.2)
BASOPHILS # BLD AUTO: 0.02 X10(3) UL (ref 0–0.2)
BASOPHILS # BLD AUTO: 0.03 X10(3) UL (ref 0–0.2)
BASOPHILS # BLD AUTO: 0.04 X10(3) UL (ref 0–0.2)
BASOPHILS # BLD AUTO: 0.05 X10(3) UL (ref 0–0.2)
BASOPHILS # BLD AUTO: 0.06 X10(3) UL (ref 0–0.2)
BASOPHILS # BLD: 0.11 X10(3) UL (ref 0–0.2)
BASOPHILS # BLD: NORMAL 10*3/UL
BASOPHILS # BLD: NORMAL 10*3/UL
BASOPHILS NFR BLD AUTO: 0.1 %
BASOPHILS NFR BLD AUTO: 0.2 %
BASOPHILS NFR BLD AUTO: 0.3 %
BASOPHILS NFR BLD AUTO: 0.4 %
BASOPHILS NFR BLD AUTO: 0.5 %
BASOPHILS NFR BLD AUTO: 0.6 %
BASOPHILS NFR BLD AUTO: 0.7 %
BASOPHILS NFR BLD: 1 %
BASOPHILS NFR BLD: NORMAL %
BASOPHILS NFR BLD: NORMAL %
BASOPHILS NFR FLD: 0 %
BILIRUB DIRECT SERPL-MCNC: 0.2 MG/DL (ref 0–0.2)
BILIRUB DIRECT SERPL-MCNC: 0.4 MG/DL (ref 0–0.2)
BILIRUB SERPL-MCNC: 0.7 MG/DL (ref 0.1–2)
BILIRUB SERPL-MCNC: 0.9 MG/DL (ref 0.3–1.2)
BILIRUB SERPL-MCNC: 1.1 MG/DL (ref 0.1–2)
BILIRUB SERPL-MCNC: 1.1 MG/DL (ref 0.3–1.2)
BILIRUB SERPL-MCNC: 1.2 MG/DL (ref 0.3–1.2)
BILIRUB SERPL-MCNC: 1.5 MG/DL (ref 0.3–1.2)
BILIRUB UR QL: NEGATIVE
BILIRUB UR QL: NEGATIVE
BLOOD GAS EPAP: 5 CM H2O
BLOOD GAS IPAP: 12 CM H2O
BNP SERPL-MCNC: 634 PG/ML (ref 0–100)
BUN BLD-MCNC: 31 MG/DL (ref 7–18)
BUN BLD-MCNC: 32 MG/DL (ref 7–18)
BUN BLD-MCNC: 33 MG/DL (ref 7–18)
BUN BLD-MCNC: 34 MG/DL (ref 7–18)
BUN BLD-MCNC: 34 MG/DL (ref 7–18)
BUN BLD-MCNC: 38 MG/DL (ref 7–18)
BUN BLD-MCNC: 39 MG/DL (ref 7–18)
BUN BLD-MCNC: 43 MG/DL (ref 7–18)
BUN BLD-MCNC: 43 MG/DL (ref 7–18)
BUN BLD-MCNC: 45 MG/DL (ref 7–18)
BUN BLD-MCNC: 46 MG/DL (ref 7–18)
BUN BLD-MCNC: 46 MG/DL (ref 7–18)
BUN BLD-MCNC: 47 MG/DL (ref 7–18)
BUN BLD-MCNC: 47 MG/DL (ref 7–18)
BUN BLD-MCNC: 48 MG/DL (ref 7–18)
BUN BLD-MCNC: 50 MG/DL (ref 7–18)
BUN BLD-MCNC: 52 MG/DL (ref 7–18)
BUN BLD-MCNC: 55 MG/DL (ref 7–18)
BUN BLD-MCNC: 59 MG/DL (ref 7–18)
BUN BLD-MCNC: 72 MG/DL (ref 7–18)
BUN BLD-MCNC: 78 MG/DL (ref 7–18)
BUN BLD-MCNC: 78 MG/DL (ref 7–18)
BUN BLD-MCNC: 79 MG/DL (ref 7–18)
BUN BLD-MCNC: 82 MG/DL (ref 7–18)
BUN BLD-MCNC: 82 MG/DL (ref 7–18)
BUN SERPL-MCNC: 100 MG/DL (ref 8–20)
BUN SERPL-MCNC: 101 MG/DL (ref 8–20)
BUN SERPL-MCNC: 105 MG/DL (ref 8–20)
BUN SERPL-MCNC: 105 MG/DL (ref 8–20)
BUN SERPL-MCNC: 34 MG/DL
BUN SERPL-MCNC: 34 MG/DL
BUN SERPL-MCNC: 65 MG/DL (ref 8–20)
BUN SERPL-MCNC: 66 MG/DL (ref 8–20)
BUN SERPL-MCNC: 68 MG/DL (ref 8–20)
BUN SERPL-MCNC: 68 MG/DL (ref 8–20)
BUN SERPL-MCNC: 72 MG/DL (ref 8–20)
BUN SERPL-MCNC: 75 MG/DL (ref 8–20)
BUN SERPL-MCNC: 79 MG/DL (ref 8–20)
BUN SERPL-MCNC: 81 MG/DL (ref 8–20)
BUN SERPL-MCNC: 85 MG/DL (ref 8–20)
BUN SERPL-MCNC: 85 MG/DL (ref 8–20)
BUN SERPL-MCNC: 91 MG/DL (ref 8–20)
BUN SERPL-MCNC: 96 MG/DL (ref 8–20)
BUN/CREAT SERPL: 16.2 (ref 10–20)
BUN/CREAT SERPL: 17.9 (ref 10–20)
BUN/CREAT SERPL: 20.8 (ref 10–20)
BUN/CREAT SERPL: 22.5 (ref 10–20)
BUN/CREAT SERPL: 22.5 (ref 10–20)
BUN/CREAT SERPL: 29.7 (ref 10–20)
BUN/CREAT SERPL: 30.4 (ref 10–20)
BUN/CREAT SERPL: 30.7 (ref 10–20)
BUN/CREAT SERPL: 32.1 (ref 10–20)
BUN/CREAT SERPL: 32.5 (ref 10–20)
BUN/CREAT SERPL: 32.5 (ref 10–20)
BUN/CREAT SERPL: 34.3 (ref 10–20)
BUN/CREAT SERPL: 34.5 (ref 10–20)
BUN/CREAT SERPL: 35.3 (ref 10–20)
BUN/CREAT SERPL: 36.7 (ref 10–20)
BUN/CREAT SERPL: 36.7 (ref 10–20)
BUN/CREAT SERPL: 37.5 (ref 10–20)
BUN/CREAT SERPL: 37.9 (ref 10–20)
BUN/CREAT SERPL: 41.4 (ref 10–20)
BUN/CREAT SERPL: 43.2 (ref 10–20)
BUN/CREAT SERPL: 44.2 (ref 10–20)
BUN/CREAT SERPL: 46.4 (ref 10–20)
BUN/CREAT SERPL: 48.1 (ref 10–20)
BUN/CREAT SERPL: 48.6 (ref 10–20)
BUN/CREAT SERPL: 49.1 (ref 10–20)
BUN/CREAT SERPL: 6 (ref 10–20)
BUN/CREAT SERPL: 6.1 (ref 10–20)
BUN/CREAT SERPL: 6.2 (ref 10–20)
BUN/CREAT SERPL: 7.1 (ref 10–20)
BUN/CREAT SERPL: 7.2 (ref 10–20)
BUN/CREAT SERPL: 7.2 (ref 10–20)
BUN/CREAT SERPL: 7.6 (ref 10–20)
BUN/CREAT SERPL: 7.6 (ref 10–20)
BUN/CREAT SERPL: 7.9 (ref 10–20)
BUN/CREAT SERPL: 8.3 (ref 10–20)
BUN/CREAT SERPL: 8.5 (ref 10–20)
BUN/CREAT SERPL: 8.6 (ref 10–20)
BUN/CREAT SERPL: 8.9 (ref 10–20)
BUN/CREAT SERPL: 9.1 (ref 10–20)
BUN/CREAT SERPL: NORMAL
BUN/CREAT SERPL: NORMAL
CALCIUM BLD-MCNC: 8.4 MG/DL (ref 8.5–10.1)
CALCIUM BLD-MCNC: 8.4 MG/DL (ref 8.5–10.1)
CALCIUM BLD-MCNC: 8.5 MG/DL (ref 8.5–10.1)
CALCIUM BLD-MCNC: 8.5 MG/DL (ref 8.5–10.1)
CALCIUM BLD-MCNC: 8.6 MG/DL (ref 8.5–10.1)
CALCIUM BLD-MCNC: 8.6 MG/DL (ref 8.5–10.1)
CALCIUM BLD-MCNC: 8.8 MG/DL (ref 8.5–10.1)
CALCIUM BLD-MCNC: 8.9 MG/DL (ref 8.5–10.1)
CALCIUM BLD-MCNC: 9 MG/DL (ref 8.5–10.1)
CALCIUM BLD-MCNC: 9 MG/DL (ref 8.5–10.1)
CALCIUM BLD-MCNC: 9.1 MG/DL (ref 8.5–10.1)
CALCIUM BLD-MCNC: 9.1 MG/DL (ref 8.5–10.1)
CALCIUM BLD-MCNC: 9.2 MG/DL (ref 8.5–10.1)
CALCIUM BLD-MCNC: 9.2 MG/DL (ref 8.5–10.1)
CALCIUM BLD-MCNC: 9.3 MG/DL (ref 8.5–10.1)
CALCIUM BLD-MCNC: 9.6 MG/DL (ref 8.5–10.1)
CALCIUM SERPL-MCNC: 8.3 MG/DL (ref 8.5–10.5)
CALCIUM SERPL-MCNC: 8.3 MG/DL (ref 8.5–10.5)
CALCIUM SERPL-MCNC: 8.4 MG/DL (ref 8.5–10.5)
CALCIUM SERPL-MCNC: 8.5 MG/DL (ref 8.5–10.5)
CALCIUM SERPL-MCNC: 8.6 MG/DL (ref 8.5–10.5)
CALCIUM SERPL-MCNC: 8.7 MG/DL (ref 8.5–10.5)
CALCIUM SERPL-MCNC: 8.8 MG/DL (ref 8.5–10.5)
CALCIUM SERPL-MCNC: 8.9 MG/DL (ref 8.5–10.5)
CALCIUM SERPL-MCNC: 9 MG/DL (ref 8.5–10.5)
CALCIUM SERPL-MCNC: 9.3 MG/DL
CALCIUM SERPL-MCNC: 9.3 MG/DL
CHLORIDE SERPL-SCNC: 100 MMOL/L (ref 95–110)
CHLORIDE SERPL-SCNC: 101 MMOL/L (ref 95–110)
CHLORIDE SERPL-SCNC: 102 MMOL/L (ref 95–110)
CHLORIDE SERPL-SCNC: 102 MMOL/L (ref 95–110)
CHLORIDE SERPL-SCNC: 103 MMOL/L (ref 95–110)
CHLORIDE SERPL-SCNC: 104 MMOL/L (ref 95–110)
CHLORIDE SERPL-SCNC: 104 MMOL/L (ref 95–110)
CHLORIDE SERPL-SCNC: 90 MMOL/L (ref 98–112)
CHLORIDE SERPL-SCNC: 90 MMOL/L (ref 98–112)
CHLORIDE SERPL-SCNC: 92 MMOL/L (ref 95–110)
CHLORIDE SERPL-SCNC: 92 MMOL/L (ref 98–112)
CHLORIDE SERPL-SCNC: 93 MMOL/L (ref 95–110)
CHLORIDE SERPL-SCNC: 93 MMOL/L (ref 98–112)
CHLORIDE SERPL-SCNC: 94 MMOL/L (ref 95–110)
CHLORIDE SERPL-SCNC: 94 MMOL/L (ref 98–107)
CHLORIDE SERPL-SCNC: 94 MMOL/L (ref 98–107)
CHLORIDE SERPL-SCNC: 94 MMOL/L (ref 98–112)
CHLORIDE SERPL-SCNC: 95 MMOL/L (ref 95–110)
CHLORIDE SERPL-SCNC: 95 MMOL/L (ref 98–107)
CHLORIDE SERPL-SCNC: 95 MMOL/L (ref 98–112)
CHLORIDE SERPL-SCNC: 96 MMOL/L
CHLORIDE SERPL-SCNC: 96 MMOL/L
CHLORIDE SERPL-SCNC: 96 MMOL/L (ref 95–110)
CHLORIDE SERPL-SCNC: 96 MMOL/L (ref 98–107)
CHLORIDE SERPL-SCNC: 96 MMOL/L (ref 98–107)
CHLORIDE SERPL-SCNC: 96 MMOL/L (ref 98–112)
CHLORIDE SERPL-SCNC: 96 MMOL/L (ref 98–112)
CHLORIDE SERPL-SCNC: 97 MMOL/L (ref 95–110)
CHLORIDE SERPL-SCNC: 97 MMOL/L (ref 98–107)
CHLORIDE SERPL-SCNC: 98 MMOL/L (ref 98–107)
CHLORIDE SERPL-SCNC: 99 MMOL/L (ref 95–110)
CHLORIDE SERPL-SCNC: 99 MMOL/L (ref 98–107)
CHLORIDE SERPL-SCNC: 99 MMOL/L (ref 98–112)
CHOLEST SMN-MCNC: 80 MG/DL (ref ?–200)
CLARITY UR: CLEAR
CO2 SERPL-SCNC: 17 MMOL/L (ref 21–32)
CO2 SERPL-SCNC: 20 MMOL/L (ref 21–32)
CO2 SERPL-SCNC: 21 MMOL/L (ref 21–32)
CO2 SERPL-SCNC: 23 MMOL/L (ref 21–32)
CO2 SERPL-SCNC: 24 MMOL/L (ref 21–32)
CO2 SERPL-SCNC: 25 MMOL/L (ref 21–32)
CO2 SERPL-SCNC: 25 MMOL/L (ref 21–32)
CO2 SERPL-SCNC: 25 MMOL/L (ref 22–32)
CO2 SERPL-SCNC: 26 MMOL/L (ref 21–32)
CO2 SERPL-SCNC: 26 MMOL/L (ref 22–32)
CO2 SERPL-SCNC: 27 MMOL/L (ref 21–32)
CO2 SERPL-SCNC: 28 MMOL/L (ref 21–32)
CO2 SERPL-SCNC: 28 MMOL/L (ref 21–32)
CO2 SERPL-SCNC: 28 MMOL/L (ref 22–32)
CO2 SERPL-SCNC: 29 MMOL/L (ref 21–32)
CO2 SERPL-SCNC: 29 MMOL/L (ref 22–32)
CO2 SERPL-SCNC: 30 MMOL/L (ref 21–32)
CO2 SERPL-SCNC: 30 MMOL/L (ref 21–32)
CO2 SERPL-SCNC: 30 MMOL/L (ref 22–32)
CO2 SERPL-SCNC: 31 MMOL/L (ref 21–32)
CO2 SERPL-SCNC: 31 MMOL/L (ref 21–32)
CO2 SERPL-SCNC: 31 MMOL/L (ref 22–32)
CO2 SERPL-SCNC: 32 MMOL/L (ref 21–32)
CO2 SERPL-SCNC: 32 MMOL/L (ref 22–32)
CO2 SERPL-SCNC: 33 MMOL/L (ref 21–32)
CO2 SERPL-SCNC: 33 MMOL/L (ref 22–32)
CO2 SERPL-SCNC: NORMAL MMOL/L
CO2 SERPL-SCNC: NORMAL MMOL/L
COLOR UR: YELLOW
COLOR UR: YELLOW
CREAT BLD-MCNC: 1.83 MG/DL (ref 0.55–1.02)
CREAT BLD-MCNC: 1.85 MG/DL (ref 0.55–1.02)
CREAT BLD-MCNC: 2.09 MG/DL (ref 0.55–1.02)
CREAT BLD-MCNC: 2.09 MG/DL (ref 0.55–1.02)
CREAT BLD-MCNC: 2.1 MG/DL (ref 0.55–1.02)
CREAT BLD-MCNC: 2.37 MG/DL (ref 0.55–1.02)
CREAT BLD-MCNC: 2.39 MG/DL (ref 0.55–1.02)
CREAT BLD-MCNC: 2.39 MG/DL (ref 0.55–1.02)
CREAT BLD-MCNC: 2.4 MG/DL (ref 0.55–1.02)
CREAT BLD-MCNC: 4.81 MG/DL (ref 0.55–1.02)
CREAT BLD-MCNC: 4.92 MG/DL (ref 0.55–1.02)
CREAT BLD-MCNC: 5.06 MG/DL (ref 0.55–1.02)
CREAT BLD-MCNC: 5.14 MG/DL (ref 0.55–1.02)
CREAT BLD-MCNC: 5.23 MG/DL (ref 0.55–1.02)
CREAT BLD-MCNC: 5.33 MG/DL (ref 0.55–1.02)
CREAT BLD-MCNC: 5.56 MG/DL (ref 0.55–1.02)
CREAT BLD-MCNC: 5.72 MG/DL (ref 0.55–1.02)
CREAT BLD-MCNC: 5.79 MG/DL (ref 0.55–1.02)
CREAT BLD-MCNC: 6.25 MG/DL (ref 0.55–1.02)
CREAT BLD-MCNC: 6.32 MG/DL (ref 0.55–1.02)
CREAT BLD-MCNC: 6.35 MG/DL (ref 0.55–1.02)
CREAT BLD-MCNC: 7.78 MG/DL (ref 0.55–1.02)
CREAT BLD-MCNC: 8.88 MG/DL (ref 0.55–1.02)
CREAT SERPL-MCNC: 1.48 MG/DL (ref 0.5–1.5)
CREAT SERPL-MCNC: 1.54 MG/DL (ref 0.5–1.5)
CREAT SERPL-MCNC: 1.56 MG/DL (ref 0.5–1.5)
CREAT SERPL-MCNC: 1.57 MG/DL (ref 0.5–1.5)
CREAT SERPL-MCNC: 1.61 MG/DL (ref 0.5–1.5)
CREAT SERPL-MCNC: 1.8 MG/DL (ref 0.5–1.5)
CREAT SERPL-MCNC: 1.83 MG/DL (ref 0.5–1.5)
CREAT SERPL-MCNC: 1.98 MG/DL (ref 0.5–1.5)
CREAT SERPL-MCNC: 2.21 MG/DL (ref 0.5–1.5)
CREAT SERPL-MCNC: 2.22 MG/DL (ref 0.5–1.5)
CREAT SERPL-MCNC: 2.24 MG/DL (ref 0.5–1.5)
CREAT SERPL-MCNC: 2.58 MG/DL (ref 0.5–1.5)
CREAT SERPL-MCNC: 2.67 MG/DL (ref 0.5–1.5)
CREAT SERPL-MCNC: 3.04 MG/DL (ref 0.5–1.5)
CREAT SERPL-MCNC: 3.27 MG/DL (ref 0.5–1.5)
CREAT SERPL-MCNC: 3.29 MG/DL (ref 0.5–1.5)
CREAT SERPL-MCNC: 4.81 MG/DL
CREAT SERPL-MCNC: 4.81 MG/DL
CREAT UR-MCNC: 59.2 MG/DL
DEPRECATED HBV CORE AB SER IA-ACNC: 294.2 NG/ML (ref 18–340)
DEPRECATED RDW RBC AUTO: 52.2 FL (ref 35.1–46.3)
DEPRECATED RDW RBC AUTO: 52.9 FL (ref 35.1–46.3)
DEPRECATED RDW RBC AUTO: 53.1 FL (ref 35.1–46.3)
DEPRECATED RDW RBC AUTO: 53.1 FL (ref 35.1–46.3)
DEPRECATED RDW RBC AUTO: 53.2 FL (ref 35.1–46.3)
DEPRECATED RDW RBC AUTO: 53.2 FL (ref 35.1–46.3)
DEPRECATED RDW RBC AUTO: 53.6 FL (ref 35.1–46.3)
DEPRECATED RDW RBC AUTO: 53.7 FL (ref 35.1–46.3)
DEPRECATED RDW RBC AUTO: 54.2 FL (ref 35.1–46.3)
DEPRECATED RDW RBC AUTO: 54.3 FL (ref 35.1–46.3)
DEPRECATED RDW RBC AUTO: 54.4 FL (ref 35.1–46.3)
DEPRECATED RDW RBC AUTO: 54.7 FL (ref 35.1–46.3)
DEPRECATED RDW RBC AUTO: 54.9 FL (ref 35.1–46.3)
DEPRECATED RDW RBC AUTO: 55.3 FL (ref 35.1–46.3)
DEPRECATED RDW RBC AUTO: 55.7 FL (ref 35.1–46.3)
DEPRECATED RDW RBC AUTO: 56.1 FL (ref 35.1–46.3)
DEPRECATED RDW RBC AUTO: 56.2 FL (ref 35.1–46.3)
DEPRECATED RDW RBC AUTO: 56.6 FL (ref 35.1–46.3)
DEPRECATED RDW RBC AUTO: 56.7 FL (ref 35.1–46.3)
DEPRECATED RDW RBC AUTO: 57.9 FL (ref 35.1–46.3)
DEPRECATED RDW RBC AUTO: 59.2 FL (ref 35.1–46.3)
DEPRECATED RDW RBC AUTO: 59.4 FL (ref 35.1–46.3)
DEPRECATED RDW RBC AUTO: 59.7 FL (ref 35.1–46.3)
DEPRECATED RDW RBC AUTO: 59.7 FL (ref 35.1–46.3)
DEPRECATED RDW RBC AUTO: 60.6 FL (ref 35.1–46.3)
DEPRECATED RDW RBC AUTO: 61.4 FL (ref 35.1–46.3)
DEPRECATED RDW RBC AUTO: 61.5 FL (ref 35.1–46.3)
DEPRECATED RDW RBC AUTO: 62.4 FL (ref 35.1–46.3)
DEPRECATED RDW RBC AUTO: 63.6 FL (ref 35.1–46.3)
DEPRECATED RDW RBC AUTO: 71.4 FL (ref 35.1–46.3)
DEPRECATED RDW RBC AUTO: 72 FL (ref 35.1–46.3)
DEPRECATED RDW RBC AUTO: 72.5 FL (ref 35.1–46.3)
DEPRECATED RDW RBC AUTO: 74.3 FL (ref 35.1–46.3)
DEPRECATED RDW RBC AUTO: 74.5 FL (ref 35.1–46.3)
DIFFERENTIAL METHOD BLD: NORMAL
DIFFERENTIAL METHOD BLD: NORMAL
DIGOXIN SERPL-MCNC: 1.5 NG/ML (ref 0.8–2)
DIGOXIN SERPL-MCNC: 1.52 NG/ML (ref 0.8–2)
DIGOXIN SERPL-MCNC: 1.93 NG/ML (ref 0.8–2)
DIGOXIN SERPL-MCNC: 2.5 NG/ML (ref 0.8–2.1)
EOSINOPHIL # BLD AUTO: 0.01 X10(3) UL (ref 0–0.7)
EOSINOPHIL # BLD AUTO: 0.02 X10(3) UL (ref 0–0.7)
EOSINOPHIL # BLD AUTO: 0.03 X10(3) UL (ref 0–0.7)
EOSINOPHIL # BLD AUTO: 0.04 X10(3) UL (ref 0–0.7)
EOSINOPHIL # BLD AUTO: 0.04 X10(3) UL (ref 0–0.7)
EOSINOPHIL # BLD AUTO: 0.05 X10(3) UL (ref 0–0.7)
EOSINOPHIL # BLD AUTO: 0.06 X10(3) UL (ref 0–0.7)
EOSINOPHIL # BLD AUTO: 0.07 X10(3) UL (ref 0–0.7)
EOSINOPHIL # BLD AUTO: 0.09 X10(3) UL (ref 0–0.7)
EOSINOPHIL # BLD AUTO: 0.09 X10(3) UL (ref 0–0.7)
EOSINOPHIL # BLD AUTO: 0.1 X10(3) UL (ref 0–0.7)
EOSINOPHIL # BLD AUTO: 0.12 X10(3) UL (ref 0–0.7)
EOSINOPHIL # BLD AUTO: 0.14 X10(3) UL (ref 0–0.7)
EOSINOPHIL # BLD AUTO: 0.14 X10(3) UL (ref 0–0.7)
EOSINOPHIL # BLD AUTO: 0.15 X10(3) UL (ref 0–0.7)
EOSINOPHIL # BLD AUTO: 0.15 X10(3) UL (ref 0–0.7)
EOSINOPHIL # BLD AUTO: 0.16 X10(3) UL (ref 0–0.7)
EOSINOPHIL # BLD AUTO: 0.17 X10(3) UL (ref 0–0.7)
EOSINOPHIL # BLD: 0 X10(3) UL (ref 0–0.7)
EOSINOPHIL # BLD: NORMAL 10*3/UL
EOSINOPHIL # BLD: NORMAL 10*3/UL
EOSINOPHIL NFR BLD AUTO: 0.1 %
EOSINOPHIL NFR BLD AUTO: 0.2 %
EOSINOPHIL NFR BLD AUTO: 0.2 %
EOSINOPHIL NFR BLD AUTO: 0.3 %
EOSINOPHIL NFR BLD AUTO: 0.3 %
EOSINOPHIL NFR BLD AUTO: 0.4 %
EOSINOPHIL NFR BLD AUTO: 0.4 %
EOSINOPHIL NFR BLD AUTO: 0.6 %
EOSINOPHIL NFR BLD AUTO: 0.7 %
EOSINOPHIL NFR BLD AUTO: 0.9 %
EOSINOPHIL NFR BLD AUTO: 0.9 %
EOSINOPHIL NFR BLD AUTO: 1.1 %
EOSINOPHIL NFR BLD AUTO: 1.1 %
EOSINOPHIL NFR BLD AUTO: 1.2 %
EOSINOPHIL NFR BLD AUTO: 1.2 %
EOSINOPHIL NFR BLD AUTO: 1.3 %
EOSINOPHIL NFR BLD AUTO: 1.4 %
EOSINOPHIL NFR BLD AUTO: 1.4 %
EOSINOPHIL NFR BLD AUTO: 1.5 %
EOSINOPHIL NFR BLD AUTO: 1.7 %
EOSINOPHIL NFR BLD AUTO: 1.8 %
EOSINOPHIL NFR BLD AUTO: 2.1 %
EOSINOPHIL NFR BLD: 0 %
EOSINOPHIL NFR BLD: NORMAL %
EOSINOPHIL NFR BLD: NORMAL %
EOSINOPHIL NFR FLD: 0 %
ERYTHROCYTE [DISTWIDTH] IN BLOOD BY AUTOMATED COUNT: 15.4 % (ref 11–15)
ERYTHROCYTE [DISTWIDTH] IN BLOOD BY AUTOMATED COUNT: 15.5 % (ref 11–15)
ERYTHROCYTE [DISTWIDTH] IN BLOOD BY AUTOMATED COUNT: 15.6 % (ref 11–15)
ERYTHROCYTE [DISTWIDTH] IN BLOOD BY AUTOMATED COUNT: 15.7 % (ref 11–15)
ERYTHROCYTE [DISTWIDTH] IN BLOOD BY AUTOMATED COUNT: 15.9 % (ref 11–15)
ERYTHROCYTE [DISTWIDTH] IN BLOOD BY AUTOMATED COUNT: 15.9 % (ref 11–15)
ERYTHROCYTE [DISTWIDTH] IN BLOOD BY AUTOMATED COUNT: 16.1 % (ref 11–15)
ERYTHROCYTE [DISTWIDTH] IN BLOOD BY AUTOMATED COUNT: 16.2 % (ref 11–15)
ERYTHROCYTE [DISTWIDTH] IN BLOOD BY AUTOMATED COUNT: 16.2 % (ref 11–15)
ERYTHROCYTE [DISTWIDTH] IN BLOOD BY AUTOMATED COUNT: 16.4 % (ref 11–15)
ERYTHROCYTE [DISTWIDTH] IN BLOOD BY AUTOMATED COUNT: 16.5 % (ref 11–15)
ERYTHROCYTE [DISTWIDTH] IN BLOOD BY AUTOMATED COUNT: 16.7 % (ref 11–15)
ERYTHROCYTE [DISTWIDTH] IN BLOOD BY AUTOMATED COUNT: 17 % (ref 11–15)
ERYTHROCYTE [DISTWIDTH] IN BLOOD BY AUTOMATED COUNT: 17.2 % (ref 11–15)
ERYTHROCYTE [DISTWIDTH] IN BLOOD BY AUTOMATED COUNT: 17.4 % (ref 11–15)
ERYTHROCYTE [DISTWIDTH] IN BLOOD BY AUTOMATED COUNT: 17.5 % (ref 11–15)
ERYTHROCYTE [DISTWIDTH] IN BLOOD BY AUTOMATED COUNT: 17.5 % (ref 11–15)
ERYTHROCYTE [DISTWIDTH] IN BLOOD BY AUTOMATED COUNT: 17.6 % (ref 11–15)
ERYTHROCYTE [DISTWIDTH] IN BLOOD BY AUTOMATED COUNT: 18.3 % (ref 11–15)
ERYTHROCYTE [DISTWIDTH] IN BLOOD BY AUTOMATED COUNT: 18.4 % (ref 11–15)
ERYTHROCYTE [DISTWIDTH] IN BLOOD BY AUTOMATED COUNT: 18.8 % (ref 11–15)
ERYTHROCYTE [DISTWIDTH] IN BLOOD BY AUTOMATED COUNT: 19.1 % (ref 11–15)
ERYTHROCYTE [DISTWIDTH] IN BLOOD BY AUTOMATED COUNT: 20.4 % (ref 11–15)
ERYTHROCYTE [DISTWIDTH] IN BLOOD BY AUTOMATED COUNT: 20.6 % (ref 11–15)
ERYTHROCYTE [DISTWIDTH] IN BLOOD BY AUTOMATED COUNT: 20.9 % (ref 11–15)
ERYTHROCYTE [DISTWIDTH] IN BLOOD: NORMAL %
ERYTHROCYTE [DISTWIDTH] IN BLOOD: NORMAL %
EST. AVERAGE GLUCOSE BLD GHB EST-MCNC: 140 MG/DL (ref 68–126)
EST. AVERAGE GLUCOSE BLD GHB EST-MCNC: 143 MG/DL (ref 68–126)
EST. AVERAGE GLUCOSE BLD GHB EST-MCNC: 146 MG/DL (ref 68–126)
GLOBULIN PLAS-MCNC: 2.7 G/DL (ref 2.5–3.7)
GLOBULIN PLAS-MCNC: 3.3 G/DL (ref 2.5–3.7)
GLOBULIN PLAS-MCNC: 3.7 G/DL (ref 2.8–4.4)
GLOBULIN PLAS-MCNC: 3.8 G/DL (ref 2.8–4.4)
GLUCOSE BLD-MCNC: 100 MG/DL (ref 70–99)
GLUCOSE BLD-MCNC: 117 MG/DL (ref 70–99)
GLUCOSE BLD-MCNC: 123 MG/DL (ref 70–99)
GLUCOSE BLD-MCNC: 123 MG/DL (ref 70–99)
GLUCOSE BLD-MCNC: 128 MG/DL (ref 70–99)
GLUCOSE BLD-MCNC: 131 MG/DL (ref 70–99)
GLUCOSE BLD-MCNC: 142 MG/DL (ref 70–99)
GLUCOSE BLD-MCNC: 151 MG/DL (ref 70–99)
GLUCOSE BLD-MCNC: 159 MG/DL (ref 70–99)
GLUCOSE BLD-MCNC: 167 MG/DL (ref 70–99)
GLUCOSE BLD-MCNC: 172 MG/DL (ref 70–99)
GLUCOSE BLD-MCNC: 195 MG/DL (ref 70–99)
GLUCOSE BLD-MCNC: 200 MG/DL (ref 70–99)
GLUCOSE BLD-MCNC: 211 MG/DL (ref 70–99)
GLUCOSE BLD-MCNC: 213 MG/DL (ref 70–99)
GLUCOSE BLD-MCNC: 213 MG/DL (ref 70–99)
GLUCOSE BLD-MCNC: 251 MG/DL (ref 70–99)
GLUCOSE BLD-MCNC: 61 MG/DL (ref 70–99)
GLUCOSE BLD-MCNC: 78 MG/DL (ref 70–99)
GLUCOSE BLD-MCNC: 82 MG/DL (ref 70–99)
GLUCOSE BLD-MCNC: 83 MG/DL (ref 70–99)
GLUCOSE BLD-MCNC: 83 MG/DL (ref 70–99)
GLUCOSE BLD-MCNC: 88 MG/DL (ref 70–99)
GLUCOSE BLD-MCNC: 89 MG/DL (ref 70–99)
GLUCOSE BLD-MCNC: 97 MG/DL (ref 70–99)
GLUCOSE BLDC GLUCOMTR-MCNC: 100 MG/DL (ref 70–99)
GLUCOSE BLDC GLUCOMTR-MCNC: 101 MG/DL (ref 70–99)
GLUCOSE BLDC GLUCOMTR-MCNC: 103 MG/DL (ref 70–99)
GLUCOSE BLDC GLUCOMTR-MCNC: 105 MG/DL (ref 70–99)
GLUCOSE BLDC GLUCOMTR-MCNC: 109 MG/DL (ref 70–99)
GLUCOSE BLDC GLUCOMTR-MCNC: 110 MG/DL (ref 70–99)
GLUCOSE BLDC GLUCOMTR-MCNC: 113 MG/DL (ref 70–99)
GLUCOSE BLDC GLUCOMTR-MCNC: 114 MG/DL (ref 70–99)
GLUCOSE BLDC GLUCOMTR-MCNC: 115 MG/DL (ref 70–99)
GLUCOSE BLDC GLUCOMTR-MCNC: 115 MG/DL (ref 70–99)
GLUCOSE BLDC GLUCOMTR-MCNC: 116 MG/DL (ref 70–99)
GLUCOSE BLDC GLUCOMTR-MCNC: 117 MG/DL (ref 70–99)
GLUCOSE BLDC GLUCOMTR-MCNC: 117 MG/DL (ref 70–99)
GLUCOSE BLDC GLUCOMTR-MCNC: 119 MG/DL (ref 70–99)
GLUCOSE BLDC GLUCOMTR-MCNC: 121 MG/DL (ref 70–99)
GLUCOSE BLDC GLUCOMTR-MCNC: 125 MG/DL (ref 70–99)
GLUCOSE BLDC GLUCOMTR-MCNC: 127 MG/DL (ref 70–99)
GLUCOSE BLDC GLUCOMTR-MCNC: 128 MG/DL (ref 70–99)
GLUCOSE BLDC GLUCOMTR-MCNC: 128 MG/DL (ref 70–99)
GLUCOSE BLDC GLUCOMTR-MCNC: 129 MG/DL (ref 70–99)
GLUCOSE BLDC GLUCOMTR-MCNC: 131 MG/DL (ref 70–99)
GLUCOSE BLDC GLUCOMTR-MCNC: 131 MG/DL (ref 70–99)
GLUCOSE BLDC GLUCOMTR-MCNC: 134 MG/DL (ref 70–99)
GLUCOSE BLDC GLUCOMTR-MCNC: 135 MG/DL (ref 70–99)
GLUCOSE BLDC GLUCOMTR-MCNC: 136 MG/DL (ref 70–99)
GLUCOSE BLDC GLUCOMTR-MCNC: 139 MG/DL (ref 70–99)
GLUCOSE BLDC GLUCOMTR-MCNC: 139 MG/DL (ref 70–99)
GLUCOSE BLDC GLUCOMTR-MCNC: 140 MG/DL (ref 70–99)
GLUCOSE BLDC GLUCOMTR-MCNC: 140 MG/DL (ref 70–99)
GLUCOSE BLDC GLUCOMTR-MCNC: 141 MG/DL (ref 70–99)
GLUCOSE BLDC GLUCOMTR-MCNC: 141 MG/DL (ref 70–99)
GLUCOSE BLDC GLUCOMTR-MCNC: 142 MG/DL (ref 70–99)
GLUCOSE BLDC GLUCOMTR-MCNC: 144 MG/DL (ref 70–99)
GLUCOSE BLDC GLUCOMTR-MCNC: 145 MG/DL (ref 70–99)
GLUCOSE BLDC GLUCOMTR-MCNC: 146 MG/DL (ref 70–99)
GLUCOSE BLDC GLUCOMTR-MCNC: 147 MG/DL (ref 70–99)
GLUCOSE BLDC GLUCOMTR-MCNC: 147 MG/DL (ref 70–99)
GLUCOSE BLDC GLUCOMTR-MCNC: 149 MG/DL (ref 70–99)
GLUCOSE BLDC GLUCOMTR-MCNC: 149 MG/DL (ref 70–99)
GLUCOSE BLDC GLUCOMTR-MCNC: 151 MG/DL (ref 70–99)
GLUCOSE BLDC GLUCOMTR-MCNC: 152 MG/DL (ref 70–99)
GLUCOSE BLDC GLUCOMTR-MCNC: 152 MG/DL (ref 70–99)
GLUCOSE BLDC GLUCOMTR-MCNC: 154 MG/DL (ref 70–99)
GLUCOSE BLDC GLUCOMTR-MCNC: 155 MG/DL (ref 70–99)
GLUCOSE BLDC GLUCOMTR-MCNC: 158 MG/DL (ref 70–99)
GLUCOSE BLDC GLUCOMTR-MCNC: 159 MG/DL (ref 70–99)
GLUCOSE BLDC GLUCOMTR-MCNC: 162 MG/DL (ref 70–99)
GLUCOSE BLDC GLUCOMTR-MCNC: 162 MG/DL (ref 70–99)
GLUCOSE BLDC GLUCOMTR-MCNC: 164 MG/DL (ref 70–99)
GLUCOSE BLDC GLUCOMTR-MCNC: 164 MG/DL (ref 70–99)
GLUCOSE BLDC GLUCOMTR-MCNC: 165 MG/DL (ref 70–99)
GLUCOSE BLDC GLUCOMTR-MCNC: 166 MG/DL (ref 70–99)
GLUCOSE BLDC GLUCOMTR-MCNC: 167 MG/DL (ref 70–99)
GLUCOSE BLDC GLUCOMTR-MCNC: 171 MG/DL (ref 70–99)
GLUCOSE BLDC GLUCOMTR-MCNC: 172 MG/DL (ref 70–99)
GLUCOSE BLDC GLUCOMTR-MCNC: 172 MG/DL (ref 70–99)
GLUCOSE BLDC GLUCOMTR-MCNC: 174 MG/DL (ref 70–99)
GLUCOSE BLDC GLUCOMTR-MCNC: 174 MG/DL (ref 70–99)
GLUCOSE BLDC GLUCOMTR-MCNC: 175 MG/DL (ref 70–99)
GLUCOSE BLDC GLUCOMTR-MCNC: 177 MG/DL (ref 70–99)
GLUCOSE BLDC GLUCOMTR-MCNC: 178 MG/DL (ref 70–99)
GLUCOSE BLDC GLUCOMTR-MCNC: 179 MG/DL (ref 70–99)
GLUCOSE BLDC GLUCOMTR-MCNC: 182 MG/DL (ref 70–99)
GLUCOSE BLDC GLUCOMTR-MCNC: 183 MG/DL (ref 70–99)
GLUCOSE BLDC GLUCOMTR-MCNC: 185 MG/DL (ref 70–99)
GLUCOSE BLDC GLUCOMTR-MCNC: 185 MG/DL (ref 70–99)
GLUCOSE BLDC GLUCOMTR-MCNC: 186 MG/DL (ref 70–99)
GLUCOSE BLDC GLUCOMTR-MCNC: 188 MG/DL (ref 70–99)
GLUCOSE BLDC GLUCOMTR-MCNC: 189 MG/DL (ref 70–99)
GLUCOSE BLDC GLUCOMTR-MCNC: 189 MG/DL (ref 70–99)
GLUCOSE BLDC GLUCOMTR-MCNC: 190 MG/DL (ref 70–99)
GLUCOSE BLDC GLUCOMTR-MCNC: 190 MG/DL (ref 70–99)
GLUCOSE BLDC GLUCOMTR-MCNC: 191 MG/DL (ref 70–99)
GLUCOSE BLDC GLUCOMTR-MCNC: 193 MG/DL (ref 70–99)
GLUCOSE BLDC GLUCOMTR-MCNC: 193 MG/DL (ref 70–99)
GLUCOSE BLDC GLUCOMTR-MCNC: 195 MG/DL (ref 70–99)
GLUCOSE BLDC GLUCOMTR-MCNC: 196 MG/DL (ref 70–99)
GLUCOSE BLDC GLUCOMTR-MCNC: 197 MG/DL (ref 70–99)
GLUCOSE BLDC GLUCOMTR-MCNC: 197 MG/DL (ref 70–99)
GLUCOSE BLDC GLUCOMTR-MCNC: 198 MG/DL (ref 70–99)
GLUCOSE BLDC GLUCOMTR-MCNC: 200 MG/DL (ref 70–99)
GLUCOSE BLDC GLUCOMTR-MCNC: 201 MG/DL (ref 70–99)
GLUCOSE BLDC GLUCOMTR-MCNC: 202 MG/DL (ref 70–99)
GLUCOSE BLDC GLUCOMTR-MCNC: 203 MG/DL (ref 70–99)
GLUCOSE BLDC GLUCOMTR-MCNC: 203 MG/DL (ref 70–99)
GLUCOSE BLDC GLUCOMTR-MCNC: 204 MG/DL (ref 70–99)
GLUCOSE BLDC GLUCOMTR-MCNC: 205 MG/DL (ref 70–99)
GLUCOSE BLDC GLUCOMTR-MCNC: 206 MG/DL (ref 70–99)
GLUCOSE BLDC GLUCOMTR-MCNC: 207 MG/DL (ref 70–99)
GLUCOSE BLDC GLUCOMTR-MCNC: 209 MG/DL (ref 70–99)
GLUCOSE BLDC GLUCOMTR-MCNC: 210 MG/DL (ref 70–99)
GLUCOSE BLDC GLUCOMTR-MCNC: 211 MG/DL (ref 70–99)
GLUCOSE BLDC GLUCOMTR-MCNC: 212 MG/DL (ref 70–99)
GLUCOSE BLDC GLUCOMTR-MCNC: 213 MG/DL (ref 70–99)
GLUCOSE BLDC GLUCOMTR-MCNC: 214 MG/DL (ref 70–99)
GLUCOSE BLDC GLUCOMTR-MCNC: 217 MG/DL (ref 70–99)
GLUCOSE BLDC GLUCOMTR-MCNC: 218 MG/DL (ref 70–99)
GLUCOSE BLDC GLUCOMTR-MCNC: 221 MG/DL (ref 70–99)
GLUCOSE BLDC GLUCOMTR-MCNC: 223 MG/DL (ref 70–99)
GLUCOSE BLDC GLUCOMTR-MCNC: 223 MG/DL (ref 70–99)
GLUCOSE BLDC GLUCOMTR-MCNC: 225 MG/DL (ref 70–99)
GLUCOSE BLDC GLUCOMTR-MCNC: 225 MG/DL (ref 70–99)
GLUCOSE BLDC GLUCOMTR-MCNC: 231 MG/DL (ref 70–99)
GLUCOSE BLDC GLUCOMTR-MCNC: 231 MG/DL (ref 70–99)
GLUCOSE BLDC GLUCOMTR-MCNC: 232 MG/DL (ref 70–99)
GLUCOSE BLDC GLUCOMTR-MCNC: 233 MG/DL (ref 70–99)
GLUCOSE BLDC GLUCOMTR-MCNC: 233 MG/DL (ref 70–99)
GLUCOSE BLDC GLUCOMTR-MCNC: 235 MG/DL (ref 70–99)
GLUCOSE BLDC GLUCOMTR-MCNC: 235 MG/DL (ref 70–99)
GLUCOSE BLDC GLUCOMTR-MCNC: 239 MG/DL (ref 70–99)
GLUCOSE BLDC GLUCOMTR-MCNC: 243 MG/DL (ref 70–99)
GLUCOSE BLDC GLUCOMTR-MCNC: 251 MG/DL (ref 70–99)
GLUCOSE BLDC GLUCOMTR-MCNC: 255 MG/DL (ref 70–99)
GLUCOSE BLDC GLUCOMTR-MCNC: 266 MG/DL (ref 70–99)
GLUCOSE BLDC GLUCOMTR-MCNC: 271 MG/DL (ref 70–99)
GLUCOSE BLDC GLUCOMTR-MCNC: 35 MG/DL (ref 70–99)
GLUCOSE BLDC GLUCOMTR-MCNC: 53 MG/DL (ref 70–99)
GLUCOSE BLDC GLUCOMTR-MCNC: 64 MG/DL (ref 70–99)
GLUCOSE BLDC GLUCOMTR-MCNC: 65 MG/DL (ref 70–99)
GLUCOSE BLDC GLUCOMTR-MCNC: 67 MG/DL (ref 70–99)
GLUCOSE BLDC GLUCOMTR-MCNC: 74 MG/DL (ref 70–99)
GLUCOSE BLDC GLUCOMTR-MCNC: 77 MG/DL (ref 70–99)
GLUCOSE BLDC GLUCOMTR-MCNC: 77 MG/DL (ref 70–99)
GLUCOSE BLDC GLUCOMTR-MCNC: 80 MG/DL (ref 70–99)
GLUCOSE BLDC GLUCOMTR-MCNC: 81 MG/DL (ref 70–99)
GLUCOSE BLDC GLUCOMTR-MCNC: 82 MG/DL (ref 70–99)
GLUCOSE BLDC GLUCOMTR-MCNC: 85 MG/DL (ref 70–99)
GLUCOSE BLDC GLUCOMTR-MCNC: 89 MG/DL (ref 70–99)
GLUCOSE BLDC GLUCOMTR-MCNC: 93 MG/DL (ref 70–99)
GLUCOSE BLDC GLUCOMTR-MCNC: 93 MG/DL (ref 70–99)
GLUCOSE BLDC GLUCOMTR-MCNC: 94 MG/DL (ref 70–99)
GLUCOSE BLDC GLUCOMTR-MCNC: 94 MG/DL (ref 70–99)
GLUCOSE BLDC GLUCOMTR-MCNC: 96 MG/DL (ref 70–99)
GLUCOSE BLDC GLUCOMTR-MCNC: 99 MG/DL (ref 70–99)
GLUCOSE SERPL-MCNC: 115 MG/DL (ref 70–99)
GLUCOSE SERPL-MCNC: 125 MG/DL (ref 70–99)
GLUCOSE SERPL-MCNC: 141 MG/DL (ref 70–99)
GLUCOSE SERPL-MCNC: 154 MG/DL (ref 70–99)
GLUCOSE SERPL-MCNC: 157 MG/DL (ref 70–99)
GLUCOSE SERPL-MCNC: 158 MG/DL (ref 70–99)
GLUCOSE SERPL-MCNC: 164 MG/DL (ref 70–99)
GLUCOSE SERPL-MCNC: 169 MG/DL (ref 70–99)
GLUCOSE SERPL-MCNC: 177 MG/DL (ref 70–99)
GLUCOSE SERPL-MCNC: 178 MG/DL (ref 70–99)
GLUCOSE SERPL-MCNC: 179 MG/DL (ref 70–99)
GLUCOSE SERPL-MCNC: 190 MG/DL (ref 70–99)
GLUCOSE SERPL-MCNC: 198 MG/DL (ref 70–99)
GLUCOSE SERPL-MCNC: 198 MG/DL (ref 70–99)
GLUCOSE SERPL-MCNC: 200 MG/DL
GLUCOSE SERPL-MCNC: 200 MG/DL
GLUCOSE SERPL-MCNC: 207 MG/DL (ref 70–99)
GLUCOSE SERPL-MCNC: 61 MG/DL (ref 70–99)
GLUCOSE UR-MCNC: NEGATIVE MG/DL
GLUCOSE UR-MCNC: NEGATIVE MG/DL
HAV IGM SER QL: 2.1 MG/DL (ref 1.6–2.6)
HAV IGM SER QL: 2.2 MG/DL (ref 1.6–2.6)
HAV IGM SER QL: 2.3 MG/DL (ref 1.6–2.6)
HAV IGM SER QL: 2.4 MG/DL (ref 1.6–2.6)
HAV IGM SER QL: 2.5 MG/DL (ref 1.6–2.6)
HAV IGM SER QL: 2.5 MG/DL (ref 1.6–2.6)
HAV IGM SER QL: 3.2 MG/DL (ref 1.6–2.6)
HBA1C MFR BLD HPLC: 6.5 % (ref ?–5.7)
HBA1C MFR BLD HPLC: 6.6 % (ref ?–5.7)
HBA1C MFR BLD HPLC: 6.7 % (ref ?–5.7)
HBV SURFACE AB SER QL: NONREACTIVE
HBV SURFACE AB SERPL IA-ACNC: <3.1 MIU/ML
HBV SURFACE AG SER-ACNC: <0.1 [IU]/L
HBV SURFACE AG SERPL QL IA: NONREACTIVE
HCO3 BLDA-SCNC: 28.7 MEQ/L (ref 21–27)
HCO3 BLDA-SCNC: 28.8 MEQ/L (ref 21–27)
HCO3 BLDA-SCNC: 29.9 MEQ/L (ref 21–27)
HCO3 BLDV-SCNC: 28.5 MEQ/L (ref 22–26)
HCO3 BLDV-SCNC: 29.9 MEQ/L (ref 22–26)
HCO3 BLDV-SCNC: 30.1 MEQ/L (ref 22–26)
HCO3 BLDV-SCNC: 30.6 MEQ/L (ref 22–26)
HCO3 BLDV-SCNC: 30.9 MEQ/L (ref 22–26)
HCO3 BLDV-SCNC: 31.6 MEQ/L (ref 22–26)
HCO3 BLDV-SCNC: 31.7 MEQ/L (ref 22–26)
HCO3 BLDV-SCNC: 31.8 MEQ/L (ref 22–26)
HCO3 BLDV-SCNC: 32 MEQ/L (ref 22–26)
HCO3 BLDV-SCNC: 32 MEQ/L (ref 22–26)
HCO3 BLDV-SCNC: 32.5 MEQ/L (ref 22–26)
HCO3 BLDV-SCNC: 32.6 MEQ/L (ref 22–26)
HCO3 BLDV-SCNC: 32.8 MEQ/L (ref 22–26)
HCO3 BLDV-SCNC: 32.9 MEQ/L (ref 22–26)
HCO3 BLDV-SCNC: 33.1 MEQ/L (ref 22–26)
HCO3 BLDV-SCNC: 33.3 MEQ/L (ref 22–26)
HCO3 BLDV-SCNC: 34.1 MEQ/L (ref 22–26)
HCT VFR BLD AUTO: 28.7 % (ref 35–48)
HCT VFR BLD AUTO: 28.9 % (ref 35–48)
HCT VFR BLD AUTO: 29.1 % (ref 35–48)
HCT VFR BLD AUTO: 29.2 % (ref 35–48)
HCT VFR BLD AUTO: 29.3 % (ref 35–48)
HCT VFR BLD AUTO: 29.3 % (ref 35–48)
HCT VFR BLD AUTO: 29.4 % (ref 35–48)
HCT VFR BLD AUTO: 29.5 % (ref 35–48)
HCT VFR BLD AUTO: 29.5 % (ref 35–48)
HCT VFR BLD AUTO: 30.1 % (ref 35–48)
HCT VFR BLD AUTO: 30.8 % (ref 35–48)
HCT VFR BLD AUTO: 31 % (ref 35–48)
HCT VFR BLD AUTO: 31.2 % (ref 35–48)
HCT VFR BLD AUTO: 31.7 % (ref 35–48)
HCT VFR BLD AUTO: 31.8 % (ref 35–48)
HCT VFR BLD AUTO: 32 % (ref 35–48)
HCT VFR BLD AUTO: 32.7 % (ref 35–48)
HCT VFR BLD AUTO: 32.7 % (ref 35–48)
HCT VFR BLD AUTO: 33.5 % (ref 35–48)
HCT VFR BLD AUTO: 34.5 % (ref 35–48)
HCT VFR BLD AUTO: 34.8 % (ref 35–48)
HCT VFR BLD AUTO: 35 % (ref 35–48)
HCT VFR BLD AUTO: 35.3 % (ref 35–48)
HCT VFR BLD AUTO: 35.8 % (ref 35–48)
HCT VFR BLD AUTO: 35.9 % (ref 35–48)
HCT VFR BLD AUTO: 36.9 % (ref 35–48)
HCT VFR BLD AUTO: 37.3 % (ref 35–48)
HCT VFR BLD AUTO: 37.5 % (ref 35–48)
HCT VFR BLD AUTO: 40.2 % (ref 35–48)
HCT VFR BLD AUTO: 40.3 % (ref 35–48)
HCT VFR BLD AUTO: 43.3 % (ref 35–48)
HCT VFR BLD AUTO: 43.6 % (ref 35–48)
HCT VFR BLD AUTO: 44.3 % (ref 35–48)
HCT VFR BLD AUTO: 45.1 % (ref 35–48)
HCT VFR BLD AUTO: 45.2 % (ref 35–48)
HCT VFR BLD AUTO: 46.7 % (ref 35–48)
HCT VFR BLD CALC: 43.6 %
HCT VFR BLD CALC: 43.6 %
HDLC SERPL-MCNC: 26 MG/DL (ref 40–59)
HGB BLD-MCNC: 10.3 G/DL (ref 12–16)
HGB BLD-MCNC: 10.3 G/DL (ref 12–16)
HGB BLD-MCNC: 10.4 G/DL (ref 12–16)
HGB BLD-MCNC: 10.5 G/DL (ref 12–16)
HGB BLD-MCNC: 10.7 G/DL (ref 12–16)
HGB BLD-MCNC: 11 G/DL (ref 12–16)
HGB BLD-MCNC: 11.1 G/DL (ref 12–16)
HGB BLD-MCNC: 12.3 G/DL (ref 12–16)
HGB BLD-MCNC: 13.2 G/DL (ref 12–16)
HGB BLD-MCNC: 14 G/DL (ref 12–16)
HGB BLD-MCNC: 14.1 G/DL (ref 12–16)
HGB BLD-MCNC: 14.3 G/DL
HGB BLD-MCNC: 14.3 G/DL
HGB BLD-MCNC: 14.3 G/DL (ref 12–16)
HGB BLD-MCNC: 14.4 G/DL (ref 12–16)
HGB BLD-MCNC: 14.4 G/DL (ref 12–16)
HGB BLD-MCNC: 15.3 G/DL (ref 12–16)
HGB BLD-MCNC: 8.6 G/DL (ref 12–16)
HGB BLD-MCNC: 8.7 G/DL (ref 12–16)
HGB BLD-MCNC: 8.8 G/DL (ref 12–16)
HGB BLD-MCNC: 8.8 G/DL (ref 12–16)
HGB BLD-MCNC: 8.9 G/DL (ref 12–16)
HGB BLD-MCNC: 8.9 G/DL (ref 12–16)
HGB BLD-MCNC: 9 G/DL (ref 12–16)
HGB BLD-MCNC: 9.2 G/DL (ref 12–16)
HGB BLD-MCNC: 9.2 G/DL (ref 12–16)
HGB BLD-MCNC: 9.3 G/DL (ref 12–16)
HGB BLD-MCNC: 9.4 G/DL (ref 12–16)
HGB BLD-MCNC: 9.4 G/DL (ref 12–16)
HGB BLD-MCNC: 9.7 G/DL (ref 12–16)
HGB BLD-MCNC: 9.8 G/DL (ref 12–16)
HGB UR QL STRIP.AUTO: NEGATIVE
HYALINE CASTS #/AREA URNS AUTO: 1 /LPF
HYALINE CASTS #/AREA URNS AUTO: 63 /LPF
IMM GRANULOCYTES # BLD AUTO: 0.02 X10(3) UL (ref 0–1)
IMM GRANULOCYTES # BLD AUTO: 0.02 X10(3) UL (ref 0–1)
IMM GRANULOCYTES # BLD AUTO: 0.03 X10(3) UL (ref 0–1)
IMM GRANULOCYTES # BLD AUTO: 0.04 X10(3) UL (ref 0–1)
IMM GRANULOCYTES # BLD AUTO: 0.05 X10(3) UL (ref 0–1)
IMM GRANULOCYTES # BLD AUTO: 0.05 X10(3) UL (ref 0–1)
IMM GRANULOCYTES # BLD AUTO: 0.06 X10(3) UL (ref 0–1)
IMM GRANULOCYTES # BLD AUTO: 0.07 X10(3) UL (ref 0–1)
IMM GRANULOCYTES # BLD AUTO: 0.07 X10(3) UL (ref 0–1)
IMM GRANULOCYTES # BLD AUTO: 0.08 X10(3) UL (ref 0–1)
IMM GRANULOCYTES # BLD AUTO: 0.08 X10(3) UL (ref 0–1)
IMM GRANULOCYTES # BLD AUTO: 0.09 X10(3) UL (ref 0–1)
IMM GRANULOCYTES # BLD AUTO: 0.09 X10(3) UL (ref 0–1)
IMM GRANULOCYTES # BLD AUTO: 0.12 X10(3) UL (ref 0–1)
IMM GRANULOCYTES # BLD AUTO: 0.13 X10(3) UL (ref 0–1)
IMM GRANULOCYTES # BLD AUTO: 0.14 X10(3) UL (ref 0–1)
IMM GRANULOCYTES # BLD AUTO: 0.23 X10(3) UL (ref 0–1)
IMM GRANULOCYTES # BLD AUTO: 0.24 X10(3) UL (ref 0–1)
IMM GRANULOCYTES # BLD AUTO: 0.26 X10(3) UL (ref 0–1)
IMM GRANULOCYTES # BLD AUTO: 0.3 X10(3) UL (ref 0–1)
IMM GRANULOCYTES # BLD AUTO: 0.31 X10(3) UL (ref 0–1)
IMM GRANULOCYTES # BLD AUTO: 0.33 X10(3) UL (ref 0–1)
IMM GRANULOCYTES # BLD AUTO: 0.36 X10(3) UL (ref 0–1)
IMM GRANULOCYTES NFR BLD: 0.3 %
IMM GRANULOCYTES NFR BLD: 0.3 %
IMM GRANULOCYTES NFR BLD: 0.4 %
IMM GRANULOCYTES NFR BLD: 0.5 %
IMM GRANULOCYTES NFR BLD: 0.6 %
IMM GRANULOCYTES NFR BLD: 0.7 %
IMM GRANULOCYTES NFR BLD: 0.7 %
IMM GRANULOCYTES NFR BLD: 0.8 %
IMM GRANULOCYTES NFR BLD: 0.8 %
IMM GRANULOCYTES NFR BLD: 1 %
IMM GRANULOCYTES NFR BLD: 1.3 %
IMM GRANULOCYTES NFR BLD: 1.3 %
IMM GRANULOCYTES NFR BLD: 1.4 %
IMM GRANULOCYTES NFR BLD: 2.2 %
IMM GRANULOCYTES NFR BLD: 2.2 %
IMM GRANULOCYTES NFR BLD: 2.3 %
IMM GRANULOCYTES NFR BLD: 2.5 %
IMM GRANULOCYTES NFR BLD: 2.5 %
IMM GRANULOCYTES NFR BLD: 2.6 %
IMM GRANULOCYTES NFR BLD: 2.7 %
IMMATURE GRANULOCYTES (OFFPRE25): NORMAL
IMMATURE GRANULOCYTES (OFFPRE25): NORMAL
INR BLD: 1.4 (ref 0.9–1.2)
INR BLD: 1.4 (ref 0.9–1.2)
IRON SATN MFR SERPL: 11 % (ref 15–50)
IRON SATURATION: 16 % (ref 15–50)
IRON SERPL-MCNC: 29 MCG/DL (ref 28–170)
IRON SERPL-MCNC: 51 UG/DL (ref 50–170)
KETONES UR-MCNC: NEGATIVE MG/DL
KETONES UR-MCNC: NEGATIVE MG/DL
LACTATE SERPL-SCNC: 0.8 MMOL/L (ref 0.5–2.2)
LACTATE SERPL-SCNC: 0.9 MMOL/L (ref 0.5–2.2)
LACTATE SERPL-SCNC: 0.9 MMOL/L (ref 0.5–2.2)
LACTATE SERPL-SCNC: 1.1 MMOL/L (ref 0.5–2.2)
LDLC SERPL CALC-MCNC: 29 MG/DL (ref ?–100)
LENGTH OF FAST TIME PATIENT: NORMAL H
LENGTH OF FAST TIME PATIENT: NORMAL H
LEUKOCYTE ESTERASE UR QL STRIP.AUTO: NEGATIVE
LYMPHOCYTES # BLD AUTO: 0.37 X10(3) UL (ref 1–4)
LYMPHOCYTES # BLD AUTO: 0.44 X10(3) UL (ref 1–4)
LYMPHOCYTES # BLD AUTO: 0.5 X10(3) UL (ref 1–4)
LYMPHOCYTES # BLD AUTO: 0.51 X10(3) UL (ref 1–4)
LYMPHOCYTES # BLD AUTO: 0.56 X10(3) UL (ref 1–4)
LYMPHOCYTES # BLD AUTO: 0.57 X10(3) UL (ref 1–4)
LYMPHOCYTES # BLD AUTO: 0.57 X10(3) UL (ref 1–4)
LYMPHOCYTES # BLD AUTO: 0.58 X10(3) UL (ref 1–4)
LYMPHOCYTES # BLD AUTO: 0.58 X10(3) UL (ref 1–4)
LYMPHOCYTES # BLD AUTO: 0.59 X10(3) UL (ref 1–4)
LYMPHOCYTES # BLD AUTO: 0.63 X10(3) UL (ref 1–4)
LYMPHOCYTES # BLD AUTO: 0.65 X10(3) UL (ref 1–4)
LYMPHOCYTES # BLD AUTO: 0.66 X10(3) UL (ref 1–4)
LYMPHOCYTES # BLD AUTO: 0.68 X10(3) UL (ref 1–4)
LYMPHOCYTES # BLD AUTO: 0.71 X10(3) UL (ref 1–4)
LYMPHOCYTES # BLD AUTO: 0.73 X10(3) UL (ref 1–4)
LYMPHOCYTES # BLD AUTO: 0.74 X10(3) UL (ref 1–4)
LYMPHOCYTES # BLD AUTO: 0.79 X10(3) UL (ref 1–4)
LYMPHOCYTES # BLD AUTO: 0.8 X10(3) UL (ref 1–4)
LYMPHOCYTES # BLD AUTO: 0.81 X10(3) UL (ref 1–4)
LYMPHOCYTES # BLD AUTO: 0.84 X10(3) UL (ref 1–4)
LYMPHOCYTES # BLD AUTO: 0.85 X10(3) UL (ref 1–4)
LYMPHOCYTES # BLD AUTO: 0.85 X10(3) UL (ref 1–4)
LYMPHOCYTES # BLD AUTO: 0.86 X10(3) UL (ref 1–4)
LYMPHOCYTES # BLD AUTO: 0.91 X10(3) UL (ref 1–4)
LYMPHOCYTES # BLD AUTO: 0.95 X10(3) UL (ref 1–4)
LYMPHOCYTES # BLD AUTO: 0.97 X10(3) UL (ref 1–4)
LYMPHOCYTES # BLD: NORMAL 10*3/UL
LYMPHOCYTES # BLD: NORMAL 10*3/UL
LYMPHOCYTES NFR BLD AUTO: 11.1 %
LYMPHOCYTES NFR BLD AUTO: 11.5 %
LYMPHOCYTES NFR BLD AUTO: 11.8 %
LYMPHOCYTES NFR BLD AUTO: 3.8 %
LYMPHOCYTES NFR BLD AUTO: 4.8 %
LYMPHOCYTES NFR BLD AUTO: 5.1 %
LYMPHOCYTES NFR BLD AUTO: 5.4 %
LYMPHOCYTES NFR BLD AUTO: 5.4 %
LYMPHOCYTES NFR BLD AUTO: 5.5 %
LYMPHOCYTES NFR BLD AUTO: 5.5 %
LYMPHOCYTES NFR BLD AUTO: 5.6 %
LYMPHOCYTES NFR BLD AUTO: 6 %
LYMPHOCYTES NFR BLD AUTO: 6.1 %
LYMPHOCYTES NFR BLD AUTO: 6.3 %
LYMPHOCYTES NFR BLD AUTO: 6.3 %
LYMPHOCYTES NFR BLD AUTO: 6.5 %
LYMPHOCYTES NFR BLD AUTO: 6.5 %
LYMPHOCYTES NFR BLD AUTO: 6.7 %
LYMPHOCYTES NFR BLD AUTO: 7.2 %
LYMPHOCYTES NFR BLD AUTO: 7.3 %
LYMPHOCYTES NFR BLD AUTO: 7.5 %
LYMPHOCYTES NFR BLD AUTO: 7.7 %
LYMPHOCYTES NFR BLD AUTO: 7.9 %
LYMPHOCYTES NFR BLD AUTO: 8 %
LYMPHOCYTES NFR BLD AUTO: 8.2 %
LYMPHOCYTES NFR BLD AUTO: 8.3 %
LYMPHOCYTES NFR BLD AUTO: 8.5 %
LYMPHOCYTES NFR BLD AUTO: 8.5 %
LYMPHOCYTES NFR BLD AUTO: 8.6 %
LYMPHOCYTES NFR BLD AUTO: 9 %
LYMPHOCYTES NFR BLD AUTO: 9.9 %
LYMPHOCYTES NFR BLD: 1.42 X10(3) UL (ref 1–4)
LYMPHOCYTES NFR BLD: 13 %
LYMPHOCYTES NFR BLD: NORMAL %
LYMPHOCYTES NFR BLD: NORMAL %
LYMPHOCYTES NFR FLD: 36 %
M PROTEIN MFR SERPL ELPH: 7 G/DL (ref 6.4–8.2)
M PROTEIN MFR SERPL ELPH: 7.3 G/DL (ref 6.4–8.2)
MAGNESIUM SERPL-MCNC: 1.9 MG/DL (ref 1.8–2.5)
MAGNESIUM SERPL-MCNC: 2.1 MG/DL (ref 1.8–2.5)
MAGNESIUM SERPL-MCNC: 2.3 MG/DL (ref 1.8–2.5)
MAGNESIUM SERPL-MCNC: 2.3 MG/DL (ref 1.8–2.5)
MAGNESIUM SERPL-MCNC: 2.5 MG/DL (ref 1.8–2.5)
MAGNESIUM SERPL-MCNC: 2.5 MG/DL (ref 1.8–2.5)
MAGNESIUM SERPL-MCNC: 2.6 MG/DL (ref 1.8–2.5)
MCH RBC QN AUTO: 28.2 PG (ref 26–34)
MCH RBC QN AUTO: 28.3 PG (ref 26–34)
MCH RBC QN AUTO: 28.4 PG (ref 26–34)
MCH RBC QN AUTO: 28.5 PG (ref 26–34)
MCH RBC QN AUTO: 28.6 PG (ref 26–34)
MCH RBC QN AUTO: 28.7 PG (ref 26–34)
MCH RBC QN AUTO: 28.7 PG (ref 26–34)
MCH RBC QN AUTO: 28.8 PG (ref 26–34)
MCH RBC QN AUTO: 28.9 PG (ref 26–34)
MCH RBC QN AUTO: 28.9 PG (ref 26–34)
MCH RBC QN AUTO: 29 PG (ref 26–34)
MCH RBC QN AUTO: 29.1 PG (ref 26–34)
MCH RBC QN AUTO: 29.1 PG (ref 26–34)
MCH RBC QN AUTO: 29.2 PG (ref 26–34)
MCH RBC QN AUTO: 29.6 PG (ref 26–34)
MCH RBC QN AUTO: 29.6 PG (ref 26–34)
MCH RBC QN AUTO: 29.7 PG (ref 26–34)
MCH RBC QN AUTO: 29.8 PG (ref 26–34)
MCH RBC QN AUTO: 31.3 PG (ref 26–34)
MCH RBC QN AUTO: 31.5 PG (ref 26–34)
MCH RBC QN AUTO: 31.7 PG (ref 26–34)
MCH RBC QN AUTO: 31.8 PG (ref 26–34)
MCH RBC QN AUTO: 31.8 PG (ref 26–34)
MCH RBC QN AUTO: 31.9 PG (ref 26–34)
MCH RBC QN AUTO: 32 PG (ref 26–34)
MCH RBC QN AUTO: 32.3 PG (ref 26–34)
MCH RBC QN AUTO: NORMAL PG
MCH RBC QN AUTO: NORMAL PG
MCHC RBC AUTO-ENTMCNC: 28.2 G/DL (ref 31–37)
MCHC RBC AUTO-ENTMCNC: 29 G/DL (ref 31–37)
MCHC RBC AUTO-ENTMCNC: 29.1 G/DL (ref 31–37)
MCHC RBC AUTO-ENTMCNC: 29.2 G/DL (ref 31–37)
MCHC RBC AUTO-ENTMCNC: 29.4 G/DL (ref 31–37)
MCHC RBC AUTO-ENTMCNC: 29.6 G/DL (ref 31–37)
MCHC RBC AUTO-ENTMCNC: 29.7 G/DL (ref 31–37)
MCHC RBC AUTO-ENTMCNC: 29.8 G/DL (ref 31–37)
MCHC RBC AUTO-ENTMCNC: 30 G/DL (ref 31–37)
MCHC RBC AUTO-ENTMCNC: 30.1 G/DL (ref 31–37)
MCHC RBC AUTO-ENTMCNC: 30.1 G/DL (ref 31–37)
MCHC RBC AUTO-ENTMCNC: 30.2 G/DL (ref 31–37)
MCHC RBC AUTO-ENTMCNC: 30.3 G/DL (ref 31–37)
MCHC RBC AUTO-ENTMCNC: 30.4 G/DL (ref 31–37)
MCHC RBC AUTO-ENTMCNC: 30.5 G/DL (ref 31–37)
MCHC RBC AUTO-ENTMCNC: 30.6 G/DL (ref 31–37)
MCHC RBC AUTO-ENTMCNC: 30.6 G/DL (ref 31–37)
MCHC RBC AUTO-ENTMCNC: 31.2 G/DL (ref 31–37)
MCHC RBC AUTO-ENTMCNC: 31.9 G/DL (ref 31–37)
MCHC RBC AUTO-ENTMCNC: 32.3 G/DL (ref 31–37)
MCHC RBC AUTO-ENTMCNC: 32.5 G/DL (ref 31–37)
MCHC RBC AUTO-ENTMCNC: 32.8 G/DL (ref 31–37)
MCHC RBC AUTO-ENTMCNC: NORMAL G/DL
MCHC RBC AUTO-ENTMCNC: NORMAL G/DL
MCV RBC AUTO: 100 FL (ref 80–100)
MCV RBC AUTO: 100.3 FL (ref 80–100)
MCV RBC AUTO: 100.8 FL (ref 80–100)
MCV RBC AUTO: 101.1 FL (ref 80–100)
MCV RBC AUTO: 103.9 FL (ref 80–100)
MCV RBC AUTO: 93.7 FL (ref 80–100)
MCV RBC AUTO: 94.2 FL (ref 80–100)
MCV RBC AUTO: 94.4 FL (ref 80–100)
MCV RBC AUTO: 94.4 FL (ref 80–100)
MCV RBC AUTO: 94.5 FL (ref 80–100)
MCV RBC AUTO: 94.8 FL (ref 80–100)
MCV RBC AUTO: 95 FL (ref 80–100)
MCV RBC AUTO: 95.2 FL (ref 80–100)
MCV RBC AUTO: 95.3 FL (ref 80–100)
MCV RBC AUTO: 95.4 FL (ref 80–100)
MCV RBC AUTO: 96 FL (ref 80–100)
MCV RBC AUTO: 96.4 FL (ref 80–100)
MCV RBC AUTO: 96.6 FL (ref 80–100)
MCV RBC AUTO: 96.7 FL (ref 80–100)
MCV RBC AUTO: 97 FL (ref 80–100)
MCV RBC AUTO: 97 FL (ref 80–100)
MCV RBC AUTO: 97.1 FL (ref 80–100)
MCV RBC AUTO: 97.1 FL (ref 80–100)
MCV RBC AUTO: 97.2 FL (ref 80–100)
MCV RBC AUTO: 97.3 FL (ref 80–100)
MCV RBC AUTO: 97.7 FL (ref 80–100)
MCV RBC AUTO: 98 FL (ref 80–100)
MCV RBC AUTO: 98 FL (ref 80–100)
MCV RBC AUTO: 98.1 FL (ref 80–100)
MCV RBC AUTO: 98.1 FL (ref 80–100)
MCV RBC AUTO: 98.3 FL (ref 80–100)
MCV RBC AUTO: 98.5 FL (ref 80–100)
MCV RBC AUTO: 98.9 FL (ref 80–100)
MCV RBC AUTO: 99.2 FL (ref 80–100)
MCV RBC AUTO: 99.3 FL (ref 80–100)
MCV RBC AUTO: 99.4 FL (ref 80–100)
MCV RBC AUTO: NORMAL FL
MCV RBC AUTO: NORMAL FL
MICROALBUMIN UR-MCNC: 10.2 MG/DL (ref 0–1.8)
MICROALBUMIN/CREAT UR: 172.3 MG/G{CREAT} (ref 0–20)
MODIFIED ALLEN TEST: POSITIVE
MONOCYTES # BLD AUTO: 0.53 X10(3) UL (ref 0.1–1)
MONOCYTES # BLD AUTO: 0.69 X10(3) UL (ref 0.1–1)
MONOCYTES # BLD AUTO: 0.71 X10(3) UL (ref 0.1–1)
MONOCYTES # BLD AUTO: 0.74 X10(3) UL (ref 0.1–1)
MONOCYTES # BLD AUTO: 0.74 X10(3) UL (ref 0.1–1)
MONOCYTES # BLD AUTO: 0.79 X10(3) UL (ref 0.1–1)
MONOCYTES # BLD AUTO: 0.83 X10(3) UL (ref 0.1–1)
MONOCYTES # BLD AUTO: 0.83 X10(3) UL (ref 0.1–1)
MONOCYTES # BLD AUTO: 0.85 X10(3) UL (ref 0.1–1)
MONOCYTES # BLD AUTO: 0.85 X10(3) UL (ref 0.1–1)
MONOCYTES # BLD AUTO: 0.87 X10(3) UL (ref 0.1–1)
MONOCYTES # BLD AUTO: 0.88 X10(3) UL (ref 0.1–1)
MONOCYTES # BLD AUTO: 0.91 X10(3) UL (ref 0.1–1)
MONOCYTES # BLD AUTO: 0.91 X10(3) UL (ref 0.1–1)
MONOCYTES # BLD AUTO: 0.95 X10(3) UL (ref 0.1–1)
MONOCYTES # BLD AUTO: 0.96 X10(3) UL (ref 0.1–1)
MONOCYTES # BLD AUTO: 0.99 X10(3) UL (ref 0.1–1)
MONOCYTES # BLD AUTO: 1.01 X10(3) UL (ref 0.1–1)
MONOCYTES # BLD AUTO: 1.03 X10(3) UL (ref 0.1–1)
MONOCYTES # BLD AUTO: 1.04 X10(3) UL (ref 0.1–1)
MONOCYTES # BLD AUTO: 1.05 X10(3) UL (ref 0.1–1)
MONOCYTES # BLD AUTO: 1.07 X10(3) UL (ref 0.1–1)
MONOCYTES # BLD AUTO: 1.08 X10(3) UL (ref 0.1–1)
MONOCYTES # BLD AUTO: 1.1 X10(3) UL (ref 0.1–1)
MONOCYTES # BLD AUTO: 1.12 X10(3) UL (ref 0.1–1)
MONOCYTES # BLD AUTO: 1.13 X10(3) UL (ref 0.1–1)
MONOCYTES # BLD AUTO: 1.15 X10(3) UL (ref 0.1–1)
MONOCYTES # BLD AUTO: 1.19 X10(3) UL (ref 0.1–1)
MONOCYTES # BLD AUTO: 1.29 X10(3) UL (ref 0.1–1)
MONOCYTES # BLD: 0.76 X10(3) UL (ref 0.1–1)
MONOCYTES # BLD: NORMAL 10*3/UL
MONOCYTES # BLD: NORMAL 10*3/UL
MONOCYTES NFR BLD AUTO: 10.2 %
MONOCYTES NFR BLD AUTO: 10.2 %
MONOCYTES NFR BLD AUTO: 10.3 %
MONOCYTES NFR BLD AUTO: 10.7 %
MONOCYTES NFR BLD AUTO: 10.8 %
MONOCYTES NFR BLD AUTO: 10.8 %
MONOCYTES NFR BLD AUTO: 10.9 %
MONOCYTES NFR BLD AUTO: 11 %
MONOCYTES NFR BLD AUTO: 11.1 %
MONOCYTES NFR BLD AUTO: 11.4 %
MONOCYTES NFR BLD AUTO: 11.4 %
MONOCYTES NFR BLD AUTO: 11.6 %
MONOCYTES NFR BLD AUTO: 11.8 %
MONOCYTES NFR BLD AUTO: 12 %
MONOCYTES NFR BLD AUTO: 7.1 %
MONOCYTES NFR BLD AUTO: 7.5 %
MONOCYTES NFR BLD AUTO: 7.6 %
MONOCYTES NFR BLD AUTO: 8.3 %
MONOCYTES NFR BLD AUTO: 8.3 %
MONOCYTES NFR BLD AUTO: 8.4 %
MONOCYTES NFR BLD AUTO: 8.6 %
MONOCYTES NFR BLD AUTO: 8.7 %
MONOCYTES NFR BLD AUTO: 8.7 %
MONOCYTES NFR BLD AUTO: 9 %
MONOCYTES NFR BLD AUTO: 9.3 %
MONOCYTES NFR BLD AUTO: 9.4 %
MONOCYTES NFR BLD AUTO: 9.5 %
MONOCYTES NFR BLD AUTO: 9.5 %
MONOCYTES NFR BLD AUTO: 9.6 %
MONOCYTES NFR BLD AUTO: 9.7 %
MONOCYTES NFR BLD AUTO: 9.8 %
MONOCYTES NFR BLD: 7 %
MONOCYTES NFR BLD: NORMAL %
MONOCYTES NFR BLD: NORMAL %
MONOCYTES NFR FLD: 62 %
MPV (OFFPRE2): NORMAL
MPV (OFFPRE2): NORMAL
MRSA DNA SPEC QL NAA+PROBE: NEGATIVE
NEUTROPHILS # BLD AUTO: 11.05 X10 (3) UL (ref 1.5–7.7)
NEUTROPHILS # BLD AUTO: 11.05 X10(3) UL (ref 1.5–7.7)
NEUTROPHILS # BLD AUTO: 11.06 X10 (3) UL (ref 1.5–7.7)
NEUTROPHILS # BLD AUTO: 11.06 X10(3) UL (ref 1.5–7.7)
NEUTROPHILS # BLD AUTO: 4.79 X10 (3) UL (ref 1.5–7.7)
NEUTROPHILS # BLD AUTO: 4.79 X10(3) UL (ref 1.5–7.7)
NEUTROPHILS # BLD AUTO: 5.15 X10 (3) UL (ref 1.5–7.7)
NEUTROPHILS # BLD AUTO: 5.15 X10(3) UL (ref 1.5–7.7)
NEUTROPHILS # BLD AUTO: 5.39 X10 (3) UL (ref 1.5–7.7)
NEUTROPHILS # BLD AUTO: 5.39 X10(3) UL (ref 1.5–7.7)
NEUTROPHILS # BLD AUTO: 5.88 X10 (3) UL (ref 1.5–7.7)
NEUTROPHILS # BLD AUTO: 5.88 X10(3) UL (ref 1.5–7.7)
NEUTROPHILS # BLD AUTO: 6.3 X10 (3) UL (ref 1.5–7.7)
NEUTROPHILS # BLD AUTO: 6.3 X10(3) UL (ref 1.5–7.7)
NEUTROPHILS # BLD AUTO: 6.38 X10 (3) UL (ref 1.5–7.7)
NEUTROPHILS # BLD AUTO: 6.38 X10(3) UL (ref 1.5–7.7)
NEUTROPHILS # BLD AUTO: 6.43 X10 (3) UL (ref 1.5–7.7)
NEUTROPHILS # BLD AUTO: 6.43 X10(3) UL (ref 1.5–7.7)
NEUTROPHILS # BLD AUTO: 6.67 X10 (3) UL (ref 1.5–7.7)
NEUTROPHILS # BLD AUTO: 6.67 X10(3) UL (ref 1.5–7.7)
NEUTROPHILS # BLD AUTO: 7 X10 (3) UL (ref 1.5–7.7)
NEUTROPHILS # BLD AUTO: 7 X10(3) UL (ref 1.5–7.7)
NEUTROPHILS # BLD AUTO: 7.03 X10 (3) UL (ref 1.5–7.7)
NEUTROPHILS # BLD AUTO: 7.03 X10(3) UL (ref 1.5–7.7)
NEUTROPHILS # BLD AUTO: 7.35 X10 (3) UL (ref 1.5–7.7)
NEUTROPHILS # BLD AUTO: 7.35 X10(3) UL (ref 1.5–7.7)
NEUTROPHILS # BLD AUTO: 7.36 X10 (3) UL (ref 1.5–7.7)
NEUTROPHILS # BLD AUTO: 7.36 X10(3) UL (ref 1.5–7.7)
NEUTROPHILS # BLD AUTO: 7.83 X10 (3) UL (ref 1.5–7.7)
NEUTROPHILS # BLD AUTO: 7.83 X10(3) UL (ref 1.5–7.7)
NEUTROPHILS # BLD AUTO: 7.98 X10 (3) UL (ref 1.5–7.7)
NEUTROPHILS # BLD AUTO: 7.98 X10(3) UL (ref 1.5–7.7)
NEUTROPHILS # BLD AUTO: 8.05 X10 (3) UL (ref 1.5–7.7)
NEUTROPHILS # BLD AUTO: 8.05 X10(3) UL (ref 1.5–7.7)
NEUTROPHILS # BLD AUTO: 8.06 X10 (3) UL (ref 1.5–7.7)
NEUTROPHILS # BLD AUTO: 8.06 X10(3) UL (ref 1.5–7.7)
NEUTROPHILS # BLD AUTO: 8.1 X10 (3) UL (ref 1.5–7.7)
NEUTROPHILS # BLD AUTO: 8.1 X10(3) UL (ref 1.5–7.7)
NEUTROPHILS # BLD AUTO: 8.14 X10 (3) UL (ref 1.5–7.7)
NEUTROPHILS # BLD AUTO: 8.14 X10(3) UL (ref 1.5–7.7)
NEUTROPHILS # BLD AUTO: 8.2 X10 (3) UL (ref 1.5–7.7)
NEUTROPHILS # BLD AUTO: 8.2 X10(3) UL (ref 1.5–7.7)
NEUTROPHILS # BLD AUTO: 8.32 X10 (3) UL (ref 1.5–7.7)
NEUTROPHILS # BLD AUTO: 8.32 X10(3) UL (ref 1.5–7.7)
NEUTROPHILS # BLD AUTO: 8.39 X10 (3) UL (ref 1.5–7.7)
NEUTROPHILS # BLD AUTO: 8.39 X10(3) UL (ref 1.5–7.7)
NEUTROPHILS # BLD AUTO: 8.4 X10 (3) UL (ref 1.5–7.7)
NEUTROPHILS # BLD AUTO: 8.4 X10(3) UL (ref 1.5–7.7)
NEUTROPHILS # BLD AUTO: 8.58 X10 (3) UL (ref 1.5–7.7)
NEUTROPHILS # BLD AUTO: 8.61 X10 (3) UL (ref 1.5–7.7)
NEUTROPHILS # BLD AUTO: 8.61 X10(3) UL (ref 1.5–7.7)
NEUTROPHILS # BLD AUTO: 8.72 X10 (3) UL (ref 1.5–7.7)
NEUTROPHILS # BLD AUTO: 8.72 X10(3) UL (ref 1.5–7.7)
NEUTROPHILS # BLD AUTO: 8.92 X10 (3) UL (ref 1.5–7.7)
NEUTROPHILS # BLD AUTO: 8.92 X10(3) UL (ref 1.5–7.7)
NEUTROPHILS # BLD AUTO: 9.41 X10 (3) UL (ref 1.5–7.7)
NEUTROPHILS # BLD AUTO: 9.41 X10(3) UL (ref 1.5–7.7)
NEUTROPHILS # BLD AUTO: 9.42 X10 (3) UL (ref 1.5–7.7)
NEUTROPHILS # BLD AUTO: 9.42 X10(3) UL (ref 1.5–7.7)
NEUTROPHILS # BLD AUTO: 9.82 X10 (3) UL (ref 1.5–7.7)
NEUTROPHILS # BLD AUTO: 9.82 X10(3) UL (ref 1.5–7.7)
NEUTROPHILS # BLD AUTO: 9.83 X10 (3) UL (ref 1.5–7.7)
NEUTROPHILS # BLD AUTO: 9.83 X10(3) UL (ref 1.5–7.7)
NEUTROPHILS # BLD: NORMAL 10*3/UL
NEUTROPHILS # BLD: NORMAL 10*3/UL
NEUTROPHILS NFR BLD AUTO: 74.9 %
NEUTROPHILS NFR BLD AUTO: 76.1 %
NEUTROPHILS NFR BLD AUTO: 77.3 %
NEUTROPHILS NFR BLD AUTO: 77.6 %
NEUTROPHILS NFR BLD AUTO: 78.5 %
NEUTROPHILS NFR BLD AUTO: 78.6 %
NEUTROPHILS NFR BLD AUTO: 79.2 %
NEUTROPHILS NFR BLD AUTO: 79.2 %
NEUTROPHILS NFR BLD AUTO: 79.4 %
NEUTROPHILS NFR BLD AUTO: 79.5 %
NEUTROPHILS NFR BLD AUTO: 79.5 %
NEUTROPHILS NFR BLD AUTO: 79.7 %
NEUTROPHILS NFR BLD AUTO: 79.8 %
NEUTROPHILS NFR BLD AUTO: 80 %
NEUTROPHILS NFR BLD AUTO: 80 %
NEUTROPHILS NFR BLD AUTO: 80.4 %
NEUTROPHILS NFR BLD AUTO: 80.9 %
NEUTROPHILS NFR BLD AUTO: 81.1 %
NEUTROPHILS NFR BLD AUTO: 81.6 %
NEUTROPHILS NFR BLD AUTO: 81.6 %
NEUTROPHILS NFR BLD AUTO: 81.7 %
NEUTROPHILS NFR BLD AUTO: 81.7 %
NEUTROPHILS NFR BLD AUTO: 81.9 %
NEUTROPHILS NFR BLD AUTO: 82.2 %
NEUTROPHILS NFR BLD AUTO: 82.2 %
NEUTROPHILS NFR BLD AUTO: 83 %
NEUTROPHILS NFR BLD AUTO: 83 %
NEUTROPHILS NFR BLD AUTO: 83.1 %
NEUTROPHILS NFR BLD AUTO: 83.9 %
NEUTROPHILS NFR BLD AUTO: 84.5 %
NEUTROPHILS NFR BLD AUTO: 85.6 %
NEUTROPHILS NFR BLD: 71 %
NEUTROPHILS NFR BLD: NORMAL %
NEUTROPHILS NFR BLD: NORMAL %
NEUTROPHILS NFR FLD: 1 %
NEUTS BAND NFR BLD: 8 %
NEUTS HYPERSEG # BLD: 8.61 X10(3) UL (ref 1.5–7.7)
NITRITE UR QL STRIP.AUTO: NEGATIVE
NITRITE UR QL STRIP.AUTO: NEGATIVE
NONHDLC SERPL-MCNC: 54 MG/DL (ref ?–130)
NRBC BLD MANUAL-RTO: 2 %
NRBC BLD MANUAL-RTO: NORMAL %
NRBC BLD MANUAL-RTO: NORMAL %
NT-PROBNP SERPL-MCNC: ABNORMAL PG/ML (ref ?–125)
O2 CT BLD-SCNC: 14 VOL% (ref 15–23)
O2 CT BLD-SCNC: 14.3 VOL% (ref 15–23)
O2 CT BLD-SCNC: 14.6 VOL% (ref 15–23)
O2/TOTAL GAS SETTING VFR VENT: 40 %
O2/TOTAL GAS SETTING VFR VENT: 50 %
O2/TOTAL GAS SETTING VFR VENT: 50 %
OSMOLALITY SERPL CALC.SUM OF ELEC: 281 MOSM/KG (ref 275–295)
OSMOLALITY SERPL CALC.SUM OF ELEC: 284 MOSM/KG (ref 275–295)
OSMOLALITY SERPL CALC.SUM OF ELEC: 285 MOSM/KG (ref 275–295)
OSMOLALITY SERPL CALC.SUM OF ELEC: 285 MOSM/KG (ref 275–295)
OSMOLALITY SERPL CALC.SUM OF ELEC: 286 MOSM/KG (ref 275–295)
OSMOLALITY SERPL CALC.SUM OF ELEC: 287 MOSM/KG (ref 275–295)
OSMOLALITY SERPL CALC.SUM OF ELEC: 287 MOSM/KG (ref 275–295)
OSMOLALITY SERPL CALC.SUM OF ELEC: 288 MOSM/KG (ref 275–295)
OSMOLALITY SERPL CALC.SUM OF ELEC: 289 MOSM/KG (ref 275–295)
OSMOLALITY SERPL CALC.SUM OF ELEC: 289 MOSM/KG (ref 275–295)
OSMOLALITY SERPL CALC.SUM OF ELEC: 290 MOSM/KG (ref 275–295)
OSMOLALITY SERPL CALC.SUM OF ELEC: 291 MOSM/KG (ref 275–295)
OSMOLALITY SERPL CALC.SUM OF ELEC: 291 MOSM/KG (ref 275–295)
OSMOLALITY SERPL CALC.SUM OF ELEC: 294 MOSM/KG (ref 275–295)
OSMOLALITY SERPL CALC.SUM OF ELEC: 295 MOSM/KG (ref 275–295)
OSMOLALITY SERPL CALC.SUM OF ELEC: 295 MOSM/KG (ref 275–295)
OSMOLALITY SERPL CALC.SUM OF ELEC: 297 MOSM/KG (ref 275–295)
OSMOLALITY SERPL CALC.SUM OF ELEC: 300 MOSM/KG (ref 275–295)
OSMOLALITY SERPL CALC.SUM OF ELEC: 300 MOSM/KG (ref 275–295)
OSMOLALITY SERPL CALC.SUM OF ELEC: 306 MOSM/KG (ref 275–295)
OSMOLALITY SERPL CALC.SUM OF ELEC: 306 MOSM/KG (ref 275–295)
OSMOLALITY SERPL CALC.SUM OF ELEC: 308 MOSM/KG (ref 275–295)
OSMOLALITY UR CALC.SUM OF ELEC: 300 MOSM/KG (ref 275–295)
OSMOLALITY UR CALC.SUM OF ELEC: 300 MOSM/KG (ref 275–295)
OSMOLALITY UR CALC.SUM OF ELEC: 304 MOSM/KG (ref 275–295)
OSMOLALITY UR CALC.SUM OF ELEC: 311 MOSM/KG (ref 275–295)
OSMOLALITY UR CALC.SUM OF ELEC: 316 MOSM/KG (ref 275–295)
OSMOLALITY UR CALC.SUM OF ELEC: 316 MOSM/KG (ref 275–295)
OSMOLALITY UR CALC.SUM OF ELEC: 320 MOSM/KG (ref 275–295)
OSMOLALITY UR CALC.SUM OF ELEC: 321 MOSM/KG (ref 275–295)
OSMOLALITY UR CALC.SUM OF ELEC: 322 MOSM/KG (ref 275–295)
OSMOLALITY UR CALC.SUM OF ELEC: 323 MOSM/KG (ref 275–295)
OSMOLALITY UR CALC.SUM OF ELEC: 325 MOSM/KG (ref 275–295)
OSMOLALITY UR CALC.SUM OF ELEC: 326 MOSM/KG (ref 275–295)
OSMOLALITY UR CALC.SUM OF ELEC: 331 MOSM/KG (ref 275–295)
OSMOLALITY UR CALC.SUM OF ELEC: 332 MOSM/KG (ref 275–295)
OSMOLALITY UR CALC.SUM OF ELEC: 333 MOSM/KG (ref 275–295)
OSMOLALITY UR CALC.SUM OF ELEC: 333 MOSM/KG (ref 275–295)
OXYGEN LITERS/MINUTE: 2 L/MIN
OXYGEN LITERS/MINUTE: 3 L/MIN
OXYGEN LITERS/MINUTE: 4 L/MIN
OXYGEN LITERS/MINUTE: 5 L/MIN
OXYGEN LITERS/MINUTE: 6 L/MIN
OXYGEN LITERS/MINUTE: 8 L/MIN
PATIENT FASTING: NO
PATIENT FASTING: YES
PCO2 BLDA: 69 MM HG (ref 35–45)
PCO2 BLDA: 71 MM HG (ref 35–45)
PCO2 BLDA: 77 MM HG (ref 35–45)
PCO2 BLDV: 54 MM HG (ref 38–50)
PCO2 BLDV: 56 MM HG (ref 38–50)
PCO2 BLDV: 59 MM HG (ref 38–50)
PCO2 BLDV: 60 MM HG (ref 38–50)
PCO2 BLDV: 61 MM HG (ref 38–50)
PCO2 BLDV: 62 MM HG (ref 38–50)
PCO2 BLDV: 62 MM HG (ref 38–50)
PCO2 BLDV: 63 MM HG (ref 38–50)
PCO2 BLDV: 64 MM HG (ref 38–50)
PCO2 BLDV: 65 MM HG (ref 38–50)
PCO2 BLDV: 68 MM HG (ref 38–50)
PCO2 BLDV: 72 MM HG (ref 38–50)
PCO2 BLDV: 74 MM HG (ref 38–50)
PCO2 BLDV: 85 MM HG (ref 38–50)
PH BLDA: 7.27 [PH] (ref 7.35–7.45)
PH BLDA: 7.28 [PH] (ref 7.35–7.45)
PH BLDA: 7.29 [PH] (ref 7.35–7.45)
PH BLDV: 7.23 [PH] (ref 7.32–7.43)
PH BLDV: 7.3 [PH] (ref 7.32–7.43)
PH BLDV: 7.31 [PH] (ref 7.32–7.43)
PH BLDV: 7.34 [PH] (ref 7.32–7.43)
PH BLDV: 7.36 [PH] (ref 7.32–7.43)
PH BLDV: 7.37 [PH] (ref 7.32–7.43)
PH BLDV: 7.38 [PH] (ref 7.32–7.43)
PH BLDV: 7.39 [PH] (ref 7.32–7.43)
PH BLDV: 7.4 [PH] (ref 7.32–7.43)
PH BLDV: 7.4 [PH] (ref 7.32–7.43)
PH BLDV: 7.41 [PH] (ref 7.32–7.43)
PH BLDV: 7.42 [PH] (ref 7.32–7.43)
PH BLDV: 7.43 [PH] (ref 7.32–7.43)
PH BLDV: 7.43 [PH] (ref 7.32–7.43)
PH BLDV: 7.44 [PH] (ref 7.32–7.43)
PH UR: 5 [PH] (ref 5–8)
PH UR: 5 [PH] (ref 5–8)
PHOSPHATE SERPL-MCNC: 2.9 MG/DL (ref 2.4–4.7)
PHOSPHATE SERPL-MCNC: 2.9 MG/DL (ref 2.5–4.9)
PHOSPHATE SERPL-MCNC: 3.1 MG/DL (ref 2.4–4.7)
PHOSPHATE SERPL-MCNC: 3.2 MG/DL (ref 2.4–4.7)
PHOSPHATE SERPL-MCNC: 3.2 MG/DL (ref 2.4–4.7)
PHOSPHATE SERPL-MCNC: 3.3 MG/DL (ref 2.4–4.7)
PHOSPHATE SERPL-MCNC: 3.3 MG/DL (ref 2.5–4.9)
PHOSPHATE SERPL-MCNC: 3.4 MG/DL (ref 2.4–4.7)
PHOSPHATE SERPL-MCNC: 3.5 MG/DL (ref 2.4–4.7)
PHOSPHATE SERPL-MCNC: 3.5 MG/DL (ref 2.5–4.9)
PHOSPHATE SERPL-MCNC: 3.5 MG/DL (ref 2.5–4.9)
PHOSPHATE SERPL-MCNC: 3.6 MG/DL (ref 2.5–4.9)
PHOSPHATE SERPL-MCNC: 3.7 MG/DL (ref 2.5–4.9)
PHOSPHATE SERPL-MCNC: 3.9 MG/DL (ref 2.5–4.9)
PHOSPHATE SERPL-MCNC: 5.8 MG/DL (ref 2.5–4.9)
PHOSPHATE SERPL-MCNC: 5.8 MG/DL (ref 2.5–4.9)
PHOSPHATE SERPL-MCNC: 6.1 MG/DL (ref 2.5–4.9)
PHOSPHATE SERPL-MCNC: 6.5 MG/DL (ref 2.4–4.7)
PHOSPHATE SERPL-MCNC: 6.6 MG/DL (ref 2.5–4.9)
PHOSPHATE SERPL-MCNC: 6.8 MG/DL (ref 2.4–4.7)
PHOSPHATE SERPL-MCNC: 7.3 MG/DL (ref 2.5–4.9)
PHOSPHATE SERPL-MCNC: 7.5 MG/DL (ref 2.4–4.7)
PHOSPHATE SERPL-MCNC: 8.4 MG/DL (ref 2.5–4.9)
PLAT MORPH BLD: NORMAL
PLAT MORPH BLD: NORMAL
PLATELET # BLD AUTO: 100 10(3)UL (ref 150–450)
PLATELET # BLD AUTO: 101 10(3)UL (ref 150–450)
PLATELET # BLD AUTO: 114 10(3)UL (ref 150–450)
PLATELET # BLD AUTO: 120 10(3)UL (ref 150–450)
PLATELET # BLD AUTO: 120 10(3)UL (ref 150–450)
PLATELET # BLD AUTO: 123 10(3)UL (ref 150–450)
PLATELET # BLD AUTO: 125 10(3)UL (ref 150–450)
PLATELET # BLD AUTO: 125 10(3)UL (ref 150–450)
PLATELET # BLD AUTO: 127 10(3)UL (ref 150–450)
PLATELET # BLD AUTO: 129 10(3)UL (ref 150–450)
PLATELET # BLD AUTO: 132 10(3)UL (ref 150–450)
PLATELET # BLD AUTO: 133 10(3)UL (ref 150–450)
PLATELET # BLD AUTO: 142 10(3)UL (ref 150–450)
PLATELET # BLD AUTO: 149 10(3)UL (ref 150–450)
PLATELET # BLD AUTO: 150 10(3)UL (ref 150–450)
PLATELET # BLD AUTO: 155 10(3)UL (ref 150–450)
PLATELET # BLD AUTO: 158 10(3)UL (ref 150–450)
PLATELET # BLD AUTO: 163 10(3)UL (ref 150–450)
PLATELET # BLD AUTO: 166 10(3)UL (ref 150–450)
PLATELET # BLD AUTO: 169 10(3)UL (ref 150–450)
PLATELET # BLD AUTO: 174 10(3)UL (ref 150–450)
PLATELET # BLD AUTO: 174 10(3)UL (ref 150–450)
PLATELET # BLD AUTO: 224 10(3)UL (ref 150–450)
PLATELET # BLD AUTO: 244 10(3)UL (ref 150–450)
PLATELET # BLD AUTO: 293 10(3)UL (ref 150–450)
PLATELET # BLD AUTO: 312 10(3)UL (ref 150–450)
PLATELET # BLD AUTO: 322 10(3)UL (ref 150–450)
PLATELET # BLD AUTO: 334 10(3)UL (ref 150–450)
PLATELET # BLD AUTO: 352 10(3)UL (ref 150–450)
PLATELET # BLD AUTO: 358 10(3)UL (ref 150–450)
PLATELET # BLD AUTO: 394 10(3)UL (ref 150–450)
PLATELET # BLD AUTO: 406 10(3)UL (ref 150–450)
PLATELET # BLD AUTO: 428 10(3)UL (ref 150–450)
PLATELET # BLD AUTO: 433 10(3)UL (ref 150–450)
PLATELET # BLD AUTO: 84 10(3)UL (ref 150–450)
PLATELET # BLD AUTO: 95 10(3)UL (ref 150–450)
PLATELET # BLD: 127 10*3/UL
PLATELET # BLD: 127 10*3/UL
PLATELET MORPHOLOGY: NORMAL
PO2 BLDA: 104 MM HG (ref 80–100)
PO2 BLDA: 67 MM HG (ref 80–100)
PO2 BLDA: 71 MM HG (ref 80–100)
PO2 BLDV: 31 MM HG (ref 35–40)
PO2 BLDV: 31 MM HG (ref 35–40)
PO2 BLDV: 34 MM HG (ref 35–40)
PO2 BLDV: 36 MM HG (ref 35–40)
PO2 BLDV: 37 MM HG (ref 35–40)
PO2 BLDV: 38 MM HG (ref 35–40)
PO2 BLDV: 39 MM HG (ref 35–40)
PO2 BLDV: 42 MM HG (ref 35–40)
PO2 BLDV: 42 MM HG (ref 35–40)
PO2 BLDV: 43 MM HG (ref 35–40)
PO2 BLDV: 50 MM HG (ref 35–40)
PO2 BLDV: 60 MM HG (ref 35–40)
POTASSIUM SERPL-SCNC: 3.9 MMOL/L (ref 3.3–5.1)
POTASSIUM SERPL-SCNC: 3.9 MMOL/L (ref 3.5–5.1)
POTASSIUM SERPL-SCNC: 4 MMOL/L (ref 3.5–5.1)
POTASSIUM SERPL-SCNC: 4 MMOL/L (ref 3.5–5.1)
POTASSIUM SERPL-SCNC: 4.1 MMOL/L (ref 3.3–5.1)
POTASSIUM SERPL-SCNC: 4.1 MMOL/L (ref 3.3–5.1)
POTASSIUM SERPL-SCNC: 4.1 MMOL/L (ref 3.5–5.1)
POTASSIUM SERPL-SCNC: 4.2 MMOL/L (ref 3.5–5.1)
POTASSIUM SERPL-SCNC: 4.3 MMOL/L (ref 3.3–5.1)
POTASSIUM SERPL-SCNC: 4.4 MMOL/L (ref 3.3–5.1)
POTASSIUM SERPL-SCNC: 4.4 MMOL/L (ref 3.3–5.1)
POTASSIUM SERPL-SCNC: 4.4 MMOL/L (ref 3.5–5.1)
POTASSIUM SERPL-SCNC: 4.5 MMOL/L
POTASSIUM SERPL-SCNC: 4.5 MMOL/L
POTASSIUM SERPL-SCNC: 4.5 MMOL/L (ref 3.3–5.1)
POTASSIUM SERPL-SCNC: 4.5 MMOL/L (ref 3.3–5.1)
POTASSIUM SERPL-SCNC: 4.5 MMOL/L (ref 3.5–5.1)
POTASSIUM SERPL-SCNC: 4.6 MMOL/L (ref 3.3–5.1)
POTASSIUM SERPL-SCNC: 4.6 MMOL/L (ref 3.3–5.1)
POTASSIUM SERPL-SCNC: 4.6 MMOL/L (ref 3.5–5.1)
POTASSIUM SERPL-SCNC: 4.6 MMOL/L (ref 3.5–5.1)
POTASSIUM SERPL-SCNC: 4.7 MMOL/L (ref 3.3–5.1)
POTASSIUM SERPL-SCNC: 4.7 MMOL/L (ref 3.3–5.1)
POTASSIUM SERPL-SCNC: 4.7 MMOL/L (ref 3.5–5.1)
POTASSIUM SERPL-SCNC: 4.8 MMOL/L (ref 3.5–5.1)
POTASSIUM SERPL-SCNC: 4.8 MMOL/L (ref 3.5–5.1)
POTASSIUM SERPL-SCNC: 4.9 MMOL/L (ref 3.5–5.1)
POTASSIUM SERPL-SCNC: 4.9 MMOL/L (ref 3.5–5.1)
POTASSIUM SERPL-SCNC: 5 MMOL/L (ref 3.5–5.1)
POTASSIUM SERPL-SCNC: 5.2 MMOL/L (ref 3.3–5.1)
POTASSIUM SERPL-SCNC: 5.2 MMOL/L (ref 3.5–5.1)
POTASSIUM SERPL-SCNC: 5.5 MMOL/L (ref 3.5–5.1)
POTASSIUM SERPL-SCNC: 5.6 MMOL/L (ref 3.3–5.1)
POTASSIUM SERPL-SCNC: 5.6 MMOL/L (ref 3.5–5.1)
POTASSIUM SERPL-SCNC: 5.7 MMOL/L (ref 3.3–5.1)
POTASSIUM SERPL-SCNC: 6.2 MMOL/L (ref 3.3–5.1)
POTASSIUM SERPL-SCNC: 7 MMOL/L (ref 3.5–5.1)
PROCALCITONIN SERPL-MCNC: 0.06 NG/ML (ref ?–0.11)
PROT SERPL-MCNC: 6.1 G/DL (ref 5.9–8.4)
PROT SERPL-MCNC: 6.2 G/DL (ref 5.9–8.4)
PROT SERPL-MCNC: 7.2 G/DL (ref 5.9–8.4)
PROT SERPL-MCNC: 7.2 G/DL (ref 5.9–8.4)
PROT UR-MCNC: NEGATIVE MG/DL
PROT UR-MCNC: NEGATIVE MG/DL
PROTHROMBIN TIME: 16.8 SECONDS (ref 11.8–14.5)
PROTHROMBIN TIME: 16.9 SECONDS (ref 11.8–14.5)
PUNCTURE CHARGE: NO
PUNCTURE CHARGE: YES
RBC # BLD AUTO: 2.99 X10(6)UL (ref 3.8–5.3)
RBC # BLD AUTO: 3.01 X10(6)UL (ref 3.8–5.3)
RBC # BLD AUTO: 3.03 X10(6)UL (ref 3.8–5.3)
RBC # BLD AUTO: 3.04 X10(6)UL (ref 3.8–5.3)
RBC # BLD AUTO: 3.06 X10(6)UL (ref 3.8–5.3)
RBC # BLD AUTO: 3.07 X10(6)UL (ref 3.8–5.3)
RBC # BLD AUTO: 3.09 X10(6)UL (ref 3.8–5.3)
RBC # BLD AUTO: 3.13 X10(6)UL (ref 3.8–5.3)
RBC # BLD AUTO: 3.14 X10(6)UL (ref 3.8–5.3)
RBC # BLD AUTO: 3.15 X10(6)UL (ref 3.8–5.3)
RBC # BLD AUTO: 3.16 X10(6)UL (ref 3.8–5.3)
RBC # BLD AUTO: 3.17 X10(6)UL (ref 3.8–5.3)
RBC # BLD AUTO: 3.27 X10(6)UL (ref 3.8–5.3)
RBC # BLD AUTO: 3.3 X10(6)UL (ref 3.8–5.3)
RBC # BLD AUTO: 3.33 X10(6)UL (ref 3.8–5.3)
RBC # BLD AUTO: 3.37 X10(6)UL (ref 3.8–5.3)
RBC # BLD AUTO: 3.37 X10(6)UL (ref 3.8–5.3)
RBC # BLD AUTO: 3.4 X10(6)UL (ref 3.8–5.3)
RBC # BLD AUTO: 3.43 X10(6)UL (ref 3.8–5.3)
RBC # BLD AUTO: 3.47 X10(6)UL (ref 3.8–5.3)
RBC # BLD AUTO: 3.52 X10(6)UL (ref 3.8–5.3)
RBC # BLD AUTO: 3.59 X10(6)UL (ref 3.8–5.3)
RBC # BLD AUTO: 3.6 X10(6)UL (ref 3.8–5.3)
RBC # BLD AUTO: 3.61 X10(6)UL (ref 3.8–5.3)
RBC # BLD AUTO: 3.61 X10(6)UL (ref 3.8–5.3)
RBC # BLD AUTO: 3.7 X10(6)UL (ref 3.8–5.3)
RBC # BLD AUTO: 3.75 X10(6)UL (ref 3.8–5.3)
RBC # BLD AUTO: 3.8 X10(6)UL (ref 3.8–5.3)
RBC # BLD AUTO: 3.87 X10(6)UL (ref 3.8–5.3)
RBC # BLD AUTO: 4.17 X10(6)UL (ref 3.8–5.3)
RBC # BLD AUTO: 4.38 X10(6)UL (ref 3.8–5.3)
RBC # BLD AUTO: 4.46 X10(6)UL (ref 3.8–5.3)
RBC # BLD AUTO: 4.47 X10(6)UL (ref 3.8–5.3)
RBC # BLD AUTO: 4.49 X10(6)UL (ref 3.8–5.3)
RBC # BLD AUTO: 4.6 X10(6)UL (ref 3.8–5.3)
RBC # BLD AUTO: 4.8 X10(6)UL (ref 3.8–5.3)
RBC # BLD: 4.49 10*6/UL
RBC # BLD: 4.49 10*6/UL
RBC # FLD: ABNORMAL /CUMM (ref ?–1)
RBC #/AREA URNS AUTO: 1 /HPF
RBC #/AREA URNS AUTO: 30 /HPF
RBC MORPH BLD: NORMAL
RBC MORPH BLD: NORMAL
RESP RATE: 12 BPM
RESP RATE: 12 BPM
SAO2 % BLDA: 95.8 % (ref 94–100)
SAO2 % BLDA: 97.1 % (ref 94–100)
SAO2 % BLDA: >99 % (ref 94–100)
SAO2 % BLDV: 62.1 % (ref 60–85)
SAO2 % BLDV: 66.6 % (ref 60–85)
SAO2 % BLDV: 67.5 % (ref 60–85)
SAO2 % BLDV: 69.7 % (ref 60–85)
SAO2 % BLDV: 69.8 % (ref 60–85)
SAO2 % BLDV: 71.3 % (ref 60–85)
SAO2 % BLDV: 71.5 % (ref 60–85)
SAO2 % BLDV: 72.1 % (ref 60–85)
SAO2 % BLDV: 72.5 % (ref 60–85)
SAO2 % BLDV: 72.5 % (ref 60–85)
SAO2 % BLDV: 74 % (ref 60–85)
SAO2 % BLDV: 75.5 % (ref 60–85)
SAO2 % BLDV: 75.9 % (ref 60–85)
SAO2 % BLDV: 76.9 % (ref 60–85)
SAO2 % BLDV: 77.2 % (ref 60–85)
SAO2 % BLDV: 77.9 % (ref 60–85)
SAO2 % BLDV: 81 % (ref 60–85)
SAO2 % BLDV: 88.3 % (ref 60–85)
SAO2 % BLDV: 95.5 % (ref 60–85)
SODIUM SERPL-SCNC: 127 MMOL/L (ref 136–145)
SODIUM SERPL-SCNC: 128 MMOL/L (ref 136–145)
SODIUM SERPL-SCNC: 129 MMOL/L (ref 136–145)
SODIUM SERPL-SCNC: 129 MMOL/L (ref 136–145)
SODIUM SERPL-SCNC: 130 MMOL/L (ref 136–145)
SODIUM SERPL-SCNC: 131 MMOL/L (ref 136–145)
SODIUM SERPL-SCNC: 131 MMOL/L (ref 136–145)
SODIUM SERPL-SCNC: 132 MMOL/L (ref 136–145)
SODIUM SERPL-SCNC: 133 MMOL/L (ref 136–145)
SODIUM SERPL-SCNC: 134 MMOL/L (ref 136–144)
SODIUM SERPL-SCNC: 134 MMOL/L (ref 136–144)
SODIUM SERPL-SCNC: 134 MMOL/L (ref 136–145)
SODIUM SERPL-SCNC: 135 MMOL/L (ref 136–145)
SODIUM SERPL-SCNC: 136 MMOL/L (ref 136–145)
SODIUM SERPL-SCNC: 137 MMOL/L
SODIUM SERPL-SCNC: 137 MMOL/L
SODIUM SERPL-SCNC: 137 MMOL/L (ref 136–144)
SODIUM SERPL-SCNC: 137 MMOL/L (ref 136–144)
SODIUM SERPL-SCNC: 137 MMOL/L (ref 136–145)
SODIUM SERPL-SCNC: 137 MMOL/L (ref 136–145)
SODIUM SERPL-SCNC: 138 MMOL/L (ref 136–144)
SODIUM SERPL-SCNC: 138 MMOL/L (ref 136–144)
SODIUM SERPL-SCNC: 139 MMOL/L (ref 136–144)
SODIUM SERPL-SCNC: 140 MMOL/L (ref 136–144)
SODIUM SERPL-SCNC: 141 MMOL/L (ref 136–144)
SODIUM SERPL-SCNC: 142 MMOL/L (ref 136–144)
SODIUM SERPL-SCNC: 143 MMOL/L (ref 136–144)
SODIUM SERPL-SCNC: 143 MMOL/L (ref 136–144)
SODIUM SERPL-SCNC: 144 MMOL/L (ref 136–144)
SODIUM SERPL-SCNC: 144 MMOL/L (ref 136–144)
SODIUM SERPL-SCNC: 145 MMOL/L (ref 136–144)
SODIUM SERPL-SCNC: 146 MMOL/L (ref 136–144)
SP GR UR STRIP: 1.01 (ref 1–1.03)
SP GR UR STRIP: 1.02 (ref 1–1.03)
T4 FREE SERPL-MCNC: 0.8 NG/DL (ref 0.8–1.7)
T4 FREE SERPL-MCNC: 1.2 NG/DL (ref 0.8–1.7)
T4 FREE SERPL-MCNC: 1.25 NG/DL (ref 0.58–1.64)
TIBC SERPL-MCNC: 269 MCG/DL (ref 228–428)
TOTAL CELLS COUNTED: 100
TOTAL IRON BINDING CAPACITY: 313 UG/DL (ref 240–450)
TRANSFERRIN SERPL-MCNC: 204 MG/DL (ref 192–382)
TRANSFERRIN SERPL-MCNC: 210 MG/DL (ref 200–360)
TRIGL SERPL-MCNC: 126 MG/DL (ref 30–149)
TROPONIN I SERPL-MCNC: 0.1 NG/ML (ref ?–0.04)
TROPONIN I SERPL-MCNC: 0.1 NG/ML (ref ?–0.04)
TSH SERPL-ACNC: 6.42 UIU/ML (ref 0.45–5.33)
TSI SER-ACNC: 4.95 MIU/ML (ref 0.36–3.74)
TSI SER-ACNC: 7.63 MIU/ML (ref 0.36–3.74)
UROBILINOGEN UR STRIP-ACNC: <2
UROBILINOGEN UR STRIP-ACNC: <2
VIT B12 SERPL-MCNC: 471 PG/ML (ref 181–914)
VIT C UR-MCNC: NEGATIVE MG/DL
VIT C UR-MCNC: NEGATIVE MG/DL
VLDLC SERPL CALC-MCNC: 25 MG/DL (ref 0–30)
WBC # BLD AUTO: 10.2 X10(3) UL (ref 4–11)
WBC # BLD AUTO: 10.2 X10(3) UL (ref 4–11)
WBC # BLD AUTO: 10.3 X10(3) UL (ref 4–11)
WBC # BLD AUTO: 10.4 X10(3) UL (ref 4–11)
WBC # BLD AUTO: 10.5 X10(3) UL (ref 4–11)
WBC # BLD AUTO: 10.6 X10(3) UL (ref 4–11)
WBC # BLD AUTO: 10.9 X10(3) UL (ref 4–11)
WBC # BLD AUTO: 11 X10(3) UL (ref 4–11)
WBC # BLD AUTO: 11.7 X10(3) UL (ref 4–11)
WBC # BLD AUTO: 11.8 X10(3) UL (ref 4–11)
WBC # BLD AUTO: 12 X10(3) UL (ref 4–11)
WBC # BLD AUTO: 12.2 X10(3) UL (ref 4–11)
WBC # BLD AUTO: 13.3 X10(3) UL (ref 4–11)
WBC # BLD AUTO: 13.5 X10(3) UL (ref 4–11)
WBC # BLD AUTO: 6.1 X10(3) UL (ref 4–11)
WBC # BLD AUTO: 6.8 X10(3) UL (ref 4–11)
WBC # BLD AUTO: 6.8 X10(3) UL (ref 4–11)
WBC # BLD AUTO: 7.6 X10(3) UL (ref 4–11)
WBC # BLD AUTO: 7.8 X10(3) UL (ref 4–11)
WBC # BLD AUTO: 8.1 X10(3) UL (ref 4–11)
WBC # BLD AUTO: 8.4 X10(3) UL (ref 4–11)
WBC # BLD AUTO: 8.4 X10(3) UL (ref 4–11)
WBC # BLD AUTO: 8.6 X10(3) UL (ref 4–11)
WBC # BLD AUTO: 8.7 X10(3) UL (ref 4–11)
WBC # BLD AUTO: 9.2 X10(3) UL (ref 4–11)
WBC # BLD AUTO: 9.3 X10(3) UL (ref 4–11)
WBC # BLD AUTO: 9.5 X10(3) UL (ref 4–11)
WBC # BLD AUTO: 9.6 X10(3) UL (ref 4–11)
WBC # BLD AUTO: 9.8 X10(3) UL (ref 4–11)
WBC # BLD AUTO: 9.9 X10(3) UL (ref 4–11)
WBC # BLD AUTO: 9.9 X10(3) UL (ref 4–11)
WBC # BLD: 9.2 10*3/UL
WBC # BLD: 9.2 10*3/UL
WBC # FLD: 421 /CUMM (ref ?–1)
WBC #/AREA URNS AUTO: 2 /HPF
WBC #/AREA URNS AUTO: 32 /HPF
WBC MORPH BLD: NORMAL
WBC MORPH BLD: NORMAL
WBC OTHER NFR FLD: 1 %

## 2019-01-01 PROCEDURE — 99213 OFFICE O/P EST LOW 20 MIN: CPT | Performed by: INTERNAL MEDICINE

## 2019-01-01 PROCEDURE — 99233 SBSQ HOSP IP/OBS HIGH 50: CPT | Performed by: INTERNAL MEDICINE

## 2019-01-01 PROCEDURE — 99212 OFFICE O/P EST SF 10 MIN: CPT | Performed by: CLINICAL NURSE SPECIALIST

## 2019-01-01 PROCEDURE — 99232 SBSQ HOSP IP/OBS MODERATE 35: CPT | Performed by: INTERNAL MEDICINE

## 2019-01-01 PROCEDURE — 90935 HEMODIALYSIS ONE EVALUATION: CPT | Performed by: INTERNAL MEDICINE

## 2019-01-01 PROCEDURE — B5171ZZ FLUOROSCOPY OF LEFT SUBCLAVIAN VEIN USING LOW OSMOLAR CONTRAST: ICD-10-PCS | Performed by: SURGERY

## 2019-01-01 PROCEDURE — 99222 1ST HOSP IP/OBS MODERATE 55: CPT | Performed by: INTERNAL MEDICINE

## 2019-01-01 PROCEDURE — 99223 1ST HOSP IP/OBS HIGH 75: CPT | Performed by: HOSPITALIST

## 2019-01-01 PROCEDURE — 99152 MOD SED SAME PHYS/QHP 5/>YRS: CPT

## 2019-01-01 PROCEDURE — 71045 X-RAY EXAM CHEST 1 VIEW: CPT | Performed by: CLINICAL NURSE SPECIALIST

## 2019-01-01 PROCEDURE — 76705 ECHO EXAM OF ABDOMEN: CPT | Performed by: PHYSICIAN ASSISTANT

## 2019-01-01 PROCEDURE — 99223 1ST HOSP IP/OBS HIGH 75: CPT | Performed by: INTERNAL MEDICINE

## 2019-01-01 PROCEDURE — 99233 SBSQ HOSP IP/OBS HIGH 50: CPT | Performed by: HOSPITALIST

## 2019-01-01 PROCEDURE — 02583ZZ DESTRUCTION OF CONDUCTION MECHANISM, PERCUTANEOUS APPROACH: ICD-10-PCS | Performed by: INTERNAL MEDICINE

## 2019-01-01 PROCEDURE — 99497 ADVNCD CARE PLAN 30 MIN: CPT | Performed by: HOSPITALIST

## 2019-01-01 PROCEDURE — 82962 GLUCOSE BLOOD TEST: CPT

## 2019-01-01 PROCEDURE — 4B02XTZ MEASUREMENT OF CARDIAC DEFIBRILLATOR, EXTERNAL APPROACH: ICD-10-PCS | Performed by: INTERNAL MEDICINE

## 2019-01-01 PROCEDURE — 93306 TTE W/DOPPLER COMPLETE: CPT | Performed by: INTERNAL MEDICINE

## 2019-01-01 PROCEDURE — 05HY33Z INSERTION OF INFUSION DEVICE INTO UPPER VEIN, PERCUTANEOUS APPROACH: ICD-10-PCS | Performed by: HOSPITALIST

## 2019-01-01 PROCEDURE — 36005 INJECTION EXT VENOGRAPHY: CPT

## 2019-01-01 PROCEDURE — 76705 ECHO EXAM OF ABDOMEN: CPT | Performed by: INTERNAL MEDICINE

## 2019-01-01 PROCEDURE — 36415 COLL VENOUS BLD VENIPUNCTURE: CPT

## 2019-01-01 PROCEDURE — 02HV33Z INSERTION OF INFUSION DEVICE INTO SUPERIOR VENA CAVA, PERCUTANEOUS APPROACH: ICD-10-PCS | Performed by: RADIOLOGY

## 2019-01-01 PROCEDURE — 99214 OFFICE O/P EST MOD 30 MIN: CPT | Performed by: CLINICAL NURSE SPECIALIST

## 2019-01-01 PROCEDURE — 87641 MR-STAPH DNA AMP PROBE: CPT

## 2019-01-01 PROCEDURE — 71045 X-RAY EXAM CHEST 1 VIEW: CPT | Performed by: INTERNAL MEDICINE

## 2019-01-01 PROCEDURE — G0463 HOSPITAL OUTPT CLINIC VISIT: HCPCS | Performed by: INTERNAL MEDICINE

## 2019-01-01 PROCEDURE — 49083 ABD PARACENTESIS W/IMAGING: CPT | Performed by: INTERNAL MEDICINE

## 2019-01-01 PROCEDURE — 99214 OFFICE O/P EST MOD 30 MIN: CPT | Performed by: INTERNAL MEDICINE

## 2019-01-01 PROCEDURE — 0JH63XZ INSERTION OF TUNNELED VASCULAR ACCESS DEVICE INTO CHEST SUBCUTANEOUS TISSUE AND FASCIA, PERCUTANEOUS APPROACH: ICD-10-PCS | Performed by: RADIOLOGY

## 2019-01-01 PROCEDURE — 93295 DEV INTERROG REMOTE 1/2/MLT: CPT | Performed by: INTERNAL MEDICINE

## 2019-01-01 PROCEDURE — 99239 HOSP IP/OBS DSCHRG MGMT >30: CPT | Performed by: HOSPITALIST

## 2019-01-01 PROCEDURE — G0463 HOSPITAL OUTPT CLINIC VISIT: HCPCS | Performed by: PHYSICIAN ASSISTANT

## 2019-01-01 PROCEDURE — 5A1D70Z PERFORMANCE OF URINARY FILTRATION, INTERMITTENT, LESS THAN 6 HOURS PER DAY: ICD-10-PCS | Performed by: INTERNAL MEDICINE

## 2019-01-01 PROCEDURE — 99214 OFFICE O/P EST MOD 30 MIN: CPT | Performed by: PHYSICIAN ASSISTANT

## 2019-01-01 PROCEDURE — 4A023N6 MEASUREMENT OF CARDIAC SAMPLING AND PRESSURE, RIGHT HEART, PERCUTANEOUS APPROACH: ICD-10-PCS | Performed by: INTERNAL MEDICINE

## 2019-01-01 PROCEDURE — 80048 BASIC METABOLIC PNL TOTAL CA: CPT | Performed by: SURGERY

## 2019-01-01 PROCEDURE — 76942 ECHO GUIDE FOR BIOPSY: CPT | Performed by: SURGERY

## 2019-01-01 PROCEDURE — G0463 HOSPITAL OUTPT CLINIC VISIT: HCPCS | Performed by: OTOLARYNGOLOGY

## 2019-01-01 PROCEDURE — 0W9G3ZZ DRAINAGE OF PERITONEAL CAVITY, PERCUTANEOUS APPROACH: ICD-10-PCS | Performed by: CLINICAL NURSE SPECIALIST

## 2019-01-01 PROCEDURE — 93010 ELECTROCARDIOGRAM REPORT: CPT | Performed by: INTERNAL MEDICINE

## 2019-01-01 PROCEDURE — B51N1ZZ FLUOROSCOPY OF LEFT UPPER EXTREMITY VEINS USING LOW OSMOLAR CONTRAST: ICD-10-PCS | Performed by: SURGERY

## 2019-01-01 PROCEDURE — G0463 HOSPITAL OUTPT CLINIC VISIT: HCPCS | Performed by: NURSE PRACTITIONER

## 2019-01-01 PROCEDURE — 0JPT0PZ REMOVAL OF CARDIAC RHYTHM RELATED DEVICE FROM TRUNK SUBCUTANEOUS TISSUE AND FASCIA, OPEN APPROACH: ICD-10-PCS | Performed by: INTERNAL MEDICINE

## 2019-01-01 PROCEDURE — 93005 ELECTROCARDIOGRAM TRACING: CPT

## 2019-01-01 PROCEDURE — 76770 US EXAM ABDO BACK WALL COMP: CPT | Performed by: INTERNAL MEDICINE

## 2019-01-01 PROCEDURE — 80048 BASIC METABOLIC PNL TOTAL CA: CPT

## 2019-01-01 PROCEDURE — 85025 COMPLETE CBC W/AUTO DIFF WBC: CPT | Performed by: SURGERY

## 2019-01-01 PROCEDURE — 93970 EXTREMITY STUDY: CPT | Performed by: CLINICAL NURSE SPECIALIST

## 2019-01-01 PROCEDURE — 75820 VEIN X-RAY ARM/LEG: CPT

## 2019-01-01 PROCEDURE — 71045 X-RAY EXAM CHEST 1 VIEW: CPT | Performed by: EMERGENCY MEDICINE

## 2019-01-01 PROCEDURE — 93284 PRGRMG EVAL IMPLANTABLE DFB: CPT | Performed by: INTERNAL MEDICINE

## 2019-01-01 PROCEDURE — 33264 RMVL & RPLCMT DFB GEN MLT LD: CPT | Performed by: INTERNAL MEDICINE

## 2019-01-01 PROCEDURE — 93641 EP EVL 1/2CHMB PAC CVDFB TST: CPT

## 2019-01-01 PROCEDURE — 36415 COLL VENOUS BLD VENIPUNCTURE: CPT | Performed by: SURGERY

## 2019-01-01 PROCEDURE — 33264 RMVL & RPLCMT DFB GEN MLT LD: CPT

## 2019-01-01 PROCEDURE — 80162 ASSAY OF DIGOXIN TOTAL: CPT | Performed by: INTERNAL MEDICINE

## 2019-01-01 PROCEDURE — G0463 HOSPITAL OUTPT CLINIC VISIT: HCPCS | Performed by: ORTHOPAEDIC SURGERY

## 2019-01-01 PROCEDURE — 99152 MOD SED SAME PHYS/QHP 5/>YRS: CPT | Performed by: SURGERY

## 2019-01-01 PROCEDURE — 99213 OFFICE O/P EST LOW 20 MIN: CPT | Performed by: OTOLARYNGOLOGY

## 2019-01-01 PROCEDURE — 3E0T3BZ INTRODUCTION OF ANESTHETIC AGENT INTO PERIPHERAL NERVES AND PLEXI, PERCUTANEOUS APPROACH: ICD-10-PCS | Performed by: ANESTHESIOLOGY

## 2019-01-01 PROCEDURE — 71046 X-RAY EXAM CHEST 2 VIEWS: CPT | Performed by: INTERNAL MEDICINE

## 2019-01-01 PROCEDURE — 99203 OFFICE O/P NEW LOW 30 MIN: CPT | Performed by: ORTHOPAEDIC SURGERY

## 2019-01-01 PROCEDURE — 0JH609Z INSERTION OF CARDIAC RESYNCHRONIZATION DEFIBRILLATOR PULSE GENERATOR INTO CHEST SUBCUTANEOUS TISSUE AND FASCIA, OPEN APPROACH: ICD-10-PCS | Performed by: INTERNAL MEDICINE

## 2019-01-01 PROCEDURE — 64417 NJX AA&/STRD AX NERVE IMG: CPT | Performed by: SURGERY

## 2019-01-01 PROCEDURE — 99213 OFFICE O/P EST LOW 20 MIN: CPT | Performed by: NURSE PRACTITIONER

## 2019-01-01 PROCEDURE — 99153 MOD SED SAME PHYS/QHP EA: CPT

## 2019-01-01 PROCEDURE — 99213 OFFICE O/P EST LOW 20 MIN: CPT | Performed by: SURGERY

## 2019-01-01 PROCEDURE — 85025 COMPLETE CBC W/AUTO DIFF WBC: CPT

## 2019-01-01 PROCEDURE — 99152 MOD SED SAME PHYS/QHP 5/>YRS: CPT | Performed by: INTERNAL MEDICINE

## 2019-01-01 PROCEDURE — 031809D BYPASS LEFT BRACHIAL ARTERY TO UPPER ARM VEIN WITH AUTOLOGOUS VENOUS TISSUE, OPEN APPROACH: ICD-10-PCS | Performed by: SURGERY

## 2019-01-01 PROCEDURE — G0463 HOSPITAL OUTPT CLINIC VISIT: HCPCS | Performed by: SURGERY

## 2019-01-01 RX ORDER — ONDANSETRON 2 MG/ML
4 INJECTION INTRAMUSCULAR; INTRAVENOUS EVERY 6 HOURS PRN
Status: DISCONTINUED | OUTPATIENT
Start: 2019-01-01 | End: 2019-01-01

## 2019-01-01 RX ORDER — MIDODRINE HYDROCHLORIDE 5 MG/1
5 TABLET ORAL ONCE
Status: COMPLETED | OUTPATIENT
Start: 2019-01-01 | End: 2019-01-01

## 2019-01-01 RX ORDER — ALBUMIN (HUMAN) 12.5 G/50ML
100 SOLUTION INTRAVENOUS AS NEEDED
Status: DISCONTINUED | OUTPATIENT
Start: 2019-01-01 | End: 2019-01-01

## 2019-01-01 RX ORDER — FUROSEMIDE 10 MG/ML
40 INJECTION INTRAMUSCULAR; INTRAVENOUS ONCE
Status: COMPLETED | OUTPATIENT
Start: 2019-01-01 | End: 2019-01-01

## 2019-01-01 RX ORDER — ATORVASTATIN CALCIUM 20 MG/1
20 TABLET, FILM COATED ORAL NIGHTLY
Status: DISCONTINUED | OUTPATIENT
Start: 2019-01-01 | End: 2019-01-01

## 2019-01-01 RX ORDER — MORPHINE SULFATE 4 MG/ML
4 INJECTION, SOLUTION INTRAMUSCULAR; INTRAVENOUS EVERY 10 MIN PRN
Status: DISCONTINUED | OUTPATIENT
Start: 2019-01-01 | End: 2019-01-01

## 2019-01-01 RX ORDER — HYDRALAZINE HYDROCHLORIDE 10 MG/1
10 TABLET, FILM COATED ORAL EVERY 8 HOURS SCHEDULED
Status: DISCONTINUED | OUTPATIENT
Start: 2019-01-01 | End: 2019-01-01

## 2019-01-01 RX ORDER — SODIUM CHLORIDE 0.9 % (FLUSH) 0.9 %
3 SYRINGE (ML) INJECTION AS NEEDED
Status: DISCONTINUED | OUTPATIENT
Start: 2019-01-01 | End: 2019-01-01

## 2019-01-01 RX ORDER — MONTELUKAST SODIUM 10 MG/1
10 TABLET ORAL NIGHTLY
Status: DISCONTINUED | OUTPATIENT
Start: 2019-01-01 | End: 2019-01-01

## 2019-01-01 RX ORDER — MILRINONE LACTATE 0.2 MG/ML
0.12 INJECTION, SOLUTION INTRAVENOUS CONTINUOUS
Status: DISCONTINUED | OUTPATIENT
Start: 2019-01-01 | End: 2019-01-01

## 2019-01-01 RX ORDER — ASPIRIN 81 MG/1
81 TABLET, CHEWABLE ORAL DAILY
Status: DISCONTINUED | OUTPATIENT
Start: 2019-01-01 | End: 2019-01-01

## 2019-01-01 RX ORDER — PHENYLEPHRINE HCL 10 MG/ML
VIAL (ML) INJECTION AS NEEDED
Status: DISCONTINUED | OUTPATIENT
Start: 2019-01-01 | End: 2019-01-01 | Stop reason: SURG

## 2019-01-01 RX ORDER — MONTELUKAST SODIUM 10 MG/1
TABLET ORAL
COMMUNITY
Start: 2009-12-07

## 2019-01-01 RX ORDER — CEFAZOLIN SODIUM/WATER 2 G/20 ML
SYRINGE (ML) INTRAVENOUS
Status: COMPLETED
Start: 2019-01-01 | End: 2019-01-01

## 2019-01-01 RX ORDER — BUMETANIDE 0.25 MG/ML
2 INJECTION, SOLUTION INTRAMUSCULAR; INTRAVENOUS EVERY 12 HOURS
Status: DISCONTINUED | OUTPATIENT
Start: 2019-01-01 | End: 2019-01-01

## 2019-01-01 RX ORDER — HYDRALAZINE HYDROCHLORIDE 10 MG/1
5 TABLET, FILM COATED ORAL EVERY 8 HOURS SCHEDULED
Status: DISCONTINUED | OUTPATIENT
Start: 2019-01-01 | End: 2019-01-01

## 2019-01-01 RX ORDER — SODIUM CHLORIDE 9 MG/ML
INJECTION, SOLUTION INTRAVENOUS
Status: DISCONTINUED | OUTPATIENT
Start: 2019-01-01 | End: 2019-01-01

## 2019-01-01 RX ORDER — LORAZEPAM 2 MG/ML
2 INJECTION INTRAMUSCULAR EVERY 4 HOURS PRN
Status: DISCONTINUED | OUTPATIENT
Start: 2019-01-01 | End: 2019-01-01

## 2019-01-01 RX ORDER — POLYETHYLENE GLYCOL 3350 17 G/17G
17 POWDER, FOR SOLUTION ORAL AS NEEDED
Status: DISCONTINUED | OUTPATIENT
Start: 2019-01-01 | End: 2019-01-01

## 2019-01-01 RX ORDER — NALOXONE HYDROCHLORIDE 0.4 MG/ML
80 INJECTION, SOLUTION INTRAMUSCULAR; INTRAVENOUS; SUBCUTANEOUS AS NEEDED
Status: DISCONTINUED | OUTPATIENT
Start: 2019-01-01 | End: 2019-01-01

## 2019-01-01 RX ORDER — ACETAMINOPHEN 325 MG/1
650 TABLET ORAL EVERY 6 HOURS PRN
Status: DISCONTINUED | OUTPATIENT
Start: 2019-01-01 | End: 2019-01-01

## 2019-01-01 RX ORDER — IBUPROFEN 200 MG
1 CAPSULE ORAL 3 TIMES DAILY
Qty: 14 G | Refills: 0 | Status: ON HOLD | OUTPATIENT
Start: 2019-01-01 | End: 2019-01-01

## 2019-01-01 RX ORDER — MAGNESIUM OXIDE 400 MG (241.3 MG MAGNESIUM) TABLET
1 TABLET NIGHTLY
Status: DISCONTINUED | OUTPATIENT
Start: 2019-01-01 | End: 2019-01-01

## 2019-01-01 RX ORDER — BISACODYL 10 MG
10 SUPPOSITORY, RECTAL RECTAL
Status: DISCONTINUED | OUTPATIENT
Start: 2019-01-01 | End: 2019-01-01

## 2019-01-01 RX ORDER — MILRINONE LACTATE 0.2 MG/ML
0.25 INJECTION, SOLUTION INTRAVENOUS CONTINUOUS
Status: DISCONTINUED | OUTPATIENT
Start: 2019-01-01 | End: 2019-01-01

## 2019-01-01 RX ORDER — LISINOPRIL 5 MG/1
1 TABLET ORAL DAILY
COMMUNITY
Start: 2018-08-13

## 2019-01-01 RX ORDER — SODIUM CHLORIDE 9 MG/ML
INJECTION, SOLUTION INTRAVENOUS
Status: DISCONTINUED
Start: 2019-01-01 | End: 2019-01-01

## 2019-01-01 RX ORDER — MORPHINE SULFATE IN 0.9 % NACL 1 MG/ML
1 PLASTIC BAG, INJECTION (ML) INTRAVENOUS CONTINUOUS PRN
Status: DISCONTINUED | OUTPATIENT
Start: 2019-01-01 | End: 2019-01-01

## 2019-01-01 RX ORDER — SODIUM CHLORIDE, SODIUM LACTATE, POTASSIUM CHLORIDE, CALCIUM CHLORIDE 600; 310; 30; 20 MG/100ML; MG/100ML; MG/100ML; MG/100ML
INJECTION, SOLUTION INTRAVENOUS CONTINUOUS
Status: DISCONTINUED | OUTPATIENT
Start: 2019-01-01 | End: 2019-01-01

## 2019-01-01 RX ORDER — SODIUM CHLORIDE 9 MG/ML
INJECTION, SOLUTION INTRAVENOUS
Status: DISPENSED
Start: 2019-01-01 | End: 2019-01-01

## 2019-01-01 RX ORDER — MIDAZOLAM HYDROCHLORIDE 1 MG/ML
INJECTION INTRAMUSCULAR; INTRAVENOUS
Status: DISCONTINUED
Start: 2019-01-01 | End: 2019-01-01 | Stop reason: WASHOUT

## 2019-01-01 RX ORDER — DEXTROSE MONOHYDRATE 25 G/50ML
50 INJECTION, SOLUTION INTRAVENOUS AS NEEDED
Status: DISCONTINUED | OUTPATIENT
Start: 2019-01-01 | End: 2019-01-01

## 2019-01-01 RX ORDER — HEPARIN SODIUM 5000 [USP'U]/ML
5000 INJECTION, SOLUTION INTRAVENOUS; SUBCUTANEOUS EVERY 12 HOURS SCHEDULED
Status: DISCONTINUED | OUTPATIENT
Start: 2019-01-01 | End: 2019-01-01

## 2019-01-01 RX ORDER — SODIUM CHLORIDE 0.9 % (FLUSH) 0.9 %
10 SYRINGE (ML) INJECTION AS NEEDED
Status: DISCONTINUED | OUTPATIENT
Start: 2019-01-01 | End: 2019-01-01

## 2019-01-01 RX ORDER — FUROSEMIDE 40 MG/1
40 TABLET ORAL EVERY 8 HOURS PRN
Status: DISCONTINUED | OUTPATIENT
Start: 2019-01-01 | End: 2019-01-01

## 2019-01-01 RX ORDER — LIDOCAINE HYDROCHLORIDE 20 MG/ML
INJECTION, SOLUTION EPIDURAL; INFILTRATION; INTRACAUDAL; PERINEURAL
Status: COMPLETED
Start: 2019-01-01 | End: 2019-01-01

## 2019-01-01 RX ORDER — TRAMADOL HYDROCHLORIDE 50 MG/1
50 TABLET ORAL EVERY 6 HOURS PRN
Status: ON HOLD | COMMUNITY
End: 2019-01-01

## 2019-01-01 RX ORDER — SODIUM POLYSTYRENE SULFONATE 15 G/60ML
30 SUSPENSION ORAL; RECTAL ONCE
Status: COMPLETED | OUTPATIENT
Start: 2019-01-01 | End: 2019-01-01

## 2019-01-01 RX ORDER — SODIUM CHLORIDE 9 MG/ML
INJECTION, SOLUTION INTRAVENOUS
Status: DISCONTINUED
Start: 2019-01-01 | End: 2019-01-01 | Stop reason: WASHOUT

## 2019-01-01 RX ORDER — ACETAMINOPHEN 325 MG/1
TABLET ORAL EVERY 6 HOURS PRN
COMMUNITY
End: 2019-01-01

## 2019-01-01 RX ORDER — TRAMADOL HYDROCHLORIDE 50 MG/1
50 TABLET ORAL EVERY 6 HOURS PRN
Qty: 50 TABLET | Refills: 1 | Status: SHIPPED | OUTPATIENT
Start: 2019-01-01 | End: 2019-01-01

## 2019-01-01 RX ORDER — CEFAZOLIN SODIUM/WATER 2 G/20 ML
2 SYRINGE (ML) INTRAVENOUS
Status: DISCONTINUED | OUTPATIENT
Start: 2019-01-01 | End: 2019-01-01

## 2019-01-01 RX ORDER — LISINOPRIL 2.5 MG/1
2.5 TABLET ORAL DAILY
Status: DISCONTINUED | OUTPATIENT
Start: 2019-01-01 | End: 2019-01-01

## 2019-01-01 RX ORDER — ROPIVACAINE HYDROCHLORIDE 5 MG/ML
INJECTION, SOLUTION EPIDURAL; INFILTRATION; PERINEURAL
Status: COMPLETED | OUTPATIENT
Start: 2019-01-01 | End: 2019-01-01

## 2019-01-01 RX ORDER — DEXTROSE AND SODIUM CHLORIDE 5; .9 G/100ML; G/100ML
INJECTION, SOLUTION INTRAVENOUS CONTINUOUS
Status: DISCONTINUED | OUTPATIENT
Start: 2019-01-01 | End: 2019-01-01

## 2019-01-01 RX ORDER — HEPARIN SODIUM 1000 [USP'U]/ML
INJECTION, SOLUTION INTRAVENOUS; SUBCUTANEOUS
Status: DISPENSED
Start: 2019-01-01 | End: 2019-01-01

## 2019-01-01 RX ORDER — SODIUM CHLORIDE 0.9 % (FLUSH) 0.9 %
10 SYRINGE (ML) INJECTION AS NEEDED
Status: DISCONTINUED | OUTPATIENT
Start: 2019-01-01 | End: 2019-01-01 | Stop reason: ALTCHOICE

## 2019-01-01 RX ORDER — METOPROLOL SUCCINATE 50 MG/1
50 TABLET, EXTENDED RELEASE ORAL
Status: DISCONTINUED | OUTPATIENT
Start: 2019-01-01 | End: 2019-01-01

## 2019-01-01 RX ORDER — CLINDAMYCIN PHOSPHATE 600 MG/50ML
600 INJECTION INTRAVENOUS EVERY 8 HOURS
Status: DISCONTINUED | OUTPATIENT
Start: 2019-01-01 | End: 2019-01-01

## 2019-01-01 RX ORDER — ALLOPURINOL 100 MG/1
TABLET ORAL
Qty: 90 TABLET | Refills: 1 | OUTPATIENT
Start: 2019-01-01

## 2019-01-01 RX ORDER — METOPROLOL TARTRATE 5 MG/5ML
2.5 INJECTION INTRAVENOUS ONCE
Status: DISCONTINUED | OUTPATIENT
Start: 2019-01-01 | End: 2019-01-01

## 2019-01-01 RX ORDER — MIDAZOLAM HYDROCHLORIDE 1 MG/ML
INJECTION INTRAMUSCULAR; INTRAVENOUS
Status: DISPENSED
Start: 2019-01-01 | End: 2019-01-01

## 2019-01-01 RX ORDER — SODIUM CHLORIDE 9 MG/ML
INJECTION, SOLUTION INTRAVENOUS
Status: COMPLETED
Start: 2019-01-01 | End: 2019-01-01

## 2019-01-01 RX ORDER — HYDROCODONE BITARTRATE AND ACETAMINOPHEN 5; 325 MG/1; MG/1
1 TABLET ORAL AS NEEDED
Status: DISCONTINUED | OUTPATIENT
Start: 2019-01-01 | End: 2019-01-01

## 2019-01-01 RX ORDER — PROCHLORPERAZINE EDISYLATE 5 MG/ML
5 INJECTION INTRAMUSCULAR; INTRAVENOUS ONCE AS NEEDED
Status: DISCONTINUED | OUTPATIENT
Start: 2019-01-01 | End: 2019-01-01

## 2019-01-01 RX ORDER — MIDODRINE HYDROCHLORIDE 5 MG/1
TABLET ORAL
Qty: 90 TABLET | Refills: 1 | Status: SHIPPED | OUTPATIENT
Start: 2019-01-01 | End: 2019-01-01

## 2019-01-01 RX ORDER — BACITRACIN 50000 [USP'U]/1
INJECTION, POWDER, LYOPHILIZED, FOR SOLUTION INTRAMUSCULAR
Status: COMPLETED
Start: 2019-01-01 | End: 2019-01-01

## 2019-01-01 RX ORDER — METOPROLOL SUCCINATE 50 MG/1
50 TABLET, EXTENDED RELEASE ORAL
Qty: 60 TABLET | Refills: 5 | Status: SHIPPED | OUTPATIENT
Start: 2019-01-01 | End: 2019-01-01

## 2019-01-01 RX ORDER — MORPHINE SULFATE 2 MG/ML
2 INJECTION, SOLUTION INTRAMUSCULAR; INTRAVENOUS ONCE
Status: COMPLETED | OUTPATIENT
Start: 2019-01-01 | End: 2019-01-01

## 2019-01-01 RX ORDER — SODIUM CHLORIDE 9 MG/ML
INJECTION, SOLUTION INTRAVENOUS CONTINUOUS
Status: DISCONTINUED | OUTPATIENT
Start: 2019-01-01 | End: 2019-01-01

## 2019-01-01 RX ORDER — MIDAZOLAM HYDROCHLORIDE 1 MG/ML
INJECTION INTRAMUSCULAR; INTRAVENOUS
Status: COMPLETED | OUTPATIENT
Start: 2019-01-01 | End: 2019-01-01

## 2019-01-01 RX ORDER — CARVEDILOL 25 MG/1
TABLET ORAL 2 TIMES DAILY
COMMUNITY
Start: 2017-02-27

## 2019-01-01 RX ORDER — ZOLPIDEM TARTRATE 5 MG/1
5 TABLET ORAL NIGHTLY PRN
Status: DISCONTINUED | OUTPATIENT
Start: 2019-01-01 | End: 2019-01-01

## 2019-01-01 RX ORDER — LIDOCAINE HYDROCHLORIDE AND EPINEPHRINE 10; 10 MG/ML; UG/ML
INJECTION, SOLUTION INFILTRATION; PERINEURAL
Status: COMPLETED
Start: 2019-01-01 | End: 2019-01-01

## 2019-01-01 RX ORDER — CLINDAMYCIN HYDROCHLORIDE 300 MG/1
600 CAPSULE ORAL 3 TIMES DAILY
Qty: 60 CAPSULE | Refills: 0 | Status: SHIPPED | OUTPATIENT
Start: 2019-01-01 | End: 2019-01-01

## 2019-01-01 RX ORDER — AMOXICILLIN 500 MG/1
500 CAPSULE ORAL 3 TIMES DAILY
Qty: 21 CAPSULE | Refills: 0 | Status: SHIPPED | OUTPATIENT
Start: 2019-01-01 | End: 2019-01-01

## 2019-01-01 RX ORDER — PEN NEEDLE, DIABETIC 30 GX3/16"
1 NEEDLE, DISPOSABLE MISCELLANEOUS 2 TIMES DAILY
Qty: 200 EACH | Refills: 0 | Status: SHIPPED | OUTPATIENT
Start: 2019-01-01

## 2019-01-01 RX ORDER — DEXTROSE MONOHYDRATE 25 G/50ML
50 INJECTION, SOLUTION INTRAVENOUS ONCE
Status: COMPLETED | OUTPATIENT
Start: 2019-01-01 | End: 2019-01-01

## 2019-01-01 RX ORDER — MIDAZOLAM HYDROCHLORIDE 1 MG/ML
INJECTION INTRAMUSCULAR; INTRAVENOUS
Status: COMPLETED
Start: 2019-01-01 | End: 2019-01-01

## 2019-01-01 RX ORDER — ALBUMIN (HUMAN) 12.5 G/50ML
25 SOLUTION INTRAVENOUS EVERY 12 HOURS
Status: COMPLETED | OUTPATIENT
Start: 2019-01-01 | End: 2019-01-01

## 2019-01-01 RX ORDER — CHLORHEXIDINE GLUCONATE 4 G/100ML
30 SOLUTION TOPICAL
Status: DISCONTINUED | OUTPATIENT
Start: 2019-01-01 | End: 2019-01-01

## 2019-01-01 RX ORDER — ISOPROTERENOL HYDROCHLORIDE 0.2 MG/ML
INJECTION, SOLUTION INTRAMUSCULAR; INTRAVENOUS
Status: DISCONTINUED
Start: 2019-01-01 | End: 2019-01-01 | Stop reason: WASHOUT

## 2019-01-01 RX ORDER — DIGOXIN 125 MCG
125 TABLET ORAL
Status: DISCONTINUED | OUTPATIENT
Start: 2019-01-01 | End: 2019-01-01

## 2019-01-01 RX ORDER — TRAMADOL HYDROCHLORIDE 50 MG/1
50 TABLET ORAL EVERY 12 HOURS PRN
Status: DISCONTINUED | OUTPATIENT
Start: 2019-01-01 | End: 2019-01-01

## 2019-01-01 RX ORDER — LORAZEPAM 2 MG/ML
1 INJECTION INTRAMUSCULAR EVERY 4 HOURS PRN
Status: DISCONTINUED | OUTPATIENT
Start: 2019-01-01 | End: 2019-01-01

## 2019-01-01 RX ORDER — FUROSEMIDE 10 MG/ML
40 INJECTION INTRAMUSCULAR; INTRAVENOUS 2 TIMES DAILY
Status: DISCONTINUED | OUTPATIENT
Start: 2019-01-01 | End: 2019-01-01

## 2019-01-01 RX ORDER — HALOPERIDOL 5 MG/ML
1 INJECTION INTRAMUSCULAR
Status: DISCONTINUED | OUTPATIENT
Start: 2019-01-01 | End: 2019-01-01

## 2019-01-01 RX ORDER — DIPHENHYDRAMINE HCL 25 MG
25 TABLET ORAL EVERY 6 HOURS PRN
COMMUNITY
End: 2019-01-01

## 2019-01-01 RX ORDER — DIGOXIN 125 MCG
TABLET ORAL
COMMUNITY
Start: 2016-02-02

## 2019-01-01 RX ORDER — HYDRALAZINE HYDROCHLORIDE 10 MG/1
20 TABLET, FILM COATED ORAL EVERY 8 HOURS SCHEDULED
Status: DISCONTINUED | OUTPATIENT
Start: 2019-01-01 | End: 2019-01-01

## 2019-01-01 RX ORDER — TRAMADOL HYDROCHLORIDE 50 MG/1
TABLET ORAL
Qty: 50 TABLET | Refills: 0 | OUTPATIENT
Start: 2019-01-01 | End: 2019-01-01

## 2019-01-01 RX ORDER — HEPARIN SODIUM 1000 [USP'U]/ML
1.5 INJECTION, SOLUTION INTRAVENOUS; SUBCUTANEOUS ONCE
Status: COMPLETED | OUTPATIENT
Start: 2019-01-01 | End: 2019-01-01

## 2019-01-01 RX ORDER — HYDROMORPHONE HYDROCHLORIDE 1 MG/ML
0.2 INJECTION, SOLUTION INTRAMUSCULAR; INTRAVENOUS; SUBCUTANEOUS EVERY 5 MIN PRN
Status: DISCONTINUED | OUTPATIENT
Start: 2019-01-01 | End: 2019-01-01

## 2019-01-01 RX ORDER — 0.9 % SODIUM CHLORIDE 0.9 %
VIAL (ML) INJECTION
Status: COMPLETED
Start: 2019-01-01 | End: 2019-01-01

## 2019-01-01 RX ORDER — IPRATROPIUM BROMIDE AND ALBUTEROL SULFATE 2.5; .5 MG/3ML; MG/3ML
SOLUTION RESPIRATORY (INHALATION)
Status: COMPLETED
Start: 2019-01-01 | End: 2019-01-01

## 2019-01-01 RX ORDER — MORPHINE SULFATE 2 MG/ML
1 INJECTION, SOLUTION INTRAMUSCULAR; INTRAVENOUS
Status: DISCONTINUED | OUTPATIENT
Start: 2019-01-01 | End: 2019-01-01

## 2019-01-01 RX ORDER — LACTULOSE 10 G/15ML
20 SOLUTION ORAL 3 TIMES DAILY
Status: DISCONTINUED | OUTPATIENT
Start: 2019-01-01 | End: 2019-01-01

## 2019-01-01 RX ORDER — ALLOPURINOL 100 MG/1
100 TABLET ORAL DAILY
Status: DISCONTINUED | OUTPATIENT
Start: 2019-01-01 | End: 2019-01-01

## 2019-01-01 RX ORDER — ACETAMINOPHEN 325 MG/1
650 TABLET ORAL EVERY 4 HOURS PRN
Status: CANCELLED | OUTPATIENT
Start: 2019-01-01

## 2019-01-01 RX ORDER — MONTELUKAST SODIUM 10 MG/1
TABLET ORAL
Qty: 90 TABLET | Refills: 1 | Status: ON HOLD | OUTPATIENT
Start: 2019-01-01 | End: 2019-01-01

## 2019-01-01 RX ORDER — ONDANSETRON 4 MG/1
4 TABLET, ORALLY DISINTEGRATING ORAL EVERY 6 HOURS PRN
Status: DISCONTINUED | OUTPATIENT
Start: 2019-01-01 | End: 2019-01-01

## 2019-01-01 RX ORDER — HALOPERIDOL 5 MG/ML
2 INJECTION INTRAMUSCULAR
Status: DISCONTINUED | OUTPATIENT
Start: 2019-01-01 | End: 2019-01-01

## 2019-01-01 RX ORDER — CEFAZOLIN SODIUM/WATER 2 G/20 ML
2 SYRINGE (ML) INTRAVENOUS ONCE
Status: COMPLETED | OUTPATIENT
Start: 2019-01-01 | End: 2019-01-01

## 2019-01-01 RX ORDER — SODIUM POLYSTYRENE SULFONATE 15 G/60ML
15 SUSPENSION ORAL; RECTAL ONCE
Status: COMPLETED | OUTPATIENT
Start: 2019-01-01 | End: 2019-01-01

## 2019-01-01 RX ORDER — MIDODRINE HYDROCHLORIDE 5 MG/1
TABLET ORAL
Qty: 270 TABLET | Refills: 1 | OUTPATIENT
Start: 2019-01-01

## 2019-01-01 RX ORDER — BUMETANIDE 1 MG/1
2 TABLET ORAL 2 TIMES DAILY
Status: DISCONTINUED | OUTPATIENT
Start: 2019-01-01 | End: 2019-01-01

## 2019-01-01 RX ORDER — ISOSORBIDE DINITRATE 5 MG/1
5 TABLET ORAL
Qty: 90 TABLET | Refills: 5 | Status: ON HOLD | OUTPATIENT
Start: 2019-01-01 | End: 2019-01-01

## 2019-01-01 RX ORDER — HYDRALAZINE HYDROCHLORIDE 10 MG/1
5 TABLET, FILM COATED ORAL 2 TIMES DAILY
COMMUNITY
End: 2019-01-01

## 2019-01-01 RX ORDER — HYDROCODONE BITARTRATE AND ACETAMINOPHEN 5; 325 MG/1; MG/1
1 TABLET ORAL EVERY 6 HOURS PRN
Qty: 20 TABLET | Refills: 0 | Status: SHIPPED | OUTPATIENT
Start: 2019-01-01 | End: 2019-01-01 | Stop reason: ALTCHOICE

## 2019-01-01 RX ORDER — FUROSEMIDE 10 MG/ML
60 INJECTION INTRAMUSCULAR; INTRAVENOUS ONCE
Status: COMPLETED | OUTPATIENT
Start: 2019-01-01 | End: 2019-01-01

## 2019-01-01 RX ORDER — GLYCOPYRROLATE 0.2 MG/ML
0.4 INJECTION, SOLUTION INTRAMUSCULAR; INTRAVENOUS
Status: DISCONTINUED | OUTPATIENT
Start: 2019-01-01 | End: 2019-01-01

## 2019-01-01 RX ORDER — MIDODRINE HYDROCHLORIDE 5 MG/1
5 TABLET ORAL 3 TIMES DAILY
Status: DISCONTINUED | OUTPATIENT
Start: 2019-01-01 | End: 2019-01-01

## 2019-01-01 RX ORDER — MIDODRINE HYDROCHLORIDE 5 MG/1
7.5 TABLET ORAL 3 TIMES DAILY
Status: DISCONTINUED | OUTPATIENT
Start: 2019-01-01 | End: 2019-01-01

## 2019-01-01 RX ORDER — DEXAMETHASONE SODIUM PHOSPHATE 10 MG/ML
INJECTION, SOLUTION INTRAMUSCULAR; INTRAVENOUS
Status: COMPLETED | OUTPATIENT
Start: 2019-01-01 | End: 2019-01-01

## 2019-01-01 RX ORDER — MIDODRINE HYDROCHLORIDE 5 MG/1
5 TABLET ORAL 3 TIMES DAILY
Qty: 90 TABLET | Refills: 0 | Status: SHIPPED | OUTPATIENT
Start: 2019-01-01 | End: 2019-01-01

## 2019-01-01 RX ORDER — CEFDINIR 300 MG/1
CAPSULE ORAL
Status: ON HOLD | COMMUNITY
End: 2019-01-01

## 2019-01-01 RX ORDER — PANTOPRAZOLE SODIUM 40 MG/1
40 TABLET, DELAYED RELEASE ORAL
Status: DISCONTINUED | OUTPATIENT
Start: 2019-01-01 | End: 2019-01-01

## 2019-01-01 RX ORDER — HYDROCODONE BITARTRATE AND ACETAMINOPHEN 5; 325 MG/1; MG/1
2 TABLET ORAL AS NEEDED
Status: DISCONTINUED | OUTPATIENT
Start: 2019-01-01 | End: 2019-01-01

## 2019-01-01 RX ORDER — HEPARIN SODIUM 1000 [USP'U]/ML
INJECTION, SOLUTION INTRAVENOUS; SUBCUTANEOUS
Status: COMPLETED
Start: 2019-01-01 | End: 2019-01-01

## 2019-01-01 RX ORDER — CARVEDILOL 25 MG/1
25 TABLET ORAL 2 TIMES DAILY
Status: DISCONTINUED | OUTPATIENT
Start: 2019-01-01 | End: 2019-01-01

## 2019-01-01 RX ORDER — FUROSEMIDE 10 MG/ML
40 INJECTION INTRAMUSCULAR; INTRAVENOUS DAILY
Status: DISCONTINUED | OUTPATIENT
Start: 2019-01-01 | End: 2019-01-01

## 2019-01-01 RX ORDER — HYDRALAZINE HYDROCHLORIDE 10 MG/1
10 TABLET, FILM COATED ORAL EVERY 8 HOURS SCHEDULED
Qty: 90 TABLET | Refills: 5 | Status: SHIPPED | OUTPATIENT
Start: 2019-01-01 | End: 2019-01-01

## 2019-01-01 RX ORDER — MIDODRINE HYDROCHLORIDE 5 MG/1
TABLET ORAL
Qty: 90 TABLET | Refills: 0 | Status: ON HOLD | OUTPATIENT
Start: 2019-01-01 | End: 2019-01-01

## 2019-01-01 RX ORDER — CLINDAMYCIN HYDROCHLORIDE 300 MG/1
300 CAPSULE ORAL ONCE
Status: COMPLETED | OUTPATIENT
Start: 2019-01-01 | End: 2019-01-01

## 2019-01-01 RX ORDER — ACETAMINOPHEN 325 MG/1
TABLET ORAL EVERY 6 HOURS PRN
Status: DISCONTINUED | OUTPATIENT
Start: 2019-01-01 | End: 2019-01-01

## 2019-01-01 RX ORDER — ATROPINE SULFATE 10 MG/ML
2 SOLUTION/ DROPS OPHTHALMIC EVERY 2 HOUR PRN
Status: DISCONTINUED | OUTPATIENT
Start: 2019-01-01 | End: 2019-01-01

## 2019-01-01 RX ORDER — HYDROCODONE BITARTRATE AND ACETAMINOPHEN 5; 325 MG/1; MG/1
1 TABLET ORAL EVERY 6 HOURS PRN
Status: DISCONTINUED | OUTPATIENT
Start: 2019-01-01 | End: 2019-01-01

## 2019-01-01 RX ORDER — DIGOXIN 125 MCG
125 TABLET ORAL DAILY
Status: DISCONTINUED | OUTPATIENT
Start: 2019-01-01 | End: 2019-01-01

## 2019-01-01 RX ORDER — HYDROCODONE BITARTRATE AND ACETAMINOPHEN 5; 325 MG/1; MG/1
1-2 TABLET ORAL EVERY 6 HOURS PRN
Qty: 30 TABLET | Refills: 0 | Status: SHIPPED | OUTPATIENT
Start: 2019-01-01 | End: 2019-01-01

## 2019-01-01 RX ORDER — METOPROLOL TARTRATE 50 MG/1
50 TABLET, FILM COATED ORAL EVERY 8 HOURS
Status: DISCONTINUED | OUTPATIENT
Start: 2019-01-01 | End: 2019-01-01

## 2019-01-01 RX ORDER — IBUPROFEN 200 MG
CAPSULE ORAL 3 TIMES DAILY
COMMUNITY
End: 2019-01-01

## 2019-01-01 RX ORDER — HYDROMORPHONE HYDROCHLORIDE 1 MG/ML
0.4 INJECTION, SOLUTION INTRAMUSCULAR; INTRAVENOUS; SUBCUTANEOUS EVERY 5 MIN PRN
Status: DISCONTINUED | OUTPATIENT
Start: 2019-01-01 | End: 2019-01-01

## 2019-01-01 RX ORDER — ISOSORBIDE DINITRATE 10 MG/1
5 TABLET ORAL
Status: DISCONTINUED | OUTPATIENT
Start: 2019-01-01 | End: 2019-01-01

## 2019-01-01 RX ORDER — LORAZEPAM 2 MG/ML
0.5 INJECTION INTRAMUSCULAR EVERY 4 HOURS PRN
Status: DISCONTINUED | OUTPATIENT
Start: 2019-01-01 | End: 2019-01-01

## 2019-01-01 RX ORDER — ASPIRIN 81 MG/1
324 TABLET, CHEWABLE ORAL ONCE
Status: COMPLETED | OUTPATIENT
Start: 2019-01-01 | End: 2019-01-01

## 2019-01-01 RX ORDER — IPRATROPIUM BROMIDE AND ALBUTEROL SULFATE 2.5; .5 MG/3ML; MG/3ML
3 SOLUTION RESPIRATORY (INHALATION) ONCE
Status: COMPLETED | OUTPATIENT
Start: 2019-01-01 | End: 2019-01-01

## 2019-01-01 RX ORDER — ONDANSETRON 2 MG/ML
4 INJECTION INTRAMUSCULAR; INTRAVENOUS ONCE AS NEEDED
Status: DISCONTINUED | OUTPATIENT
Start: 2019-01-01 | End: 2019-01-01

## 2019-01-01 RX ORDER — FEXOFENADINE HYDROCHLORIDE 60 MG/1
60 TABLET, FILM COATED ORAL AS NEEDED
COMMUNITY
End: 2019-01-01

## 2019-01-01 RX ORDER — CLINDAMYCIN PHOSPHATE 600 MG/50ML
600 INJECTION INTRAVENOUS ONCE
Status: DISCONTINUED | OUTPATIENT
Start: 2019-01-01 | End: 2019-01-01

## 2019-01-01 RX ORDER — FUROSEMIDE 10 MG/ML
20 INJECTION INTRAMUSCULAR; INTRAVENOUS
Status: DISCONTINUED | OUTPATIENT
Start: 2019-01-01 | End: 2019-01-01

## 2019-01-01 RX ORDER — TRAMADOL HYDROCHLORIDE 50 MG/1
TABLET ORAL
Qty: 50 TABLET | Refills: 1 | Status: ON HOLD | OUTPATIENT
Start: 2019-01-01 | End: 2019-01-01

## 2019-01-01 RX ORDER — HYDROCODONE BITARTRATE AND ACETAMINOPHEN 5; 325 MG/1; MG/1
1 TABLET ORAL EVERY 6 HOURS PRN
Qty: 15 TABLET | Refills: 0 | Status: ON HOLD | OUTPATIENT
Start: 2019-01-01 | End: 2019-01-01

## 2019-01-01 RX ORDER — LIDOCAINE HYDROCHLORIDE 10 MG/ML
INJECTION, SOLUTION EPIDURAL; INFILTRATION; INTRACAUDAL; PERINEURAL AS NEEDED
Status: DISCONTINUED | OUTPATIENT
Start: 2019-01-01 | End: 2019-01-01 | Stop reason: HOSPADM

## 2019-01-01 RX ORDER — SIMVASTATIN 40 MG
TABLET ORAL
Qty: 90 TABLET | Refills: 1 | OUTPATIENT
Start: 2019-01-01

## 2019-01-01 RX ORDER — DIGOXIN 125 MCG
125 TABLET ORAL DAILY
Status: COMPLETED | OUTPATIENT
Start: 2019-01-01 | End: 2019-01-01

## 2019-01-01 RX ORDER — TRAMADOL HYDROCHLORIDE 50 MG/1
TABLET ORAL
Qty: 50 TABLET | Refills: 0 | Status: SHIPPED
Start: 2019-01-01 | End: 2019-01-01

## 2019-01-01 RX ORDER — METOPROLOL SUCCINATE 25 MG/1
12.5 TABLET, EXTENDED RELEASE ORAL DAILY
Qty: 30 TABLET | Refills: 5 | Status: ON HOLD | OUTPATIENT
Start: 2019-01-01 | End: 2019-01-01

## 2019-01-01 RX ORDER — MORPHINE SULFATE 2 MG/ML
INJECTION, SOLUTION INTRAMUSCULAR; INTRAVENOUS
Status: DISPENSED
Start: 2019-01-01 | End: 2019-01-01

## 2019-01-01 RX ORDER — METOPROLOL SUCCINATE 25 MG/1
25 TABLET, EXTENDED RELEASE ORAL
Status: DISCONTINUED | OUTPATIENT
Start: 2019-01-01 | End: 2019-01-01

## 2019-01-01 RX ORDER — DILTIAZEM HYDROCHLORIDE 60 MG/1
60 TABLET, FILM COATED ORAL AS NEEDED
Status: DISCONTINUED | OUTPATIENT
Start: 2019-01-01 | End: 2019-01-01

## 2019-01-01 RX ORDER — SCOLOPAMINE TRANSDERMAL SYSTEM 1 MG/1
1 PATCH, EXTENDED RELEASE TRANSDERMAL
Status: DISCONTINUED | OUTPATIENT
Start: 2019-01-01 | End: 2019-01-01

## 2019-01-01 RX ORDER — HYDRALAZINE HYDROCHLORIDE 10 MG/1
5 TABLET, FILM COATED ORAL 2 TIMES DAILY
Status: DISCONTINUED | OUTPATIENT
Start: 2019-01-01 | End: 2019-01-01

## 2019-01-01 RX ORDER — LIDOCAINE HYDROCHLORIDE 10 MG/ML
0.5 INJECTION, SOLUTION INFILTRATION; PERINEURAL ONCE AS NEEDED
Status: ACTIVE | OUTPATIENT
Start: 2019-01-01 | End: 2019-01-01

## 2019-01-01 RX ORDER — MORPHINE SULFATE 10 MG/ML
6 INJECTION, SOLUTION INTRAMUSCULAR; INTRAVENOUS EVERY 10 MIN PRN
Status: DISCONTINUED | OUTPATIENT
Start: 2019-01-01 | End: 2019-01-01

## 2019-01-01 RX ORDER — NITROGLYCERIN 0.4 MG/1
0.4 TABLET SUBLINGUAL EVERY 5 MIN PRN
Status: DISCONTINUED | OUTPATIENT
Start: 2019-01-01 | End: 2019-01-01

## 2019-01-01 RX ORDER — FUROSEMIDE 20 MG/1
2 TABLET ORAL DAILY
COMMUNITY
Start: 2018-01-01

## 2019-01-01 RX ORDER — HYDROCODONE BITARTRATE AND ACETAMINOPHEN 5; 325 MG/1; MG/1
1 TABLET ORAL EVERY 4 HOURS PRN
Status: CANCELLED | OUTPATIENT
Start: 2019-01-01

## 2019-01-01 RX ORDER — METOPROLOL TARTRATE 50 MG/1
50 TABLET, FILM COATED ORAL EVERY 8 HOURS
Status: ON HOLD | COMMUNITY
End: 2019-01-01

## 2019-01-01 RX ORDER — BUMETANIDE 0.5 MG/1
0.5 TABLET ORAL DAILY
Qty: 60 TABLET | Refills: 0 | Status: ON HOLD | COMMUNITY
Start: 2019-01-01 | End: 2019-01-01

## 2019-01-01 RX ORDER — DIGOXIN 125 MCG
125 TABLET ORAL
Qty: 30 TABLET | Refills: 1 | Status: ON HOLD | OUTPATIENT
Start: 2019-01-01 | End: 2019-01-01

## 2019-01-01 RX ORDER — ACETAMINOPHEN 500 MG
1000 TABLET ORAL ONCE
Status: COMPLETED | OUTPATIENT
Start: 2019-01-01 | End: 2019-01-01

## 2019-01-01 RX ORDER — HYDROMORPHONE HYDROCHLORIDE 1 MG/ML
0.6 INJECTION, SOLUTION INTRAMUSCULAR; INTRAVENOUS; SUBCUTANEOUS EVERY 5 MIN PRN
Status: DISCONTINUED | OUTPATIENT
Start: 2019-01-01 | End: 2019-01-01

## 2019-01-01 RX ORDER — CYCLOBENZAPRINE HCL 10 MG
10 TABLET ORAL 3 TIMES DAILY
Qty: 30 TABLET | Refills: 1 | Status: SHIPPED | OUTPATIENT
Start: 2019-01-01 | End: 2019-01-01

## 2019-01-01 RX ORDER — FUROSEMIDE 10 MG/ML
40 INJECTION INTRAMUSCULAR; INTRAVENOUS
Status: DISCONTINUED | OUTPATIENT
Start: 2019-01-01 | End: 2019-01-01

## 2019-01-01 RX ORDER — DEXTROSE MONOHYDRATE 25 G/50ML
25 INJECTION, SOLUTION INTRAVENOUS ONCE
Status: COMPLETED | OUTPATIENT
Start: 2019-01-01 | End: 2019-01-01

## 2019-01-01 RX ORDER — HYDROCODONE BITARTRATE AND ACETAMINOPHEN 5; 325 MG/1; MG/1
2 TABLET ORAL EVERY 4 HOURS PRN
Status: CANCELLED | OUTPATIENT
Start: 2019-01-01

## 2019-01-01 RX ORDER — ASPIRIN 81 MG/1
TABLET ORAL
COMMUNITY
Start: 2009-12-07

## 2019-01-01 RX ORDER — METOCLOPRAMIDE 10 MG/1
10 TABLET ORAL ONCE
Status: COMPLETED | OUTPATIENT
Start: 2019-01-01 | End: 2019-01-01

## 2019-01-01 RX ORDER — ALLOPURINOL 100 MG/1
100 TABLET ORAL DAILY
Qty: 30 TABLET | Refills: 5 | Status: ON HOLD | OUTPATIENT
Start: 2019-01-01 | End: 2019-01-01

## 2019-01-01 RX ORDER — HALOPERIDOL 5 MG/ML
0.25 INJECTION INTRAMUSCULAR ONCE AS NEEDED
Status: DISCONTINUED | OUTPATIENT
Start: 2019-01-01 | End: 2019-01-01

## 2019-01-01 RX ORDER — LIDOCAINE HYDROCHLORIDE 10 MG/ML
INJECTION, SOLUTION EPIDURAL; INFILTRATION; INTRACAUDAL; PERINEURAL
Status: DISCONTINUED
Start: 2019-01-01 | End: 2019-01-01 | Stop reason: WASHOUT

## 2019-01-01 RX ORDER — BLOOD SUGAR DIAGNOSTIC
STRIP MISCELLANEOUS
Qty: 300 STRIP | Refills: 0 | Status: ON HOLD | OUTPATIENT
Start: 2019-01-01 | End: 2019-01-01

## 2019-01-01 RX ORDER — LEVOTHYROXINE SODIUM 0.03 MG/1
25 TABLET ORAL
Status: DISCONTINUED | OUTPATIENT
Start: 2019-01-01 | End: 2019-01-01

## 2019-01-01 RX ORDER — ATORVASTATIN CALCIUM 40 MG/1
40 TABLET, FILM COATED ORAL NIGHTLY
Status: DISCONTINUED | OUTPATIENT
Start: 2019-01-01 | End: 2019-01-01

## 2019-01-01 RX ORDER — TRAMADOL HYDROCHLORIDE 50 MG/1
TABLET ORAL
Qty: 50 TABLET | Refills: 0 | Status: SHIPPED | OUTPATIENT
Start: 2019-01-01 | End: 2019-01-01

## 2019-01-01 RX ORDER — IPRATROPIUM BROMIDE AND ALBUTEROL SULFATE 2.5; .5 MG/3ML; MG/3ML
3 SOLUTION RESPIRATORY (INHALATION) EVERY 6 HOURS PRN
Status: DISCONTINUED | OUTPATIENT
Start: 2019-01-01 | End: 2019-01-01

## 2019-01-01 RX ORDER — FAMOTIDINE 20 MG/1
20 TABLET ORAL ONCE
Status: COMPLETED | OUTPATIENT
Start: 2019-01-01 | End: 2019-01-01

## 2019-01-01 RX ORDER — METOPROLOL SUCCINATE 50 MG/1
75 TABLET, EXTENDED RELEASE ORAL
Qty: 60 TABLET | Refills: 5 | Status: ON HOLD | COMMUNITY
Start: 2019-01-01 | End: 2019-01-01

## 2019-01-01 RX ORDER — CHLORHEXIDINE GLUCONATE 4 G/100ML
30 SOLUTION TOPICAL
Status: COMPLETED | OUTPATIENT
Start: 2019-01-01 | End: 2019-01-01

## 2019-01-01 RX ORDER — HYDROCODONE BITARTRATE AND ACETAMINOPHEN 5; 325 MG/1; MG/1
1 TABLET ORAL EVERY 6 HOURS PRN
Status: ON HOLD | COMMUNITY
End: 2019-01-01

## 2019-01-01 RX ORDER — MOMETASONE FUROATE 1 MG/G
1 CREAM TOPICAL DAILY PRN
Qty: 45 G | Refills: 1 | Status: ON HOLD | OUTPATIENT
Start: 2019-01-01 | End: 2019-01-01

## 2019-01-01 RX ORDER — BUMETANIDE 1 MG/1
0.5 TABLET ORAL DAILY
Status: DISCONTINUED | OUTPATIENT
Start: 2019-01-01 | End: 2019-01-01

## 2019-01-01 RX ORDER — MORPHINE SULFATE 2 MG/ML
2 INJECTION, SOLUTION INTRAMUSCULAR; INTRAVENOUS EVERY 10 MIN PRN
Status: DISCONTINUED | OUTPATIENT
Start: 2019-01-01 | End: 2019-01-01

## 2019-01-01 RX ORDER — PREDNISONE 50 MG/1
50 TABLET ORAL
COMMUNITY
End: 2019-01-01 | Stop reason: ALTCHOICE

## 2019-01-01 RX ORDER — ASCORBIC ACID, THIAMINE, RIBOFLAVIN, NIACINAMIDE, PYRIDOXINE, FOLIC ACID, COBALAMIN, BIOTIN, PANTOTHENIC ACID 100; 1.5; 1.7; 20; 10; 1; 6; 300; 1 MG/1; MG/1; MG/1; MG/1; MG/1; MG/1; UG/1; UG/1; MG/1
1 TABLET, COATED ORAL DAILY
Status: DISCONTINUED | OUTPATIENT
Start: 2019-01-01 | End: 2019-01-01

## 2019-01-01 RX ORDER — DIPHENHYDRAMINE HYDROCHLORIDE 50 MG/ML
25 INJECTION INTRAMUSCULAR; INTRAVENOUS EVERY 6 HOURS PRN
Status: DISCONTINUED | OUTPATIENT
Start: 2019-01-01 | End: 2019-01-01

## 2019-01-01 RX ORDER — FUROSEMIDE 10 MG/ML
40 INJECTION INTRAMUSCULAR; INTRAVENOUS EVERY 8 HOURS PRN
Status: DISCONTINUED | OUTPATIENT
Start: 2019-01-01 | End: 2019-01-01

## 2019-01-01 RX ORDER — SIMVASTATIN 40 MG
TABLET ORAL
Qty: 90 TABLET | Refills: 1 | Status: ON HOLD | OUTPATIENT
Start: 2019-01-01 | End: 2019-01-01

## 2019-01-01 RX ORDER — IPRATROPIUM BROMIDE AND ALBUTEROL SULFATE 2.5; .5 MG/3ML; MG/3ML
3 SOLUTION RESPIRATORY (INHALATION) 2 TIMES DAILY
Status: DISCONTINUED | OUTPATIENT
Start: 2019-01-01 | End: 2019-01-01

## 2019-01-01 RX ORDER — DOBUTAMINE HYDROCHLORIDE 200 MG/100ML
INJECTION INTRAVENOUS CONTINUOUS
Status: DISCONTINUED | OUTPATIENT
Start: 2019-01-01 | End: 2019-01-01

## 2019-01-01 RX ORDER — DEXTROSE MONOHYDRATE 25 G/50ML
50 INJECTION, SOLUTION INTRAVENOUS
Status: DISCONTINUED | OUTPATIENT
Start: 2019-01-01 | End: 2019-01-01

## 2019-01-01 RX ORDER — METOLAZONE 2.5 MG/1
TABLET ORAL
COMMUNITY
Start: 2018-01-01

## 2019-01-01 RX ADMIN — ROPIVACAINE HYDROCHLORIDE 30 ML: 5 INJECTION, SOLUTION EPIDURAL; INFILTRATION; PERINEURAL at 15:11:00

## 2019-01-01 RX ADMIN — DEXAMETHASONE SODIUM PHOSPHATE 4 MG: 10 INJECTION, SOLUTION INTRAMUSCULAR; INTRAVENOUS at 15:11:00

## 2019-01-01 RX ADMIN — PHENYLEPHRINE HCL 100 MCG: 10 MG/ML VIAL (ML) INJECTION at 15:23:00

## 2019-01-01 RX ADMIN — PHENYLEPHRINE HCL 100 MCG: 10 MG/ML VIAL (ML) INJECTION at 14:36:00

## 2019-01-01 RX ADMIN — PHENYLEPHRINE HCL 100 MCG: 10 MG/ML VIAL (ML) INJECTION at 14:38:00

## 2019-01-01 RX ADMIN — SODIUM CHLORIDE: 9 INJECTION, SOLUTION INTRAVENOUS at 15:38:00

## 2019-01-01 RX ADMIN — PHENYLEPHRINE HCL 100 MCG: 10 MG/ML VIAL (ML) INJECTION at 14:08:00

## 2019-01-01 RX ADMIN — MIDAZOLAM HYDROCHLORIDE 1 MG: 1 INJECTION INTRAMUSCULAR; INTRAVENOUS at 15:11:00

## 2019-01-01 RX ADMIN — CEFAZOLIN SODIUM/WATER 2 G: 2 G/20 ML SYRINGE (ML) INTRAVENOUS at 13:47:00

## 2019-01-03 NOTE — H&P
NURSING INTAKE COMMENTS: Patient presents with:  Knee Pain: C/o tenderness on the inner portion of the left knee. She also notices the area being warm to the touch, and swelling. Stts she slipped outside 5 weeks ago and fell right on top of the knee.   David Jewell Dr. Benitez Joshi at [facility] Left 6/4/12    Dr. Benitez Joshi at [facility]    • COLONOSCOPY N/A 4/4/2018    Performed by Marycarmen Moore MD at [address] [place] [address] ENDOSCOPY   • ESOPHAGOGASTRODUODENOSCOPY (EGD) N/A 4/4 (70-30) 100 UNIT/ML Subcutaneous Suspension Pen-injector Inject 70 Units into the skin. Disp:  Rfl:    digoxin 0.125 MG Oral Tab Take 0.125 mg by mouth. Disp:  Rfl:    carvedilol 12.5 MG Oral Tab Take 25 mg by mouth 2 (two) times daily.   Disp:  Rfl:    asp Oral Tab Take 1 tablet (2.5 mg total) by mouth See Admin Instructions for 8 days. 1 tab thirty min before am furosemide as directed on Tues & Fri Disp: 8 tablet Rfl: 0     No current facility-administered medications on file prior to visit.        Bupropion Weight Concern: Not Asked        Special Diet: Not Asked        Back Care: Not Asked        Exercise: Not Asked        Bike Helmet: Not Asked        Seat Belt: Not Asked        Self-Exams: Not Asked    Social History Narrative      Not on file      A compr Follow-up as needed.

## 2019-01-03 NOTE — PROGRESS NOTES
HPI:    Patient ID: Kellee Carranza is a 68year old female. HPI   Patient presents today for follow-up of left leg pain and swelling. She sustained a fall in early December and was seen by another IM provider at that time.   She underwent venous Doppler Neurological: Negative for weakness, light-headedness and headaches. Hematological: Negative for adenopathy. Psychiatric/Behavioral: The patient is not nervous/anxious.                Current Outpatient Medications:  allopurinol 100 MG Oral Tab Take 1 lisinopril 2.5 MG Oral Tab Take 1 tablet (2.5 mg total) by mouth daily. Disp: 30 tablet Rfl: 0   Glucose Blood (CONTOUR NEXT TEST) In Vitro Strip 3 each by Other route daily.  Dx code E11.65 IDDM Disp: 150 each Rfl: 3   Insulin Pen Needle ([employer] PEN NE straight  Iodine (Topical)        RASH  Povidone Iodine         ITCHING    Comment:Rash/Itching following surgical prep  Iodine  [Gnp Iodine]    UNKNOWN   PHYSICAL EXAM:   Physical Exam   Nursing note and vitals reviewed.    Constitutional: She is oriented compression stockings. - OP REFERRAL TO LYMPHEDEMA CLINIC    2. Pain and swelling of left knee  Patient here with ongoing pain and swelling of the left knee after sustaining a fall over 1 month ago and having a concurrent gout attack.   She has completed t

## 2019-01-03 NOTE — TELEPHONE ENCOUNTER
Patient will be seen today by physician's assistant, Modesta Jaquez. I spoke with patient briefly in the hallway before she saw the physician's assistant. Mayank and I discussed the test results and plan for the patient.

## 2019-01-09 NOTE — TELEPHONE ENCOUNTER
Patient returned call for follow up, reports had made phone call to schedule her [address] ordered by PAULINA ALCANTAR, wanted to complete at the [address] [place] [address] location closest to her, however their first opening is 1/21/19, she also contacted [place] [phone] and [place] locations a

## 2019-01-09 NOTE — TELEPHONE ENCOUNTER
Action Requested: Summary for Provider     []  Critical Lab, Recommendations Needed  [x] Need Additional Advice  []   FYI    []   Need Orders  [] Need Medications Sent to Pharmacy  []  Other     SUMMARY:Pt stated she did not mention the pain she have had a

## 2019-01-09 NOTE — TELEPHONE ENCOUNTER
Patient contacted. She is able to go today, prefers [facility] location. (she is 15 min away), otherwise, will need to be seen at [facility]. Last meal was at 10:30am.     Called [facility], no response.      Called hospital US dept, spoke to Lady Dooley who provided me with 3:30

## 2019-01-09 NOTE — TELEPHONE ENCOUNTER
Order for [address] changed to STAT. Please obtain prior auth from insurance if needed.

## 2019-01-10 NOTE — TELEPHONE ENCOUNTER
Result Notes for US ABDOMEN LIMITED (IOU=[id number])     Notes recorded by Trang Trujillo PA-C on 1/9/2019 at 4:58 PM CST  Left message for patient regarding results which show diffuse sludge and positive sonographic [place] Pall sign.  Given these findings and her

## 2019-01-23 NOTE — TELEPHONE ENCOUNTER
Carrie Noonan, pt stts that she stts that she feels that she is always cold and tired recently. She also reports decreased energy. She has had transfusions in the past.  Was on iron pills a while ago, stopped taking them in July (pt not 100% sure).    Last CBC 10/

## 2019-01-25 NOTE — TELEPHONE ENCOUNTER
Patient called and was informed with understanding. She will have to have someone get the antibiotic and bandages. Will call back if needed.

## 2019-01-25 NOTE — TELEPHONE ENCOUNTER
Action Requested: Summary for Provider     []  Critical Lab, Recommendations Needed  [] Need Additional Advice  []   FYI    []   Need Orders  [] Need Medications Sent to Pharmacy  []  Other     SUMMARY: Pt seeking recommendations for wound on left ankle.

## 2019-01-25 NOTE — TELEPHONE ENCOUNTER
Keep the leg elevated when possible. Clean the wound daily with warm soapy water, apply topical OTC antibiotic ointment and clean bandage.  If symptoms worsen or if new symptoms develop such as redness/red streaks, pus-like drainage, or swelling in the next

## 2019-01-25 NOTE — TELEPHONE ENCOUNTER
Pt called back. Pt is asking what could have caused her symptoms and asking if Lavelle TINSLEY would call her.

## 2019-01-25 NOTE — TELEPHONE ENCOUNTER
Spoke to patient. She has 1 prominent \"boil\" on the left lower leg that has been draining clear fluid for the last couple days. She has been applying topical antibiotic ointment covering with a bandage but continues to drain.   She also noticed a few sm

## 2019-01-28 NOTE — TELEPHONE ENCOUNTER
Action Requested: Summary for Provider     []  Critical Lab, Recommendations Needed  [] Need Additional Advice  [x]   FYI    []   Need Orders  [] Need Medications Sent to Pharmacy  []  Other     SUMMARY: Patient calling with complaint of \"small pouches\"

## 2019-01-29 PROBLEM — R06.00 EXERTIONAL DYSPNEA: Status: ACTIVE | Noted: 2019-01-01

## 2019-01-29 PROBLEM — I50.9 ACUTE ON CHRONIC CONGESTIVE HEART FAILURE (HCC): Status: ACTIVE | Noted: 2019-01-01

## 2019-01-29 PROBLEM — R18.8 OTHER ASCITES: Status: ACTIVE | Noted: 2019-01-01

## 2019-01-29 PROBLEM — R06.09 EXERTIONAL DYSPNEA: Status: ACTIVE | Noted: 2019-01-01

## 2019-01-29 PROBLEM — R06.01 ORTHOPNEA: Status: ACTIVE | Noted: 2019-01-01

## 2019-01-29 PROBLEM — I50.9 ACUTE ON CHRONIC CONGESTIVE HEART FAILURE, UNSPECIFIED HEART FAILURE TYPE (HCC): Status: ACTIVE | Noted: 2019-01-01

## 2019-01-29 NOTE — PROGRESS NOTES
HPI:    Patient ID: Brandee Pugh is a 68year old female.     HPI   Patient with history of hypertension, hyperlipidemia, atrial fibrillation s/p watchman 5/25/18, CHF, DM2, GI bleed presents today complaining of lower extremity swelling, weeping, and abdo Take 1 tablet (100 mg total) by mouth daily. Disp: 30 tablet Rfl: 5   HYDROcodone-acetaminophen (NORCO) 5-325 MG Oral Tab Take 1 tablet by mouth every 6 (six) hours as needed for Pain.  Disp: 15 tablet Rfl: 0   CARVEDILOL 12.5 MG Oral Tab TAKE 2 TABLETS BY (MIRALAX) Oral Powder Take 17 g by mouth as needed. Disp:  Rfl:    aspirin 81 MG Oral Chew Tab Chew 81 mg by mouth daily. Disp:  Rfl:    Ascorbic Acid (VITAMIN C) 100 MG Oral Tab Take 100 mg by mouth daily.    Disp:  Rfl:    TRIAMCINOLONE ACETONIDE 0.1 % present. Edema (pitting edema bilateral lower legs extending as high as the mid calf with weeping ) present. Pulmonary/Chest: Effort normal. She has no wheezes. She has rales (soft bibasilar crackles). Abdominal: Soft.  Bowel sounds are normal. She exhi

## 2019-01-29 NOTE — TELEPHONE ENCOUNTER
LOV 7/2018. Had appt tomorrow but cancelled due to clinic closure. She is aware of need to reschedule.

## 2019-01-29 NOTE — HISTORICAL OFFICE NOTE
Slava Guaman  : 1945  ACCOUNT:  [id number]  [phone]  PCP: Dr. Nick Poole     TODAY'S DATE: 2018  DICTATED BY:  [Dr. Zachary Coates      HPI:    [On 2018, Royer Atkins, a 68year old female, presented with no interim cardiac c below    VITAL SIGNS: [B/P - 110/60 , Pulse - 70, Weight -  200, Height -   61 , BMI - 37.8 ]    CONS: heavy set. WEIGHT: Discussed and diet, exercise program prescribed. HEAD/FACE: no trauma and normocephalic.  EYES: conjunctivae not injected and no xanthe weeks  BMP both were visit to heart failure clinic in 1 week  Avoid added salt   take metolazone twice a week as needed  Look into support stockings  Keep feet elevated when sitting and try walking]    FOLLOWUP: [Return visit in 4 Weeks]    PRESCRIPTIONS:

## 2019-01-29 NOTE — ED NOTES
Orders for admission, patient is aware of plan and ready to go upstairs.  Any questions, please call ED LILA Brito at extension [phone]

## 2019-01-29 NOTE — CONSULTS
[facility]    Cardiology Consultation    [place] Location: [address] [place] [address] Drive    1945 MRN [id number]   Consulting Date 2019 [facility] [id number]   Consulting Physician Evelyn Bhatia MD Attending Physician Ruddy Mariee Right 5/14/12    Dr. Blanco Males at [address] [place] [address] Left 6/4/12    Dr. Blanco Males at [facility]    • COLONOSCOPY N/A 4/4/2018    Performed by Dwayne Madrigal MD at [facility] ENDOSCOPY   • ESOPHAGOGASTRODUODENOSCOPY Pain. Disp: 15 tablet Rfl: 0   CARVEDILOL 12.5 MG Oral Tab TAKE 2 TABLETS BY MOUTH IN THE MORNING AND IN THE EVENING Disp: 360 tablet Rfl: 0   SEAN CONTOUR TEST In Vitro Strip TEST THREE TIMES DAILY AS DIRECTED Disp: 300 strip Rfl: 0   MONTELUKAST SODIUM 3350 (MIRALAX) Oral Powder Take 17 g by mouth as needed. Disp:  Rfl:    aspirin 81 MG Oral Chew Tab Chew 81 mg by mouth daily. Disp:  Rfl:    Ascorbic Acid (VITAMIN C) 100 MG Oral Tab Take 100 mg by mouth daily.    Disp:  Rfl:    TRIAMCINOLONE ACETONIDE 0 01/29/2019    CL 99 01/29/2019    CO2 29 01/29/2019     01/29/2019    CA 8.7 01/29/2019    ALB 3.9 01/29/2019    ALKPHO 54 01/29/2019    BILT 0.9 01/29/2019    TP 7.2 01/29/2019    AST 20 01/29/2019    ALT 11 01/29/2019    MG 2.6 01/29/2019       IM

## 2019-01-29 NOTE — ED INITIAL ASSESSMENT (HPI)
Patient presents with shortness of breath and water retention. Patient states she's gained approximately 20 lbs since 01/01/2019.

## 2019-01-29 NOTE — ED PROVIDER NOTES
Patient Seen in: [facility] AND [facility] Emergency Department    History   No chief complaint on file. Stated Complaint: SOB, edema. coming for diuretics/admission?      HPI    67 yo F with PMH CAD c/b CHF (25-30% EF) on digoxin, HTN, HL, COPD, afib s/p antico ESOPHAGOGASTRODUODENOSCOPY (EGD) N/A 4/4/2018    Performed by Chanda Yanez MD at [address] [place] [address] ENDOSCOPY   • ESOPHAGOGASTRODUODENOSCOPY (EGD) N/A 4/3/2018    Performed by Chanda Yanez MD at [address] [place] [address] ENDOSCOPY   • OTHER SURGICAL HISTORY 2.5 MG Oral Tab,  Take 1 tablet (2.5 mg total) by mouth daily. Glucose Blood (CONTOUR NEXT TEST) In Vitro Strip,  3 each by Other route daily.  Dx code E11.65 IDDM   Insulin Pen Needle ([employer] PEN NEEDLES) 32G X 4 MM Does not apply Misc,  Inject 2 times 01/29/19 1527 70   Resp 01/29/19 1527 20   Temp 01/29/19 1708 98.4 °F (36.9 °C)   Temp src 01/29/19 1708 Temporal   SpO2 01/29/19 1525 (!) 88 %   O2 Device 01/29/19 1525 None (Room air)       Current:/59   Pulse 70   Resp 20   SpO2 94%         Physic Abnormality         Status                     ---------                               -----------         ------                     CBC W/ DIFFERENTIAL[[id number]]          Abnormal            Final result                 Please view results for these mass/lesion. Normal color flow. 3. Moderate amount of ascites, nonspecific may reflect sequela of right heart failure although the possibility of cirrhosis is raised. Clinical and laboratory correlation recommended.  4. Small gallbladder with abnormal wall Monitor Interpretation: Pulse Readings from Last 1 Encounters:  01/29/19 : 69  , AV paced,      Evaluation for weeks of worsening twenty pound weight gain now with several days of worsening exertional/orthopneic dyspnea without pain.  Clinically and by hist

## 2019-01-29 NOTE — TELEPHONE ENCOUNTER
Pt is requesting refills for insulin. PLs call. Thank you.       Current Outpatient Medications:  HUMULIN 70/30 KWIKPEN (70-30) 100 UNIT/ML Subcutaneous Suspension Pen-injector INJECT 70 UNITS UNDER THE SKIN TWICE DAILY Disp: 120 mL Rfl: 0

## 2019-01-30 NOTE — PROGRESS NOTES
[facility]    Progress Note    [place] Patient Status:  Inpatient    1945 MRN [id number]   Location [facility] 3W/SW Attending Geni Dick MD   Hosp Day # 1 PCP Katie Roberts MD         Assessment and Plan: lesions              Scheduled Meds:    • Insulin Aspart Pen  1-7 Units Subcutaneous TID CC   • furosemide  40 mg Intravenous BID   • allopurinol  100 mg Oral Daily   • aspirin  81 mg Oral Daily   • carvedilol  25 mg Oral BID   • Montelukast Sodium  10 mg

## 2019-01-30 NOTE — CONSULTS
[facility] [place] [facility]    Report of Consultation    Iesha Monique Patient Status:  Inpatient    1945 MRN [id number]   Location [facility] 3W/SW Attending Katrin Banegas MD   Hosp Day # 1 PCP Davis Pal MD     Date of Admission: History  Past Surgical History:   Procedure Laterality Date   • CABG  2010   • CAPSULE ENDOSCOPY N/A 4/5/2018    Performed by Nicko Oconnor MD at [facility]  2012   • CARDIAC PACEMAKER PLACEMENT  2012 Oral Q15 Min PRN   glucose (DEX4) oral liquid 15 g 15 g Oral Q15 Min PRN   Insulin Aspart Pen (NOVOLOG) 100 UNIT/ML flexpen 1-7 Units 1-7 Units Subcutaneous TID CC   Atropine Sulfate 0.1 MG/ML injection 0.5 mg 0.5 mg Intravenous PRN   nitroGLYCERIN (NITROS Tab TAKE 1 TABLET BY MOUTH EVERY EVENING   lisinopril 2.5 MG Oral Tab Take 1 tablet (2.5 mg total) by mouth daily. aspirin 81 MG Oral Chew Tab Chew 81 mg by mouth daily.      SEAN CONTOUR TEST In Vitro Strip TEST THREE TIMES DAILY AS DIRECTED   Glucose B bruising        Physical Exam:   Blood pressure 101/43, pulse 69, temperature 97.6 °F (36.4 °C), temperature source Oral, resp. rate 18, height 5' 2\" (1.575 m), weight 222 lb 8 oz (100.9 kg), SpO2 96 %, not currently breastfeeding.     Constitutional: Arou placement. Recent watchman procedure in 2018.  -Transient hypotension noted. Improvement after 250 cc bolus. Closely monitor I/O. Hold antihypertensives at this time. Careful diuresis of pressure tolerating.  -Hypoglycemia noticed.   Improvement after

## 2019-01-30 NOTE — H&P
[facility]    PATIENT'S NAME: Nisreen Vasquez   ATTENDING PHYSICIAN: Adriano Garcia MD   PATIENT ACCOUNT#:   [id number]    LOCATION:  [address] [place] [address] Rj Benzonia RECORD #:   [id number]       YOB: 1945  ADMISSION DATE:       01/29/2019 see medication reconciliation list.      ALLERGIES:  BuSpar and IV contrast.      FAMILY HISTORY:  Father had cerebrovascular accident and mother had pneumonia. SOCIAL HISTORY:  The patient denies tobacco, alcohol, or drug use.   Lives by herself and i atrial fibrillation. 4.   Chronic renal disease. The patient will be admitted to telemetry floor, IV Lasix, heart failure protocol. Cardiology consult. Wound Service to evaluate patient. Hold her digoxin, continue her home medications.   The los

## 2019-01-30 NOTE — CONSULTS
[address] [facility] CONSULT      Patient: Samuel Friday Date: 2019     : 1945 Attending: Kota Porras MD   68year old female      A/P:  Acute on chronic HFrEF (EF 25-30%) s/p ICD likely ischemic etiology; history of CAD s/p for decompensated HF she was sent to ER and admitted for further evaluation and management. She had a fall last month and has been basically immobile. Her fall was outside after she tripped. She has gained about 20 lbs since.  She has received lasix 40 Performed by Louis Harvey MD at [facility] ENDOSCOPY   • ESOPHAGOGASTRODUODENOSCOPY (EGD) N/A 4/3/2018    Performed by Louis Harvey MD at [facility] ENDOSCOPY   • OTHER SURGICAL HISTORY      watchmand device 2018   • Stacey. Lizette Marin 62 listed in HPI (history of present illness): A comprehensive 10 point + review of systems is otherwise negative.     Medications/Infusions:  Scheduled:   • sodium chloride       • Insulin Aspart Pen  1-7 Units Subcutaneous TID CC   • furosemide  40 mg Anusha Byrd 01/30/2019    .0 01/30/2019    BUN 85 (H) 01/30/2019    CO2 31 01/30/2019    MG 2.6 (H) 01/30/2019    INR 1.25 (H) 05/26/2018    PTT 32.2 04/03/2018    AST 20 01/29/2019    TP 7.2 01/29/2019    TSH 3.45 06/27/2016     CXR 1/29/19     =====  CONCLUSI Pacer wire noted     in the right ventricle. Systolic function was mildly reduced. 6. Right atrium: The atrium was moderately to markedly dilated. Pacer wire     noted in right atrium.   7. Atrial septum: There was a residual atrial shunt post-transseptal

## 2019-01-30 NOTE — TREATMENT PLAN
Small open blisters to L leg, sites measured and are in clients chart. This RN will continue to monitor client.

## 2019-01-30 NOTE — PLAN OF CARE
Call from rn re: hypotension- pt sbp 75/40- pt also experiencing hypoglycemia and some lethargy- pt seen and examined -she is sleepy but responds to questioning- she denies any acute complaints at this time-  s/p 250cc fluid bolus with no increased b/p res

## 2019-01-30 NOTE — WOUND PROGRESS NOTE
WOUND CARE NOTE      PLAN   Recommendations:  Dietary consult for recommendations for nutrition to optimize wound healing  Heels elevated using pillows, heel wedge or heel boots to offload heels    Wound(s)  Location: LEFT LOWER POSTERIOR LEG  Cleansing

## 2019-01-30 NOTE — PROGRESS NOTES
[facility]    Progress Note    Bernardo Anaya Patient Status:  Inpatient    1945 MRN [id number]   Location [facility] 3W/SW Attending Madelaine Sheth MD   Hosp Day # 1 PCP John Dowling MD       Subjective:   Lynnette Turner Q5 Min PRN  •  Normal Saline Flush 0.9 % injection 10 mL, 10 mL, Intravenous, PRN  •  furosemide (LASIX) injection 40 mg, 40 mg, Intravenous, BID  •  allopurinol (ZYLOPRIM) tab 100 mg, 100 mg, Oral, Daily  •  aspirin chewable tab 81 mg, 81 mg, Oral, Daily 01/29/19   1607  01/30/19   0538   GLU  125*  61*   BUN  81*  85*   CREATSERUM  2.21*  2.24*   GFRAA  25*  24*   GFRNAA  21*  21*   CA  8.7  8.7   NA  140  141   K  4.7  5.2*   CL  99  99   CO2  29  31         Imaging:   Xr Chest Ap Portable  (cpt=32372)

## 2019-01-30 NOTE — CARDIAC REHAB
CARDIAC REHAB HEART FAILURE EDUCATION    Handouts provided and reviewed: CHF Booklet. Activity: Chair for all meals: yes        Disease Process: Disease process reviewed. Reviewed the following: DAILY WEIGHT MONITORING: yes.  Does not do daily      S

## 2019-01-30 NOTE — PLAN OF CARE
Problem: Patient Centered Care  Goal: Patient preferences are identified and integrated in the patient's plan of care  Interventions:  - What would you like us to know as we care for you? Its very difficult for me to eat healthy.    - Provide timely, comple vasoactive medications to optimize hemodynamic stability  - Monitor arterial and/or venous puncture sites for bleeding and/or hematoma  - Assess quality of pulses, skin color and temperature  - Assess for signs of decreased coronary artery perfusion - ex.

## 2019-01-30 NOTE — PROGRESS NOTES
[facility] Note:  Therapeutic Interchange    Alphonse Delgadillo was previously taking insulin NPH & regular (Humulin 70/30) 40 units subq twice daily at home prior to admission.     Per therapeutic interchange, the patient has been switched to insulin detemir

## 2019-01-31 NOTE — PROGRESS NOTES
[facility]    Progress Note    Tavon Baxter Patient Status:  Inpatient    1945 MRN [id number]   Location [facility] 2W/SW Attending [place], [address] [facility] Day # 2 PCP Anthony Pereira MD       Subjective:   Tavon Baxter Normal Saline Flush 0.9 % injection 3 mL 3 mL Intravenous PRN   acetaminophen (TYLENOL) tab 650 mg 650 mg Oral Q6H PRN   ondansetron HCl (ZOFRAN) injection 4 mg 4 mg Intravenous Q6H PRN   dextrose 50 % injection 50 mL 50 mL Intravenous PRN   Glucose-Stephanie 10.5 (L) 01/31/2019    HGB 10.4 (L) 01/30/2019    HGB 10.3 (L) 01/29/2019    Lab Results   Component Value Date    .0 01/31/2019    .0 01/30/2019    .0 01/29/2019       Recent Labs   Lab  01/29/19   1607  01/30/19   0538  01/31/19   04 patients current state of illness, I anticipate that, after discharge, patient will require TBD.

## 2019-01-31 NOTE — CM/SW NOTE
Care Coordination/BPCI    Progression of Care: Hospital Day # 2  Saw pt at bedside to assess her progress and determine her discharge planning needs and explain the BPCI/Medicare program. Patient agreed with phone f/u for 3 months from Christopher Ellsworth follow-up needed?  CM;MIRI Chand, [address]    Clinical Transitions Leader  [phone]

## 2019-01-31 NOTE — PROGRESS NOTES
[facility]    Cardiology - [facility]  Progress Note    Leno Li Patient Status:  Inpatient    1945 MRN [id number]   Location [facility] 2W/SW Attending Leonardo Alexis, [address] [facility] Day # 2 PCP MD Richie Simmons Results   Component Value Date    WBC 8.4 01/31/2019    HGB 10.5 (L) 01/31/2019    HCT 37.3 01/31/2019    .0 01/31/2019    CREATSERUM 2.58 (H) 01/31/2019    BUN 91 (H) 01/31/2019     01/31/2019    K 5.7 (H) 01/31/2019    CL 96 01/31/2019    CO

## 2019-01-31 NOTE — PROGRESS NOTES
[place] [facility] - [facility]     Progress Note        Jose Hernadez Patient Status:  Inpatient    1945 MRN [id number]   Location [facility] 2W/SW Attending Jenny Quintanilla, [address] [facility] Day # 2 PCP Jesus Lantigua MD       Subjective:   Patient (NORCO) 5-325 MG per tab 1 tablet 1 tablet Oral Q6H PRN   Montelukast Sodium (SINGULAIR) tab 10 mg 10 mg Oral Nightly   Pantoprazole Sodium (PROTONIX) EC tab 40 mg 40 mg Oral BID AC   PEG 3350 (MIRALAX) powder packet 17 g 17 g Oral PRN   atorvastatin (LIPI Hyperlipidemia           Plan   -Patient presents with evidence of increased dyspnea and lower extremity edema.   Clinical presentation consistent with acute on chronic systolic heart failure.  -Underlying history of ejection fraction 25-30% status post AIC

## 2019-01-31 NOTE — PROGRESS NOTES
Patient received from floor. alert and oriented. pt is on continues Bipap.lasix drip started per order. pt refuse to have serna catheter. bladder scan done and noted 450 ml urine. pt want to try to urinate in bathroom. reoriented needs off Serna catheter and pt

## 2019-02-01 NOTE — IMAGING NOTE
RADIOLOGY TEAM IN RM. 202 AT 0235.  SCANS COMPLETED BY Carson Charles, [address] includes the [facility] [address] TECHNOLOGIST. HX REVIEWED; INFORMED CONSENT IN CHART.     PLATELETS =  881    PT = 16.9   INR = 1.4    TIME OUT TAKEN AT 1245.    1300:  CHLORO-PREP AS SKIN PREP, STERILE DRAPE APPLI

## 2019-02-01 NOTE — PROGRESS NOTES
Guadalupe Riedel   [facility] CONSULT      Patient: Tomasa Reaves Date: 2019     : 1945 Attending: Dusty Telles MD   68year old female      A/P:  Acute hypercarbic respiratory failure  Acute on chronic HFrEF (EF 25-30%) s/p ICD lik Sodium  40 mg Oral BID AC   • atorvastatin  20 mg Oral Nightly       Continuous Infusions:   • DOBUTamine     • sodium chloride 20 mL/hr at 02/01/19 0507   • furosemide (LASIX) infusion 7.5 mg/hr (02/01/19 0815)   • Dextrose-NaCl     • norepinephrine acute airspace disease. HORACIO 7/2018  Study Conclusions  1. Left ventricle: Systolic function was reduced. Wall motion was     normal; there were no regional wall motion abnormalities. 2. Mitral valve: Mild to moderate regurgitation.   3. Left atrium: Emilee Sullivan arteries: Systolic pressure was mildly increased, estimated to     be 39mm Hg.     Impressions:  This study is compared with previous dated 04/06/2018:  Compared to the prior study, there has been no significant interval change

## 2019-02-01 NOTE — CONSULTS
[facility]    PATIENT'S NAME: Kelvin Aiken   ATTENDING PHYSICIAN: Theresa Cotto DO   CONSULTING PHYSICIAN: Cyndie Guerin MD   PATIENT ACCOUNT#:   [id number]    LOCATION:  [address] [place] [address]. RECORD #:   [id number]       DATE OF BIRTH: generalized hypokinesia. Mild to moderate mitral regurgitation was noted. Severe tricuspid regurgitation was noted. No pericardial effusion was noted.      MEDICATIONS:  Allopurinol 100 mg daily, one aspirin 81 mg daily, Lipitor, Diuril 500 mg IV push, N but normal pulmonary vascularity. IMPRESSION:  The patient is a 12-year-old female now with:    1. Acute on chronic renal insufficiency. I suspect the latter is secondary to decreased renal perfusion.   The patient has had occasional episodes of hypot

## 2019-02-01 NOTE — CM/SW NOTE
2/1: Received MDO for POA. Patient down for a procedure, left POA-HC documents at bedside. Per [facility] BENJA BURLESON RN, patient has been lethargic & on Bipap. Patient is not appropriate to fill out documents at this time, SW available to assist when stable.     Addendu

## 2019-02-01 NOTE — PROGRESS NOTES
Core Measure Update: EF 35%  Currently not on ace/arb/arni or spironolactone secondary to concerned for low BP and renal function.

## 2019-02-01 NOTE — PROGRESS NOTES
[facility]    Progress Note    Bernardo Santanafle Patient Status:  Inpatient    1945 MRN [id number]   Location [facility] 2W/SW Attending Sosa Nielson, [address] [facility] Day # 3 PCP John Dowling MD       Subjective:   Bernardo Anaya GM-MG chewable tab 4 tablet 4 tablet Oral Q15 Min PRN   glucose (DEX4) oral liquid 15 g 15 g Oral Q15 Min PRN   Insulin Aspart Pen (NOVOLOG) 100 UNIT/ML flexpen 1-7 Units 1-7 Units Subcutaneous TID CC   Atropine Sulfate 0.1 MG/ML injection 0.5 mg 0.5 mg In 61*  198*  207*   BUN  85*  91*  101*   CREATSERUM  2.24*  2.58*  3.29*   GFRAA  24*  21*  15*   GFRNAA  21*  18*  13*   CA  8.7  8.6  8.8   NA  141  138  137   K  5.2*  5.7*  6.2*   CL  99  96  95   CO2  31  30  26             Assessment and Plan: the clinical documentation in H+P. Based on patients current state of illness, I anticipate that, after discharge, patient will require TBD.

## 2019-02-01 NOTE — PROGRESS NOTES
HPI:    Patient ID: Elvin Cortes is a 68year old female presenting with Patient presents with:  Gallbladder: Patient referred by ALVINA Molina for gallbladder sludge. Abdominal US performed 01/09/2019.  Intermittent pain and pressure to epigastric a watchmand device 2018   • REPAIR RETINAL BREAKS, CRYOTHERAPY - OD - RIGHT EYE     • WATCHMAN N/A 5/25/2018    Performed by Grant Jay MD at [facility] History   Problem Relation Age of Onset   • Stroke Father    • Diabetes Mother    • Stroke Mo comments)             Pt states she got very confused and dizzy when             taking this medication. Other reaction(s):  Other             (See Comments)             Couldn't walk straight  Iodine (Topical)        RASH  Povidone Iodine         ITCHING

## 2019-02-01 NOTE — PROGRESS NOTES
[place] [facility] - [facility]    Progress Note    lEvin Cortes Patient Status:  Inpatient    1945 MRN [id number]   Location [facility] 2W/SW Attending Juan Blackwood, [address] [facility] Day # 3 PCP Vira Chapa MD     Subjective:  BiPap this mor failure (HCC)     RUQ abdominal pain     Acute on chronic congestive heart failure (HCC)     Exertional dyspnea     Orthopnea     Other ascites     Acute on chronic congestive heart failure, unspecified heart failure type ([facility] [address]    1.  Diabetes Mellitus Type

## 2019-02-01 NOTE — PROGRESS NOTES
[facility]     Progress Note        Lynsey Jacinto Patient Status:  Inpatient    1945 MRN [id number]   Location [facility] 2W/SW Attending Sosa Poe, [address] [facility] Day # 3 PCP Marika Hendrickson MD       Subjective:   Patient tab 100 mg 100 mg Oral Daily   aspirin chewable tab 81 mg 81 mg Oral Daily   HYDROcodone-acetaminophen (NORCO) 5-325 MG per tab 1 tablet 1 tablet Oral Q6H PRN   Montelukast Sodium (SINGULAIR) tab 10 mg 10 mg Oral Nightly   Pantoprazole Sodium (PROTONIX) EC obvious source of sepsis at this time.  -Intermittentof hypoglycemia noted. Improvement after dextrose administration. Underlying history of diabetes mellitus. Endocrine consultation pending.  -Intermittent BiPAP as need be.   -DVT prophylaxis: SCDs

## 2019-02-01 NOTE — TELEPHONE ENCOUNTER
----- Message from Hung Andrews RN sent at 2018  8:46 AM CDT -----  Regardin yr CLN recall  Entered into EPIC:Recall colon in 1 year per Dr. Luz Bradley. Last Colon done 2018, next due 2019. Snapshot updated.     Patient had colonoscopy during adm

## 2019-02-01 NOTE — PROCEDURES
Right Heart Catheterization Report     Procedure Date: 2/1/19  Performed Sneha Williamson MD     DESCRIPTION OF PROCEDURE:  After informed consent, patient had interrogation of the right neck with ultrasound. Ultrasound interrogation was used to define th

## 2019-02-01 NOTE — PLAN OF CARE
Inpatient Throughput Communication:    Called inpatient RN [facility] and notified of scheduled procedure Right cardiac cath on 2/1. Verified that appropriate consent is signed: Yes  Appropriate Consent Signed:  Yes  Access Site Hair Clipped and skin prepped:

## 2019-02-02 NOTE — PROGRESS NOTES
Endocrine Note    Reviewed BG levels from the past 24 hours which are significantly improved and currently at goal.  Will continue Levemir 14 units SQ daily and low dose CF. Will follow and adjust as needed.

## 2019-02-02 NOTE — PLAN OF CARE
1700 hourly rounds: pt has been drowsy all day, but is more lethargic/somnolent at this time. MAEx4 to command, not answering any questions except saying \"yeah\". Hx CO2 retention and has been dozing intermittently today - placed on BiPAP.   RT called for

## 2019-02-02 NOTE — PROGRESS NOTES
[facility]     Progress Note        Blair Ro Patient Status:  Inpatient    1945 MRN [id number]   Location [facility] 2W/SW Attending Charley To, [address] [facility] Day # 4 PCP Salud Mcfarlane MD       Subjective:   Patient Oral Q15 Min PRN   Insulin Aspart Pen (NOVOLOG) 100 UNIT/ML flexpen 1-7 Units 1-7 Units Subcutaneous TID CC   Atropine Sulfate 0.1 MG/ML injection 0.5 mg 0.5 mg Intravenous PRN   nitroGLYCERIN (NITROSTAT) SL tab 0.4 mg 0.4 mg Sublingual Q5 Min PRN   Normal Hypertension  8. Hyperlipidemia  9. Mild pulmonary hypertension           Plan   -Patient presents with evidence of increased dyspnea and lower extremity edema.   Clinical presentation consistent with acute on chronic systolic heart failure.  -Underlying

## 2019-02-02 NOTE — PLAN OF CARE
Problem: Patient Centered Care  Goal: Patient preferences are identified and integrated in the patient's plan of care  Interventions:  - What would you like us to know as we care for you? Its very difficult for me to eat healthy.    - Provide timely, comple vasoactive medications to optimize hemodynamic stability  - Monitor arterial and/or venous puncture sites for bleeding and/or hematoma  - Assess quality of pulses, skin color and temperature  - Assess for signs of decreased coronary artery perfusion - ex. 2.93. 3+ edema to BLE. No diuretics. - Renal function improving slightly, adequate UOP today. - Lactulose d/c'd per MD. 1 BM this shift. Belly soft/nontender. Poor appetite. May benefit from nutritionist evaluation and supplements.

## 2019-02-02 NOTE — PROGRESS NOTES
[facility] - [facility]    Cardiology - [facility]  Progress Note    Genesis Buck Patient Status:  Inpatient    1945 MRN [id number]   [facility] 2W/SW Attending Anitha Dumont, [address] [facility] Day # 4 PCP Naren Villegas MD       Ass 2 + edema bilat, warm  Neuro - lethargic, moans, confused, moves extremities, no focal deficits  Psych - agitated    Results:     Lab Results   Component Value Date    WBC 6.8 02/02/2019    HGB 9.7 (L) 02/02/2019    HCT 33.5 (L) 02/02/2019    .0 (L)

## 2019-02-02 NOTE — PROGRESS NOTES
[facility]    Progress Note      Subjective:     Patient appears uncomfortably - moans and confused. Appetite poor.  Didn't sleep well last night      Review of Systems:     Unable to obtain - moans     Objective:   Temp:  [96.8 °F (36 °C) tablet 4 tablet Oral Q15 Min PRN   glucose (DEX4) oral liquid 15 g 15 g Oral Q15 Min PRN   Insulin Aspart Pen (NOVOLOG) 100 UNIT/ML flexpen 1-7 Units 1-7 Units Subcutaneous TID CC   Atropine Sulfate 0.1 MG/ML injection 0.5 mg 0.5 mg Intravenous PRN   nitro 1.4 (H) 0.9 - 1.2 Final     Comment:       Only the INR (not the Protime value) should be utilized for   the monitoring of oral anticoagulant therapy.      Recommended therapeutic ranges for anticoagulant therapy are   as follows:   2.0 - 3.0 All indication

## 2019-02-02 NOTE — PLAN OF CARE
Problem: Diabetes/Glucose Control  Goal: Glucose maintained within prescribed range  INTERVENTIONS:  - Monitor Blood Glucose as ordered  - Assess for signs and symptoms of hyperglycemia and hypoglycemia  - Administer ordered medications to maintain glucose monitoring. On dobutamine gtt.    Goal: Absence of cardiac arrhythmias or at baseline  INTERVENTIONS:  - Continuous cardiac monitoring, monitor vital signs, obtain 12 lead EKG if indicated  - Evaluate effectiveness of antiarrhythmic and heart rate control m leg. Incision on RLQ s/p paracentesis. Dressing clean, dry, intact.      Problem: HEMATOLOGIC - ADULT  Goal: Maintains hematologic stability  INTERVENTIONS  - Assess for signs and symptoms of bleeding or hemorrhage  - Monitor labs and vital signs for trends physical needs  - Identify cognitive and physical deficits and behaviors that affect risk of falls.   - [place] fall precautions as indicated by assessment.  - Educate pt/family on patient safety including physical limitations  - Instruct pt to call for a

## 2019-02-03 NOTE — PROGRESS NOTES
[facility]    Cardiology - AM-S  Progress Note    Janett Arreola Patient Status:  Inpatient    1945 MRN [id number]   [facility] 2W/SW Attending Jose Driver, [address] [facility] Day # 5 PCP Nithya Cisneros MD       Ass S1/S2  Abd - mildly distended, soft  Ext - 2 + edema bilat, warm  Neuro - lethargic, moans, confused, moves extremities, no focal deficits  Psych - agitated    Results:     Lab Results   Component Value Date    WBC 9.6 02/03/2019    HGB 9.3 (L) 02/03/2019

## 2019-02-03 NOTE — PROGRESS NOTES
[place] [facility]    Progress Note      Subjective:     Patient more lethargic today - on BIPAP. Appetite poor.        Review of Systems:     Unable to obtain - moans     Objective:   Temp:  [97.3 °F (36.3 °C)-98.4 °F (36.9 °C)] 97.9 °F (36.6 °C Insulin Aspart Pen (NOVOLOG) 100 UNIT/ML flexpen 1-7 Units 1-7 Units Subcutaneous TID CC   Atropine Sulfate 0.1 MG/ML injection 0.5 mg 0.5 mg Intravenous PRN   nitroGLYCERIN (NITROSTAT) SL tab 0.4 mg 0.4 mg Sublingual Q5 Min PRN   Normal Saline Flush 0.9 Protime value) should be utilized for   the monitoring of oral anticoagulant therapy. Recommended therapeutic ranges for anticoagulant therapy are   as follows:   2.0 - 3.0 All indications except for mechanical prosthetic   cardiac valves. 2.5 - 3.

## 2019-02-03 NOTE — PROGRESS NOTES
[facility]     Progress Note        Maria D Casillas Patient Status:  Inpatient    1945 MRN [id number]   Location [facility] 2W/SW Attending Suzette Staples, [address] [facility] Day # 5 PCP Erin Fowler MD       Subjective:   Patient injection 0.5 mg 0.5 mg Intravenous PRN   nitroGLYCERIN (NITROSTAT) SL tab 0.4 mg 0.4 mg Sublingual Q5 Min PRN   Normal Saline Flush 0.9 % injection 10 mL 10 mL Intravenous PRN   allopurinol (ZYLOPRIM) tab 100 mg 100 mg Oral Daily   aspirin chewable tab 81 Diuretic therapy held for now.  -Dobutamine per cardiology recommendations  -Pro-calcitonin negative. Blood and urine cultures . No obvious source of sepsis at this time.  -Diabetes management per endocrine recs.   Intermittent episodes of hypoglycemia th

## 2019-02-03 NOTE — PROGRESS NOTES
[place] [facility] - [facility]    Progress Note    Tristen Garcia Patient Status:  Inpatient    1945 MRN [id number]   Location [facility] 2W/SW Attending Hu Oneill, 160[address] [facility] Day # 6 PCP Master Motta MD     Subjective:  Lethargic.   Al Exertional dyspnea     Orthopnea     Other ascites     Acute on chronic congestive heart failure, unspecified heart failure type (HCC)     MARTIN (acute kidney injury) (HCC)     CKD (chronic kidney disease), stage III [facility])    Patient is a 68year old female

## 2019-02-04 NOTE — PLAN OF CARE
RESPIRATORY - ADULT    • Achieves optimal ventilation and oxygenation Not Progressing        Remains on bipap 40% most of time, off for short breaks, o2 sats mid 90s but rr up to 30,       CARDIOVASCULAR - ADULT    • Maintains optimal cardiac output and he

## 2019-02-04 NOTE — PROGRESS NOTES
[facility]  Nephrology Daily Progress Note    Brandee Pugh  [id number]  68year old      HPI:   Brandee Pugh is a 68year old female. Remains on BiPap. Awake and will converse,. Follows commands.        ROS:     Constitutional:  Negative appropriate for age reflexes and motor skills appropriate for age    Labs:  Lab Results   Component Value Date    WBC 9.8 02/04/2019    HGB 9.7 02/04/2019    HCT 31.8 02/04/2019    .0 02/04/2019    CREATSERUM 2.22 02/04/2019    BUN 96 02/04/2019 ml infusion, 2.5-40 mcg/kg/min (Dosing Weight), Intravenous, Continuous  •  0.9%  NaCl infusion, , Intravenous, Continuous  •  norepinephrine (LEVOPHED) 4 mg/250 ml premix infusion, 0.5-8 mcg/min, Intravenous, Continuous  •  Normal Saline Flush 0.9 % injec ASSESSMENT/PLAN:   Assessment   Patient Active Problem List:     Coronary atherosclerosis     Primary cardiomyopathy ([facility]     Chronic airway obstruction (HCC)     Type II diabetes mellitus ([facility]     Essential hypertension     Hyperlipidemia     Pseudoph

## 2019-02-04 NOTE — PROGRESS NOTES
[facility]    Cardiology - AMG-S  Progress Note    Jason Karimi Patient Status:  Inpatient    1945 MRN [id number]   [facility] 2W/SW Attending Saji Pat, [address] [facility] Day # 6 PCP Layne More MD       Ass Subcutaneous TID CC   • allopurinol  100 mg Oral Daily   • aspirin  81 mg Oral Daily   • Montelukast Sodium  10 mg Oral Nightly   • Pantoprazole Sodium  40 mg Oral BID AC   • atorvastatin  20 mg Oral Nightly     Continuous Infusions:   • DOBUTamine 2.5 mcg

## 2019-02-04 NOTE — PROGRESS NOTES
Pulmonary/Critical Care Follow Up Note    HPI:   Hugh Tejada is a 68year old female with Patient presents with:  Dyspnea JAZLYN SOB (respiratory)      PCP Maisha Hamm MD  Admission Attending Robel Kinney MD    Hospital Day #6    Confused  On/off bip chewable tab 4 tablet, 4 tablet, Oral, Q15 Min PRN  •  glucose (DEX4) oral liquid 15 g, 15 g, Oral, Q15 Min PRN  •  Insulin Aspart Pen (NOVOLOG) 100 UNIT/ML flexpen 1-7 Units, 1-7 Units, Subcutaneous, TID CC  •  Atropine Sulfate 0.1 MG/ML injection 0.5 mg, 30 02/04/2019     02/04/2019    CA 8.8 02/04/2019    ALB 2.9 02/04/2019    MG 2.5 02/04/2019    PHOS 3.5 02/04/2019       Recent Labs   Lab  02/02/19   0449  02/03/19   0552  02/04/19   0427   GLU  177*  154*  157*   BUN  105*  100*  96*   BRANDEN

## 2019-02-04 NOTE — PROGRESS NOTES
Patient refuses to wear Bipap, refusing to be turned and stated that she is ready to die. Dr. Ramirez Drivers called and notified. No new orders. Will attempt Bipap at a later time.

## 2019-02-05 NOTE — PROGRESS NOTES
[facility]    Progress Note    Mau Landin Patient Status:  Inpatient    1945 MRN [id number]   Location [facility] 2W/SW Attending Christian Jeffrey, [address] Day # 7 PCP Raymundo Morris MD     Subjective:  Feels better t Acute on chronic congestive heart failure (HCC)     Exertional dyspnea     Orthopnea     Other ascites     Acute on chronic congestive heart failure, unspecified heart failure type (HCC)     MARTIN (acute kidney injury) (HCC)     CKD (chronic kidney disease)

## 2019-02-05 NOTE — PROGRESS NOTES
[facility]  Nephrology Daily Progress Note    Janett Arreola  [id number]  68year old      HPI:   Janett Arreola is a 68year old female. Feels weak and aches all over but otherwise much better. MS continues to improve and now off BiPaP.   Drew Joe edema, cyanosis, or clubbing  Neurological: exam appropriate for age reflexes and motor skills appropriate for age    Labs:  Lab Results   Component Value Date    WBC 10.4 02/05/2019    HGB 9.8 02/05/2019    HCT 32.7 02/05/2019    .0 02/05/2019    C pulmonary artery. 3. Lung fields otherwise essentially clear.  No pneumothorax     Dictated by (CST): Sri aFir MD on 2/05/2019 at 7:32     Approved by (CST): Sri Fair MD on 2/05/2019 at 7:39              Medications:    Current [facility]    Allergies:    Bupropion               OTHER (SEE COMMENTS)    Comment:Other reaction(s): Other (see comments)             Pt states she got very confused and dizzy when             taking this medication. Other reaction(s):  Other             (See

## 2019-02-05 NOTE — PLAN OF CARE
Problem: GENITOURINARY - ADULT  Goal: Absence of urinary retention  INTERVENTIONS:  - Assess patient’s ability to void and empty bladder  - Monitor intake/output and perform bladder scan as needed  - Follow urinary retention protocol/standard of care  - Co isolation precautions as appropriate  - Initiate Pressure Ulcer prevention bundle as indicated  Outcome: Progressing      Problem: HEMATOLOGIC - ADULT  Goal: Maintains hematologic stability  INTERVENTIONS  - Assess for signs and symptoms of bleeding or hem decreased cardiac output  - Evaluate effectiveness of vasoactive medications to optimize hemodynamic stability  - Monitor arterial and/or venous puncture sites for bleeding and/or hematoma  - Assess quality of pulses, skin color and temperature  - Assess f

## 2019-02-05 NOTE — PROGRESS NOTES
[facility]    Cardiology - AM-S  Progress Note    Trina Martin Patient Status:  Inpatient    1945 MRN [id number]   [facility] 2W/SW Attending Fernando Harper, [address] [facility] Day # 7 PCP Conor Palencia MD       Ass sodium chloride       • hydrALAzine HCl  5 mg Oral Q8H [address]   • Insulin Aspart Pen  1-11 Units Subcutaneous TID CC   • insulin detemir  14 Units Subcutaneous Before dinner   • allopurinol  100 mg Oral Daily   • aspirin  81 mg Oral Daily   • Montelukast Sodi

## 2019-02-05 NOTE — PROGRESS NOTES
[facility]    Progress Note      Assessment and Plan:   1. Acute on chronic respiratory failure–the patient had CHF superimposed on obesity with probable JAI and obesity. The patient is slowly improving. She did have diuresis yesterday. 104 02/05/2019    CO2 31 02/05/2019     02/05/2019    CA 9.0 02/05/2019    ALB 3.0 02/05/2019    MG 2.6 02/05/2019    PHOS 3.2 02/05/2019     Chest x-ray–mild vascular congestion    Abbey Kraus MD  Medical Director, Critical Care, [facility]

## 2019-02-06 NOTE — WOUND PROGRESS NOTE
Pt seen for wound care follow up. Pt is sitting up in the chair feet elevated. The left lower medial leg wound was assessed. The open area has become smaller, red/pink with a moist center. The caro wound is intact and red.  Wound cleansed and dressed with x

## 2019-02-06 NOTE — PROGRESS NOTES
[facility]    Progress Note      Assessment and Plan:   1. Acute on chronic respiratory failure–the patient had CHF superimposed on obesity with probable JAI and obesity. The patient is slowly improving. She did have diuresis yesterday. 02/06/2019    HGB 9.7 02/06/2019    HCT 32.7 02/06/2019    .0 02/06/2019    CREATSERUM 1.61 02/06/2019    BUN 79 02/06/2019     02/06/2019    K 4.3 02/06/2019     02/06/2019    CO2 31 02/06/2019     02/06/2019    CA 8.9 02/06/2019

## 2019-02-06 NOTE — PROGRESS NOTES
[place] [facility] - [facility]    Progress Note    Leno Li Patient Status:  Inpatient    1945 MRN [id number]   Location [facility] 2W/SW Attending Leonardo Alexis, [address] [facility] Day # 8 PCP Prieto Fritz MD     Subjective:  Feels better t abdominal pain     Acute on chronic congestive heart failure (HCC)     Exertional dyspnea     Orthopnea     Other ascites     Acute on chronic congestive heart failure, unspecified heart failure type (HCC)     MARTIN (acute kidney injury) ([facility] [address]     CKD (chron

## 2019-02-06 NOTE — PLAN OF CARE
Problem: Patient Centered Care  Goal: Patient preferences are identified and integrated in the patient's plan of care  Interventions:  - What would you like us to know as we care for you? Its very difficult for me to eat healthy.    - Provide timely, comple empty bladder  - Monitor intake/output and perform bladder scan as needed  - Follow urinary retention protocol/standard of care  - Consider collaborating with pharmacy to review patient's medication profile  - Implement strategies to promote bladder emptyi appropriate  - Initiate Pressure Ulcer prevention bundle as indicated  Outcome: Progressing  Dressing changed to wound on left leg    Problem: HEMATOLOGIC - ADULT  Goal: Maintains hematologic stability  INTERVENTIONS  - Assess for signs and symptoms of ble vital signs, rhythm, and trends  - Monitor for bleeding, hypotension and signs of decreased cardiac output  - Evaluate effectiveness of vasoactive medications to optimize hemodynamic stability  - Monitor arterial and/or venous puncture sites for bleeding a

## 2019-02-06 NOTE — PROGRESS NOTES
02/06/19 0437   Clinical Encounter Type   Visited With Patient   Routine Visit Introduction   Continue Visiting Yes   Crisis Visit Critical care   Patient's Supportive Strategies/Resources daughter   Referral From Nurse   Amish Encounters   Religiou

## 2019-02-06 NOTE — PLAN OF CARE
Pt refusing to wear bipap during the night; wants water and ice chips even though she was told many times that she was on a fluid restriction; educated on why she was in the hospital and why she was in the ICU brought down on bipap; pt is very hostile and

## 2019-02-06 NOTE — PROGRESS NOTES
[place] [facility]  Nephrology Daily Progress Note    Janett Arreola  [id number]  68year old      HPI:   Janett Arreola is a 68year old female. Up in chair. Does not like fluid restriction. Still on dobutamine.  Had paracentesis again with 3 L rem Neurological: exam appropriate for age reflexes and motor skills appropriate for age    Labs:  Lab Results   Component Value Date    WBC 10.4 02/06/2019    HGB 9.7 02/06/2019    HCT 32.7 02/06/2019    .0 02/06/2019    CREATSERUM 1.61 02/06/2019    B Sophy Cervantes MD on 2/06/2019 at 7:12     Approved by (CST): Sophy Cervantes MD on 2/06/2019 at 7:16          Xr Chest Ap Portable  (cpt=71045)    Result Date: 2/5/2019  CONCLUSION:  1. Postop cardiac surgery changes.  Appearance of the chest consiste (NITROSTAT) SL tab 0.4 mg, 0.4 mg, Sublingual, Q5 Min PRN  •  Normal Saline Flush 0.9 % injection 10 mL, 10 mL, Intravenous, PRN  •  allopurinol (ZYLOPRIM) tab 100 mg, 100 mg, Oral, Daily  •  aspirin chewable tab 81 mg, 81 mg, Oral, Daily  •  Montelukast S congestive heart failure (HCC)     Exertional dyspnea     Orthopnea     Other ascites     Acute on chronic congestive heart failure, unspecified heart failure type (HCC)     MARTIN (acute kidney injury) ([facility] [address]     CKD (chronic kidney disease), stage III ([facility] [address]

## 2019-02-06 NOTE — PHYSICAL THERAPY NOTE
PHYSICAL THERAPY EVALUATION - INPATIENT     Room Number: 123/620-S  Evaluation Date: 2/6/2019  Type of Evaluation: Initial   Physician Order: PT Eval and Treat    Presenting Problem: exertional dyspnea, s/p cath  Reason for Therapy: Mobility Dysfunction a dyspnea  Active Problems:    Acute on chronic congestive heart failure (HCC)    Orthopnea    Other ascites    Acute on chronic congestive heart failure, unspecified heart failure type (HCC)    MARTIN (acute kidney injury) (HCC)    CKD (chronic kidney disease) CRYOTHERAPY - OD - RIGHT EYE     • WATCHMAN N/A 5/25/2018    Performed by Anthony Trujillo MD at [address]  Type of Home: House   Home Layout: One level                Lives With: Friend(s)     Patient Owned Equipment: Rolling walker;Cane Gait Assistance: Not tested  Distance (ft): 0  Assistive Device: None     Stoop/Curb Assistance: Not tested       Bed Mobility: not tested     Transfers: mod A X 2 with rolling walker     Exercise/Education Provided:   Body mechanics  Transfer training

## 2019-02-06 NOTE — PROGRESS NOTES
[facility]OSP - [facility]    Cardiology - AMG-S  Progress Note    Edenilson Downs Patient Status:  Inpatient    1945 MRN [id number]   [facility] 2W/SW Attending Delfina Marrufo, [address] [facility] Day # 8 PCP Darcy Reese MD       Ass normal  Lungs - decreased excursion, otherwise clear bilaterally  Heart - irreg irreg, nl S1/S2  Abd - mildly distended, soft  Ext - 2 + edema bilat, warm  Neuro - alert, oriented, no focal deficits  Psych - calm    Results:     Lab Results   Component Litzy

## 2019-02-07 NOTE — PLAN OF CARE
CARDIOVASCULAR - ADULT    • Maintains optimal cardiac output and hemodynamic stability Progressing    • Absence of cardiac arrhythmias or at baseline Progressing    Runs of frequent PVCs noted this morning, patient's chronic underlying rhythm noted to be a ADULT    • Incision(s), wounds(s) or drain site(s) healing without S/S of infection Progressing    Wound retraced by wound care services; dressings changed and remain CDI.

## 2019-02-07 NOTE — OCCUPATIONAL THERAPY NOTE
OCCUPATIONAL THERAPY EVALUATION - INPATIENT     Room Number: 073/602-E  Evaluation Date: 2/7/2019  Type of Evaluation: Initial  Presenting Problem: (exertional dyspnea, s/p cath)    Physician Order: IP Consult to Occupational Therapy  Reason for Therapy: A activities; Energy conservation/work simplification techniques;ADL training;Functional transfer training; Endurance training;Patient/Family education;Patient/Family training;Equipment eval/education       OCCUPATIONAL THERAPY MEDICAL/SOCIAL HISTORY     Probl ESOPHAGOGASTRODUODENOSCOPY (EGD) N/A 4/4/2018    Performed by Zenaida Mccormack MD at [address] [place] [address] ENDOSCOPY   • ESOPHAGOGASTRODUODENOSCOPY (EGD) N/A 4/3/2018    Performed by Zenaida Mccormack MD at [address] [place] [address] ENDOSCOPY   • OTHER SURGICAL HISTORY 59.67%  Standardized Score (AM-PAC Scale): 33.39  CMS Modifier (G-Code): CK    FUNCTIONAL TRANSFER ASSESSMENT  Supine to Sit : Dependent assistance  Sit to Stand:  Moderate assistance(x2)  Toilet Transfer: dep  Shower Transfer: dep  Chair Transfer: dep    B

## 2019-02-07 NOTE — PHYSICAL THERAPY NOTE
PHYSICAL THERAPY TREATMENT NOTE - INPATIENT     Room Number: 245/713-Y       Presenting Problem: exertional dyspnea, s/p cath    Problem List  Principal Problem:    Exertional dyspnea  Active Problems:    Acute on chronic congestive heart failure ([facility] [address] RESTRICTION  Weight Bearing Restriction: None                PAIN ASSESSMENT   Rating: Unable to rate  Location: right lower extremity  Management Techniques: Activity promotion; Body mechanics;Repositioning    BALANCE Stand at assistance level: minimum assistance with walker - rolling     Goal #2  Current Status Max assist x 2   Goal #3 Patient is able to ambulate 10 feet with assist device: walker - rolling at assistance level: minimum assistance   Goal #3   Current St

## 2019-02-07 NOTE — OCCUPATIONAL THERAPY NOTE
OCCUPATIONAL THERAPY TREATMENT NOTE - INPATIENT        Room Number: 993/361-K       Presenting Problem: (exertional dyspnea, s/p cath)    Problem List  Principal Problem:    Exertional dyspnea  Active Problems:    Acute on chronic congestive heart failure eval/education    SUBJECTIVE  \"I can't move my legs\"     OBJECTIVE  Precautions: Drain(s)(swan jose)    WEIGHT BEARING RESTRICTION  Weight Bearing Restriction: None           PAIN ASSESSMENT     Location: (low back pain)        ACTIVITY TOLERANCE   HR: 1 Goals  on:   Frequency: 3-5x a week    9 [facility] MOT/R  [address] [facility]  #[id number]

## 2019-02-07 NOTE — PLAN OF CARE
Problem: GENITOURINARY - ADULT  Goal: Absence of urinary retention  INTERVENTIONS:  - Assess patient’s ability to void and empty bladder  - Monitor intake/output and perform bladder scan as needed  - Follow urinary retention protocol/standard of care  - Co precautions as appropriate  - Initiate Pressure Ulcer prevention bundle as indicated  Complete bed bath given this am. No pressure ulcer noted. Sacral mepilex maintained.      Problem: HEMATOLOGIC - ADULT  Goal: Maintains hematologic stability  INTERVENTION stability  INTERVENTIONS:  - Monitor vital signs, rhythm, and trends  - Monitor for bleeding, hypotension and signs of decreased cardiac output  - Evaluate effectiveness of vasoactive medications to optimize hemodynamic stability  - Monitor arterial and/or

## 2019-02-07 NOTE — PROGRESS NOTES
[facility]    Progress Note      Assessment and Plan:   1. Acute on chronic respiratory failure–the patient had CHF superimposed on obesity with probable JAI and obesity. The patient is slowly improving.  She remains on dobutamine at taper extremity edema. More edema on the right than the left. Neurologic grossly intact with symmetric tone and strength and reflex.   Skin without gross abnormality     Results:     Lab Results   Component Value Date    WBC 7.8 02/07/2019    HGB 8.6 02/07/2019

## 2019-02-07 NOTE — PROGRESS NOTES
[facility]    Cardiology - AM-S  Progress Note    Trina Martin Patient Status:  Inpatient    1945 MRN [id number]   [facility] 2W/SW Attending Fernando Harper, [address] [facility] Day # 9 PCP Conor Palencia MD       Ass pressure rising off of diuretic support  Creat and BUN downtrending      Plan:   1. Resume diuretic support as above  2. Increase hydral as SVR elevated. 3. Check UA given cloudy serna bag  4. Resume digoxin for rate control   5.  May consider changing to 02/06/2019    TP 6.2 02/06/2019    AST 15 02/06/2019    ALT 9 (L) 02/06/2019    PTT 32.2 04/03/2018    INR 1.4 (H) 02/01/2019    T4F 1.25 01/30/2019    TSH 6.42 (H) 01/30/2019    LIP 36 12/11/2018     (H) 06/27/2016    MG 2.3 02/07/2019    PHOS 3.4

## 2019-02-07 NOTE — CARDIAC REHAB
CARDIAC REHAB HEART FAILURE EDUCATION    Handouts provided and reviewed: CHF Booklet. Activity: Chair for all meals: Reviewed       Ambulation:        Tolerated Activity:          Disease Process: Disease process reviewed.     Reviewed the following: ZOHREH

## 2019-02-07 NOTE — PROGRESS NOTES
[place] [facility] - [facility]    Progress Note    Tiffanie Obregon Patient Status:  Inpatient    1945 MRN [id number]   Location [facility] 2W/SW Attending Kiersten Vasquez, 160[address] [facility] Day # 9 PCP Fito Wolff MD     Subjective:  Feels better t abdominal pain     Acute on chronic congestive heart failure (HCC)     Exertional dyspnea     Orthopnea     Other ascites     Acute on chronic congestive heart failure, unspecified heart failure type (HCC)     MARTIN (acute kidney injury) ([facility] [address]     CKD (chron

## 2019-02-08 NOTE — PROGRESS NOTES
Priscilla Hannon catheter removed without complications. Right fatty lump noted prior to removal, which was already noted by MD's. Patient being monitored by bedside RN.

## 2019-02-08 NOTE — PLAN OF CARE
Problem: Patient Centered Care  Goal: Patient preferences are identified and integrated in the patient's plan of care  Interventions:  - What would you like us to know as we care for you? Its very difficult for me to eat healthy.    - Provide timely, comple Assess patient’s ability to void and empty bladder  - Monitor intake/output and perform bladder scan as needed  - Follow urinary retention protocol/standard of care  - Consider collaborating with pharmacy to review patient's medication profile  - Implement Progressing      Problem: HEMATOLOGIC - ADULT  Goal: Maintains hematologic stability  INTERVENTIONS  - Assess for signs and symptoms of bleeding or hemorrhage  - Monitor labs and vital signs for trends  - Administer supportive blood products/factors, fluid quality of pulses, skin color and temperature  - Assess for signs of decreased coronary artery perfusion - ex. Angina  - Evaluate fluid balance, assess for edema, trend weights  Outcome: Progressing  Manual CO checks every 4 hours; venous blood gas Q12H.

## 2019-02-08 NOTE — PLAN OF CARE
CARDIOVASCULAR - ADULT    • Absence of cardiac arrhythmias or at baseline Not Progressing        Diabetes/Glucose Control    • Glucose maintained within prescribed range Not Progressing        Patient/Family Goals    • Patient/Family Long Term Goal Not Pro

## 2019-02-08 NOTE — OCCUPATIONAL THERAPY NOTE
OCCUPATIONAL THERAPY TREATMENT NOTE - INPATIENT    Room Number: 221/690-R  Presenting Problem: (exertional dyspnea, s/p cath)     Problem List  Principal Problem:    Exertional dyspnea  Active Problems:    Acute on chronic congestive heart failure ([facility] [address] benefit from [place]. She was left in bed with call light in reach and all needs met. DISCHARGE RECOMMENDATIONS  OT Discharge Recommendations: Sub-acute rehabilitation       PLAN  OT Treatment Plan: Balance activities; Energy conservation/work simplificatio Session: In bed;Needs met;Call light within reach;RN aware of session/findings; All patient questions and concerns addressed; Family present    OT Goals:            Patient will complete functional transfer with mod a   Comment: mod a x2 for sit <> stand

## 2019-02-08 NOTE — PROGRESS NOTES
Norm Marie 23 SERVICE PROGRESS NOTE      Patient: Lisha Carr Date: 2019     : 1945 Attending: Javier Cervantes MD   68year old female      A/P:  Acute hypercarbic respiratory failure  Acute on chronic HFrEF (EF 25-30%) s/p I 5 mg Oral TID (Nitrates)   • digoxin  125 mcg Oral Daily   • furosemide  40 mg Intravenous Daily   • Insulin Aspart Pen  1-5 Units Subcutaneous TID CC   • allopurinol  100 mg Oral Daily   • aspirin  81 mg Oral Daily   • Montelukast Sodium  10 mg Oral Night 02/01/2019    PTT 32.2 04/03/2018    AST 15 02/06/2019    TP 6.2 02/06/2019    TSH 6.42 (H) 01/30/2019     CXR 1/29/19     =====  CONCLUSION:   1. Mild-to-moderate cardiomegaly and normal bony vascularity. Eventration right hemidiaphragm.  No acute airspace moderately to markedly dilated. Pacer wire     noted in right atrium. 7. Atrial septum: There was a residual atrial shunt post-transseptal     catheterization. 8. Tricuspid valve: There was moderate-severe regurgitation.   9. Pulmonary arteries: Systolic

## 2019-02-08 NOTE — PROGRESS NOTES
[facility]    Progress Note    Lynsey Jacinto Patient Status:  Inpatient    1945 MRN [id number]   [facility] 2W/SW Attending Paola Brock,    Hosp Day # 9 PCP Marika Hendrickson MD       Subjective:   Meet White all extremities good strength  no deformities  Extremities: 2+ EDEMA  Neurological:  Grossly normal    Results:     Laboratory Data:  Lab Results   Component Value Date    WBC 7.8 02/07/2019    HGB 8.6 (L) 02/07/2019    HCT 29.3 (L) 02/07/2019    . 0 on 2/06/2019 at 7:16              ASSESSMENT/PLAN:   Assessment       1  ? DVT  NEG DOPPLER  2. MARTIN   RENAL FX STABLE  3.  CHF  HAS CARDIOMYOPATHY   CVP READING 18   GOT LASIX 40MB IVP X 2 TODAY  ON DOBUTAMINE   40MG ORDERED FOR AM  FURTHER DECISION TO FOL

## 2019-02-08 NOTE — PROGRESS NOTES
[facility] - [facility]    Progress Note    Elvin Cortes Patient Status:  Inpatient    1945 MRN [id number]   [facility] 2W/SW Attending Jeet Manjarrez,    Hosp Day # 10 PCP Vira Chapa MD        Subjective:     Yasmany Jacques .0 02/08/2019    CREATSERUM 1.48 02/08/2019    BUN 72 (H) 02/08/2019     02/08/2019    K 4.5 02/08/2019     02/08/2019    CO2 33 (H) 02/08/2019     (H) 02/08/2019    CA 8.6 02/08/2019    ALB 2.7 (L) 02/08/2019    ALKPHO 47 02/06/2

## 2019-02-08 NOTE — PROGRESS NOTES
[facility]    Progress Note    Kevan Boothe Patient Status:  Inpatient    1945 MRN [id number]   Location [facility] 2W/SW Attending Michelle Parada, [address] Day # 10 PCP Nikki Caicedo MD     Subjective:  Feels pedro abdominal pain     Acute on chronic congestive heart failure (HCC)     Exertional dyspnea     Orthopnea     Other ascites     Acute on chronic congestive heart failure, unspecified heart failure type (HCC)     MARTIN (acute kidney injury) ([facility]     CKD (chron

## 2019-02-09 NOTE — PLAN OF CARE
Problem: Diabetes/Glucose Control  Goal: Glucose maintained within prescribed range  INTERVENTIONS:  - Monitor Blood Glucose as ordered  - Assess for signs and symptoms of hyperglycemia and hypoglycemia  - Administer ordered medications to maintain glucose Progressing  Crea slightly back up this am. Cont to make adequate hourly urine output.      Problem: SKIN/TISSUE INTEGRITY - ADULT  Goal: Incision(s), wounds(s) or drain site(s) healing without S/S of infection  INTERVENTIONS:  - Assess and document risk fa difficulty  - Respiratory Therapy support as indicated  - Manage/alleviate anxiety  - Monitor for signs/symptoms of CO2 retention  Outcome: Not Progressing  Remains on 2L O2. Refuses Bipap at night.      Problem: CARDIOVASCULAR - ADULT  Goal: Maintains opti reports new pain  - Anticipate increased pain with activity and pre-medicate as appropriate  Outcome: Not Progressing  Cont to c/o R leg pain. Min relief from Tylenol po.

## 2019-02-09 NOTE — PROGRESS NOTES
[facility]    Progress Note    Elvin Cortes Patient Status:  Inpatient    1945 MRN [id number]   Location [facility] 2W/SW Attending Rishabh Rosas MD   [facility] Day # 6 PCP Vira Chapa MD       SUBJECTIVE:    Feeling we cardiac surgery changes. 2. Mild central vascular congestion. Findings consistent with chronic, mostly compensated CHF. 3. [place]-Fco catheter with the tip in the main pulmonary artery.  4. No pneumothorax     Dictated by (CST): Sri Fair MD on 2/08/2 GM-MG chewable tab 4 tablet 4 tablet Oral Q15 Min PRN   glucose (DEX4) oral liquid 15 g 15 g Oral Q15 Min PRN   Atropine Sulfate 0.1 MG/ML injection 0.5 mg 0.5 mg Intravenous PRN   nitroGLYCERIN (NITROSTAT) SL tab 0.4 mg 0.4 mg Sublingual Q5 Min PRN   Norm daily weights and I/O    MARTIN on CKD stage 3  - seen by nephrology  - monitor renal function closely with diuresis    Afib  - rates controlled  - s/p watchman device    CAD  - s/p CABG  - cont med management    DM type 2  - seen by endo    Ascites   - likel

## 2019-02-09 NOTE — PROGRESS NOTES
[facility]    Progress Note    Trina Martin Patient Status:  Inpatient    1945 MRN [id number]   Location [facility] 2W/SW Attending Andrew Wang MD   [address] Day # 11 PCP Conor Palencia MD        Subjective:     Constitu 02/09/2019    .0 02/09/2019    CREATSERUM 1.54 (H) 02/09/2019    BUN 68 (H) 02/09/2019     02/09/2019    K 4.4 02/09/2019    CL 97 02/09/2019    CO2 30 02/09/2019     (H) 02/09/2019    CA 8.7 02/09/2019    ALB 2.7 (L) 02/08/2019    ALKP

## 2019-02-09 NOTE — PROGRESS NOTES
Norm Marie 23 SERVICE PROGRESS NOTE      Patient: Lisha Carr Date: 2019     : 1945 Attending: Javier Cervantes MD   68year old female      A/P:  Acute hypercarbic respiratory failure  Acute on chronic HFrEF (EF 25-30%) s/p I Sodium  40 mg Oral BID AC   • atorvastatin  20 mg Oral Nightly       Continuous Infusions:   • milrinone lactate in Dextrose 0.25 mcg/kg/min (02/09/19 0600)   • sodium chloride Stopped (02/02/19 0715)         Vital Last Value 24 Hour Range   Temperature 98 Systolic function was reduced. Wall motion was     normal; there were no regional wall motion abnormalities. 2. Mitral valve: Mild to moderate regurgitation.   3. Left atrium: Left atrial appendage closure device present with     defect and flow around the Hg.    Impressions:  This study is compared with previous dated 04/06/2018:  Compared to the prior study, there has been no significant interval change

## 2019-02-09 NOTE — PROGRESS NOTES
[facility] - [facility]  Nephrology Daily Progress Note    Sheng Flores  [id number]  68year old      HPI:   Sheng Flores is a 68year old female. Awake and alert. Comfortable supine. Still very weak and achy. Eating fair.        ROS:     Jerod appropriate for age    Labs:  Lab Results   Component Value Date    WBC 8.7 02/09/2019    HGB 9.4 02/09/2019    HCT 31.7 02/09/2019    .0 02/09/2019    CREATSERUM 1.54 02/09/2019    BUN 68 02/09/2019     02/09/2019    K 4.4 02/09/2019    CL 97 distention 2. Mild perihilar bibasilar interstitial prominence unchanged. 3. Transvenous ICD leads unchanged in position. Right internal jugular [place]-Fco catheter with tip in right pulmonary. No pneumothorax.      Dictated by (CST): Evangelina Reynolds MD mg, 100 mg, Oral, Daily  •  aspirin chewable tab 81 mg, 81 mg, Oral, Daily  •  Montelukast Sodium (SINGULAIR) tab 10 mg, 10 mg, Oral, Nightly  •  Pantoprazole Sodium (PROTONIX) EC tab 40 mg, 40 mg, Oral, BID AC  •  PEG 3350 (MIRALAX) powder packet 17 g, 17 MARTIN (acute kidney injury) ([facility] [address]     CKD (chronic kidney disease), stage III ([address]      Attempting to diurese pt. UO good and weight down. Watch renal function. To transfer to floor. Discussed with RN and daughter.               2/9/2019  Simona oJrge,

## 2019-02-09 NOTE — PROGRESS NOTES
[facility]    Progress Note    Blair Ro Patient Status:  Inpatient    1945 MRN [id number]   Location [facility] 2W/SW Attending Charley To, [address] [facility] Day # 6 PCP Salud Mcfarlane MD     Subjective:  Feels okay congestive heart failure (HCC)     Exertional dyspnea     Orthopnea     Other ascites     Acute on chronic congestive heart failure, unspecified heart failure type (HCC)     MARTIN (acute kidney injury) ([facility] [facility]     CKD (chronic kidney disease), stage III ([facility] [address] [facility]

## 2019-02-09 NOTE — PROGRESS NOTES
[facility]    Progress Note    [facility] Patient Status:  Inpatient    1945 MRN [id number]   [facility] 2W/SW Attending Rosalva Guerra, [address] Day # 10 PCP Suly Sanches MD       Subjective:   Miryam Rao strength  no deformities  Extremities:1+ edema  Neurological:  Grossly normal    Results:     Laboratory Data:  Lab Results   Component Value Date    WBC 8.1 02/08/2019    HGB 9.2 (L) 02/08/2019    HCT 30.1 (L) 02/08/2019    .0 02/08/2019    CREATSE 2/07/2019 at 10:11     Approved by (CST): Cr Montoya MD on 2/07/2019 at 10:19              ASSESSMENT/PLAN:   Assessment       1  chf   Appreciate help of dr Consuelo Craft   on lasix 31OH ivp bid   xray still w chf  And on exam   weight up today   good u

## 2019-02-09 NOTE — PHYSICAL THERAPY NOTE
PHYSICAL THERAPY TREATMENT NOTE - INPATIENT     Room Number: 013/147-M       Presenting Problem: exertional dyspnea, s/p cath    Problem List  Principal Problem:    Exertional dyspnea  Active Problems:    Acute on chronic congestive heart failure ([facility] [address] patients with this level of impairment may benefit from LTAC, however do anticipate significant improvement in a few days and feels she will be more appropriate for [facility].     DISCHARGE RECOMMENDATIONS  PT Discharge Recommendations: Sub-acute rehabilitation Score (AM-PAC Scale): 30.55   CMS Modifier (G-Code): CM    FUNCTIONAL ABILITY STATUS  Gait Assessment   Gait Assistance: Not tested  Distance (ft):  0(Patient able to stand at [facility])  Assistive Device: Rolling walker     Stoop/Curb Assistance: Not tested  [employer]

## 2019-02-09 NOTE — OCCUPATIONAL THERAPY NOTE
Attempted to work with patient this morning; RN discussing patient had just been returned back to bed, not tolerating being up in chair for long periods; will follow up later if schedule permits.     Payal Ryan, OTR/L   [address] [place] [address]

## 2019-02-10 NOTE — PROGRESS NOTES
[facility]    Progress Note    Bernardo Anaya Patient Status:  Inpatient    1945 MRN [id number]   Location [facility] 2W/SW Attending Malvin Shane MD   [address] Day # 15 PCP John Dowling MD       SUBJECTIVE:    Feeling ok at 10:05          Xr Chest Ap Portable  (cpt=71045)    Result Date: 2/8/2019  CONCLUSION:  1. Cardiomegaly. Postop cardiac surgery changes. 2. Mild central vascular congestion. Findings consistent with chronic, mostly compensated CHF.  3. [place]-Fco catheter nitroGLYCERIN (NITROSTAT) SL tab 0.4 mg 0.4 mg Sublingual Q5 Min PRN   Normal Saline Flush 0.9 % injection 10 mL 10 mL Intravenous PRN   allopurinol (ZYLOPRIM) tab 100 mg 100 mg Oral Daily   aspirin chewable tab 81 mg 81 mg Oral Daily   Montelukast Sodiu watchman device    CAD  - s/p CABG  - cont med management    DM type 2  - seen by endo    Ascites   - likely related to CHF  - s/p paracentesis      Dispo: pending - may need rehab on d/c    Greater than 35 minutes spent, >50% spent counseling re: treatmen

## 2019-02-10 NOTE — RESPIRATORY THERAPY NOTE
Patient refused bipap therapy. [facility] has educated the patient on the purpose of and need for this therapy as well as potential negative outcomes associated with deferring treatment.  Despite education, patient maintains refusal.

## 2019-02-10 NOTE — PROGRESS NOTES
[facility]  Nephrology Daily Progress Note    Jason Karimi  [id number]  68year old      HPI:   Jason Karimi is a 68year old female. Feels OK. Just weak.        ROS:     Constitutional:  Negative for decreased activity, fever, irritabil Value Date    WBC 10.5 02/10/2019    HGB 8.9 02/10/2019    HCT 29.5 02/10/2019    .0 02/10/2019    CREATSERUM 1.57 02/10/2019    BUN 65 02/10/2019     02/10/2019    K 4.1 02/10/2019    CL 94 02/10/2019    CO2 28 02/10/2019     02/10/201 Nebulization, BID  •  furosemide (LASIX) injection 40 mg, 40 mg, Intravenous, BID (Diuretic)  •  milrinone (PRIMACOR) 20mg in D5W 100 mL premix infusion, 0.25 mcg/kg/min, Intravenous, Continuous  •  Metoprolol Succinate ER (Toprol XL) 24 hr tab 25 mg, 25 m states she got very confused and dizzy when             taking this medication. Other reaction(s):  Other             (See Comments)             Couldn't walk straight  Iodine (Topical)        RASH  Povidone Iodine         ITCHING    Comment:Rash/Itching fol

## 2019-02-10 NOTE — PROGRESS NOTES
.                                      [facility] CARDIOLOGY NOTE      Patient: Alphonse Delgadillo Date: 2019     : 1945 Attending: Naresh Hernandez MD   68year old female        A/P:  Acute hypercarbic respiratory failure  Acute on chronic HFrEF alert  HEENT: MMM  Neck: +JVD  Heart: RRR, S1,S2  Lungs: CTAB  Abd: soft, distended abdomen  Ext: 3+ edema BL in LE to knees, ACE wraps on  Psych: normal affect  Neuro: no focal deficits; a,ox3  Skin: warm, dry    Laboratory Results:  Lab Results   Compone

## 2019-02-10 NOTE — OCCUPATIONAL THERAPY NOTE
OCCUPATIONAL THERAPY TREATMENT NOTE - INPATIENT        Room Number: 325/325-A           Presenting Problem: (exertional dyspnea, s/p cath)    Problem List  Principal Problem:    Exertional dyspnea  Active Problems:    Acute on chronic congestive heart fail Bearing Restriction: None        PAIN ASSESSMENT     Location: (low back pain)    O2 SATURATIONS   2L               ACTIVITIES OF DAILY LIVING ASSESSMENT  AM-PAC ‘6-Clicks’ Inpatient Daily Activity Short Form  How much help from another person does the pat

## 2019-02-10 NOTE — PLAN OF CARE
Double RN skin check done prior to transfer off Unit.  Skin check performed by this RN and steph artis    Wounds are as follows:buttocks red but blanchable, mepilex intact, generalized brusing on both arms, partial thickness wound on left ankle and bliste

## 2019-02-10 NOTE — PROGRESS NOTES
[facility]    Progress Note    Keketova Adamss Patient Status:  Inpatient    1945 MRN [id number]   Location [facility] 3W/SW Attending Yazan Cervantes MD   Hosp Day # 12 PCP Darcy Reese MD        Subjective:     Constitu 02/10/2019    .0 02/10/2019    CREATSERUM 1.57 (H) 02/10/2019    BUN 65 (H) 02/10/2019     (L) 02/10/2019    K 4.1 02/10/2019    CL 94 (L) 02/10/2019    CO2 28 02/10/2019     (H) 02/10/2019    CA 8.3 (L) 02/10/2019    ALB 2.8 (L) 02/10/

## 2019-02-11 NOTE — PLAN OF CARE
Problem: Patient Centered Care  Goal: Patient preferences are identified and integrated in the patient's plan of care  Interventions:  - What would you like us to know as we care for you? Its very difficult for me to eat healthy.    - Provide timely, comple collaborating with pharmacy to review patient's medication profile  - Implement strategies to promote bladder emptying  Outcome: Progressing      Problem: METABOLIC/FLUID AND ELECTROLYTES - ADULT  Goal: Electrolytes maintained within normal limits  INTERVE fluids and medications as ordered and appropriate  - Administer supportive blood products/factors as ordered and appropriate  Outcome: Progressing    Goal: Free from bleeding injury  (Example usage: patient with low platelets)  INTERVENTIONS:  - Avoid intr Continuous cardiac monitoring, monitor vital signs, obtain 12 lead EKG if indicated  - Evaluate effectiveness of antiarrhythmic and heart rate control medications as ordered  - Initiate emergency measures for life threatening arrhythmias  - Monitor electro

## 2019-02-11 NOTE — PROGRESS NOTES
[facility]    Progress Note    Lulu Cohen Patient Status:  Inpatient    1945 MRN [id number]   Location [facility] 2W/SW Attending Anabel Brewster MD   [address] Day # 15 PCP Telma Thurman MD       SUBJECTIVE:    Still feel (DUONEB) nebulizer solution 3 mL 3 mL Nebulization BID   furosemide (LASIX) injection 40 mg 40 mg Intravenous BID (Diuretic)   milrinone (PRIMACOR) 20mg in D5W 100 mL premix infusion 0.25 mcg/kg/min Intravenous Continuous   Metoprolol Succinate ER (Toprol bilateral     Rib pain on right side     Cardiac defibrillator in place     BMI 39.0-39.9,adult     Severe obesity (BMI 35.0-39. 9) with comorbidity ([facility]     Right foot pain     Acute gout due to renal impairment involving left ankle     Primary osteoarthr

## 2019-02-11 NOTE — OCCUPATIONAL THERAPY NOTE
OCCUPATIONAL THERAPY TREATMENT NOTE - INPATIENT        Room Number: 325/325-A           Presenting Problem: (exertional dyspnea, s/p cath)    Problem List  Principal Problem:    Exertional dyspnea  Active Problems:    Acute on chronic congestive heart fail ACTIVITIES OF DAILY LIVING ASSESSMENT  AM-PAC ‘6-Clicks’ Inpatient Daily Activity Short Form  How much help from another person does the patient currently need…  -   Putting on and taking off regular lower body clothing?: A Lot  -   Bathing (i

## 2019-02-11 NOTE — PHYSICAL THERAPY NOTE
PHYSICAL THERAPY TREATMENT NOTE - INPATIENT     Room Number: 325/325-A       Presenting Problem: exertional dyspnea, s/p cath    Problem List  Principal Problem:    Exertional dyspnea  Active Problems:    Acute on chronic congestive heart failure ([facility] extremity  Management Techniques: Activity promotion; Body mechanics;Repositioning    BALANCE                                                                                                                     Static Sitting: Fair +  Dynamic Sitting: Fair minimum assistance   Goal #3   Current Status 9ft with rolling walker min A x 2    Goal #4     Goal #4   Current Status     Goal #5 Patient to demonstrate independence with home activity/exercise instructions provided to patient in preparation for discharg

## 2019-02-11 NOTE — PROGRESS NOTES
[facility]    Progress Note    Edenilson Downs Patient Status:  Inpatient    1945 MRN [id number]   Location [facility] 2W/SW Attending Nelly Rosado, [address] [facility] Day # 15 PCP Darcy Reese MD     Subjective:  Feels tired chronic systolic heart failure (HCC)     RUQ abdominal pain     Acute on chronic congestive heart failure (HCC)     Exertional dyspnea     Orthopnea     Other ascites     Acute on chronic congestive heart failure, unspecified heart failure type ([facility]     A

## 2019-02-12 NOTE — PROGRESS NOTES
BG reviewed   Stable  Wr will follow BG peripherally  Please call with questions or concerns.      Angélica Godinez MD

## 2019-02-12 NOTE — PROGRESS NOTES
[facility]    Progress Note      Assessment and Plan:   1. Acute on chronic respiratory failure–the patient had CHF superimposed on obesity with probable JAI and obesity. The patient continues to improve. She remains on milrinone.     Rec CO2 30 02/11/2019     02/11/2019    CA 8.8 02/11/2019    ALB 3.2 02/11/2019    MG 1.9 02/11/2019    PHOS 3.3 02/11/2019     Chest x-ray–mild vascular congestion    Winston Hoffman MD  Medical Director, Critical Care, [address] [place] [address]  Medical Director, [facility]

## 2019-02-12 NOTE — PROGRESS NOTES
[facility]  Nephrology Daily Progress Note    Mable Jones  [id number]  68year old      HPI:   Mable Jones is a 68year old female.   C/O aches and pains but up in chair and ambulated with PT.       ROS:     Constitutional:  Negative for 02/12/2019    HGB 8.8 02/12/2019    HCT 29.1 02/12/2019    .0 02/12/2019    CREATSERUM 1.98 02/12/2019    BUN 68 02/12/2019     02/12/2019    K 4.1 02/12/2019    CL 93 02/12/2019    CO2 29 02/12/2019     02/12/2019    CA 8.8 02/12/2019 Continuous  •  Metoprolol Succinate ER (Toprol XL) 24 hr tab 25 mg, 25 mg, Oral, 2x Daily(Beta Blocker)  •  insulin detemir (LEVEMIR) 100 UNIT/ML flextouch 18 Units, 18 Units, Subcutaneous, Before dinner  •  Insulin Aspart Pen (NOVOLOG) 100 UNIT/ML flexpen prep  Iodine  [Gnp Iodine]    UNKNOWN    Input/Output:    Intake/Output Summary (Last 24 hours) at 2/12/2019 1130  Last data filed at 2/12/2019 0935  Gross per 24 hour   Intake 1060.3 ml   Output 725 ml   Net 335.3 ml          ASSESSMENT/PLAN:   Assessment

## 2019-02-12 NOTE — PROGRESS NOTES
[facility] Cardiology Progress Note      Monalisa Jelena Patient Status:  Inpatient    1945 MRN [id number]   Location [facility] 3W/SW Attending Jonah Osborne MD   [facility] Day # 15 PCP Bethel Tamayo MD 02/10 0700 - 02/11 0659 02/11 0700 - 02/12 0659 02/12 0700 - 02/13 0659    P. O. 450 1180 180    I.V. (mL/kg) 330.3 (3.4) 80.3 (0.8)     Other       IV PIGGYBACK 100      Total Intake(mL/kg) 880.3 (9) 1260.3 (12.6) 180 (1.8)    Urine (mL/kg/hr) 1425 (0 Lab  02/10/19   0422  02/11/19   0525  02/12/19   0457   GLU  158*  115*  141*   BUN  65*  66*  68*   CREATSERUM  1.57*  1.80*  1.98*   GFRAA  37*  32*  28*   GFRNAA  32*  28*  25*   CA  8.3*  8.8  8.8   NA  134*  137  134*   K  4.1  3.9  4.1   CL  94*

## 2019-02-12 NOTE — OCCUPATIONAL THERAPY NOTE
OCCUPATIONAL THERAPY TREATMENT NOTE - INPATIENT        Room Number: 325/325-A           Presenting Problem: (exertional dyspnea, s/p cath)    Problem List  Principal Problem:    Exertional dyspnea  Active Problems:    Acute on chronic congestive heart fail arm  BP Method: Automatic  Patient Position: Lying    O2 SATURATIONS        SPO2 Ambulation on Oxygen: 96  Ambulation oxygen flow (liters per minute): 2    ACTIVITIES OF DAILY LIVING ASSESSMENT  AM-PAC ‘6-Clicks’ Inpatient Daily Activity Short Form  How mu

## 2019-02-12 NOTE — PLAN OF CARE
CARDIOVASCULAR - ADULT    • Maintains optimal cardiac output and hemodynamic stability Progressing    • Absence of cardiac arrhythmias or at baseline Progressing        Diabetes/Glucose Control    • Glucose maintained within prescribed range Progressing understanding

## 2019-02-12 NOTE — PROGRESS NOTES
[facility]    Progress Note    Hugh Tejada Patient Status:  Inpatient    1945 MRN [id number]   Location [facility] 3W/SW Attending Weston eRd MD   [facility] Day # 15 PCP Maisha Hamm MD       Subjective:   Hugh Tejada deformities  Extremities: 2+ edema  Neurological:  Grossly normal    Results:     Laboratory Data:  Lab Results   Component Value Date    WBC 12.0 (H) 02/11/2019    HGB 8.9 (L) 02/11/2019    HCT 29.5 (L) 02/11/2019    .0 02/11/2019    CREATSERUM 1.8

## 2019-02-12 NOTE — PHYSICAL THERAPY NOTE
PHYSICAL THERAPY TREATMENT NOTE - INPATIENT     Room Number: 325/325-A       Presenting Problem: exertional dyspnea, s/p cath    Problem List  Principal Problem:    Exertional dyspnea  Active Problems:    Acute on chronic congestive heart failure ([facility] [address] training;Stair training;Transfer training;Balance training;Strengthening    SUBJECTIVE  \"I walked all that way\"    OBJECTIVE  Precautions: Bed/chair alarm    WEIGHT BEARING RESTRICTION  Weight Bearing Restriction: None                PAIN ASSESSMENT   Ra minimum assistance      Goal #1   Current Status Not tested    Goal #2 Patient is able to demonstrate transfers Sit to/from Stand at assistance level: minimum assistance with walker - rolling      Goal #2  Current Status Mod A x 2 from bedside chair and mo

## 2019-02-12 NOTE — PROGRESS NOTES
[facility]    Progress Note    Bella Half Patient Status:  Inpatient    1945 MRN [id number]   Location [facility] 2W/SW Attending Christos Hanks MD   [facility] Day # 15 PCP Katie Roberts MD       SUBJECTIVE:    Feeling ti 5,000 Units 5,000 Units Subcutaneous 2 times per day   furosemide (LASIX) injection 40 mg 40 mg Intravenous BID (Diuretic)   milrinone (PRIMACOR) 20mg in D5W 100 mL premix infusion 0.25 mcg/kg/min Intravenous Continuous   Metoprolol Succinate ER (Toprol XL bilateral     Rib pain on right side     Cardiac defibrillator in place     BMI 39.0-39.9,adult     Severe obesity (BMI 35.0-39. 9) with comorbidity ([facility] [address]     Right foot pain     Acute gout due to renal impairment involving left ankle     Primary osteoarthr

## 2019-02-13 NOTE — CM/SW NOTE
Case Management /Progression of Care    Per Nephrology, \" CXR still shows CHF and on 2 L NC. Creatinine still creeping up. Now 2.37. GFR  20. Unfortunately may require dialysis in order to control volume status and azotemia. \"    Currently not yet m

## 2019-02-13 NOTE — PROGRESS NOTES
Pulmonary/Critical Care Follow Up Note    HPI:   Samuel Friday is a 68year old female with Patient presents with:  Dyspnea JAZLYN SOB (respiratory)      PCP Mark Lawson, MD  Admission Attending Jessica Viera MD    Hospital Day #15      Refused NIV and m CC  •  hydrALAzine HCl (APRESOLINE) tab 20 mg, 20 mg, Oral, Q8H DEEDEE  •  isosorbide dinitrate (ISORDIL TITRADOSE) tab 5 mg, 5 mg, Oral, TID (Nitrates)  •  Insulin Aspart Pen (NOVOLOG) 100 UNIT/ML flexpen 1-5 Units, 1-5 Units, Subcutaneous, TID CC  •  Normal hours) at 2/13/2019 1306  Last data filed at 2/13/2019 0748  Gross per 24 hour   Intake 680.23 ml   Output 400 ml   Net 280.23 ml     Sitting up on edge of bed  Lung b/l crcakles  cv reg  abd soft +bs  Ext + edema      LABS:    Lab Results   Component Valu

## 2019-02-13 NOTE — PHYSICAL THERAPY NOTE
PHYSICAL THERAPY TREATMENT NOTE - INPATIENT     Room Number: 325/325-A       Presenting Problem: exertional dyspnea, s/p cath    Problem List  Principal Problem:    Exertional dyspnea  Active Problems:    Acute on chronic congestive heart failure ([facility] [address] None                PAIN ASSESSMENT   Rating: Unable to rate  Location: right lower extremity  Management Techniques: Activity promotion; Body mechanics;Repositioning    BALANCE #3 Patient is able to ambulate 10 feet with assist device: walker - rolling at assistance level: minimum assistance   Goal #3   Current Status 50ft with rolling walker with min A x 1    Goal #4     Goal #4   Current Status     Goal #5 Patient to demonstrat

## 2019-02-13 NOTE — PROGRESS NOTES
[facility]    Progress Note      Assessment and Plan:   1. Acute on chronic respiratory failure–the patient had CHF superimposed on obesity with probable JAI and obesity.   The patient has not been in negative fluid balance for the last 48 02/12/2019    CL 93 02/12/2019    CO2 29 02/12/2019     02/12/2019    CA 8.8 02/12/2019    MG 1.9 02/12/2019     Chest x-ray–mild vascular congestion    Winston Hoffman MD  Medical Director, Critical Care, [address] [place] [address]  Medical Director, [facility]

## 2019-02-13 NOTE — PROGRESS NOTES
[facility]  Nephrology Daily Progress Note    Rubina Brown  [id number]  68year old      HPI:   Rubina Brown is a 68year old female. C/O insomnia and aches and pains but no increase in SOB.        ROS:     Constitutional:  Negative for d 02/13/2019    HGB 8.7 02/13/2019    HCT 28.9 02/13/2019    .0 02/13/2019    CREATSERUM 2.37 02/13/2019    BUN 72 02/13/2019     02/13/2019    K 4.5 02/13/2019    CL 96 02/13/2019    CO2 33.0 02/13/2019     02/13/2019    CA 8.9 02/13/201 Subcutaneous, 2 times per day  •  milrinone (PRIMACOR) 20mg in D5W 100 mL premix infusion, 0.25 mcg/kg/min, Intravenous, Continuous  •  Metoprolol Succinate ER (Toprol XL) 24 hr tab 25 mg, 25 mg, Oral, 2x Daily(Beta Blocker)  •  insulin detemir (LEVEMIR) 1 straight  Iodine (Topical)        RASH  Povidone Iodine         ITCHING    Comment:Rash/Itching following surgical prep  Iodine  [Gnp Iodine]    UNKNOWN    Input/Output:    Intake/Output Summary (Last 24 hours) at 2/13/2019 1015  Last data filed at 2/13/20

## 2019-02-13 NOTE — OCCUPATIONAL THERAPY NOTE
OCCUPATIONAL THERAPY TREATMENT NOTE - INPATIENT        Room Number: 325/325-A           Presenting Problem: (exertional dyspnea, s/p cath)    Problem List  Principal Problem:    Exertional dyspnea  Active Problems:    Acute on chronic congestive heart fail currently need…  -   Putting on and taking off regular lower body clothing?: A Lot  -   Bathing (including washing, rinsing, drying)?: A Lot  -   Toileting, which includes using toilet, bedpan or urinal? : A Lot  -   Putting on and taking off regular upper

## 2019-02-13 NOTE — PROGRESS NOTES
[facility] Progress Note      Tiffanie Obregon Patient Status:  Inpatient    1945 MRN [id number]   Location [facility] 3W/SW Attending Kristina Norman MD   [facility] Day # 13 PCP Fito Wolff MD nondistended, no organomegaly, bowel sounds present  Ext:  no clubbing, no cyanosis, 2+ bilat LE edema up to knees   Neuro: no focal deficits  Skin: no rashes or lesions    Scheduled Meds:   • sodium chloride       • epoetin devi  10,000 Units Subcutaneous (cpt=71045)    Result Date: 2/13/2019  CONCLUSION:  Postthoracotomy chest demonstrating predominantly with pulmonary vascular congestion. There has been no significant interval change.     Dictated by (CST): General Elbret MD on 2/13/2019 at 8:54     Appr

## 2019-02-13 NOTE — CM/SW NOTE
Case Management/ Progression of Care  DX. Acute on Chronic  Renal disease and CHF  Met with patient at the Bedside,  Plans for ELSIE when medically cleared for discharge. Patient requesting [facility], referrals sent by JENAE      /

## 2019-02-13 NOTE — CARDIAC REHAB
CARDIAC REHAB HEART FAILURE EDUCATION    Handouts provided and reviewed: CHF Booklet. Activity: Chair for all meals:        Ambulation:        Tolerated Activity:          Disease Process: Disease process reviewed.     Reviewed the following: Mick Bosworth

## 2019-02-13 NOTE — PROGRESS NOTES
[place] [facility]    Progress Note    Mable Jones Patient Status:  Inpatient    1945 MRN [id number]   Location [facility] 2W/SW Attending Cam Menchaca MD   [facility] Day # 13 PCP Cyndi Mcpherson MD       SUBJECTIVE:    Feeling ti Dictated by (CST): Erin Andino MD on 2/13/2019 at 8:54     Approved by (CST): Erin Andino MD on 2/13/2019 at 8:58            Meds:     Current Facility-Administered Medications:  sodium chloride 0.9% infusion      milrinone (PRIMACOR) 20mg in D5W (PROTONIX) EC tab 40 mg 40 mg Oral BID AC   PEG 3350 (MIRALAX) powder packet 17 g 17 g Oral PRN   atorvastatin (LIPITOR) tab 20 mg 20 mg Oral Nightly         Assessment  Patient Active Problem List:     Coronary atherosclerosis     Primary cardiomyopathy ( paracentesis    Anemia of Ch.  Disease  -stable    Dispo: pending - may need rehab on d/c    Greater than 35 minutes spent, >50% spent counseling re: treatment plan and workup

## 2019-02-14 NOTE — PROGRESS NOTES
[facility] Cardiology Progress Note      Monalisa Jelena Patient Status:  Inpatient    1945 MRN [id number]   Location [facility] 3W/SW Attending Jonah Osborne MD   [address] Day # 12 PCP Bethel Tamayo MD (mL/kg/hr) 1425 (0.6) 725 (0.3)     Total Output 1425 725     Net -544.7 +535.3 +180           Stool Occurrence 1 x           Wt Readings from Last 6 Encounters:  02/14/19 : 215 lb 2 oz (97.6 kg)  01/29/19 : 224 lb (101.6 kg)  01/29/19 : 214 lb (97.1 kg) 3.09*   HGB  8.8*  8.7*  8.8*   HCT  29.1*  28.9*  29.2*   MCV  94.8  94.4  94.5   MCH  28.7  28.4  28.5   MCHC  30.2*  30.1*  30.1*   RDW  16.2*  16.2*  16.5*   NEPRELIM  9.82*  9.42*  11.05*   WBC  12.2*  11.7*  13.5*   PLT  334.0  358.0  428.0       Lab

## 2019-02-14 NOTE — PHYSICAL THERAPY NOTE
PHYSICAL THERAPY TREATMENT NOTE - INPATIENT     Room Number: 325/325-A       Presenting Problem: exertional dyspnea, s/p cath    Problem List  Principal Problem:    Exertional dyspnea  Active Problems:    Acute on chronic congestive heart failure ([facility] [address] Sitting: Fair           Static Standing: Fair -  Dynamic Standing: Poor +    ACTIVITY TOLERANCE  Pulse: 120(128 post mobility)  Heart Rate Source: Monitor                   O2 WALK        SPO2 Ambulation on Oxygen: 96  Ambulation oxygen flow (liters per mi #3 Patient is able to ambulate 10 feet with assist device: walker - rolling at assistance level: minimum assistance   Goal #3   Current Status 20' x 2 with rolling walker with min A x 1    Goal #4     Goal #4   Current Status     Goal #5 Patient to demonst

## 2019-02-14 NOTE — PROGRESS NOTES
[place] [facility] - [facility]    Progress Note    Rubina Stephanie Patient Status:  Inpatient    1945 MRN [id number]   Location [facility] 3W/SW Attending Collins Kirkland MD   [facility] Day # 16 PCP Josee Mendoza MD       Subjective:   Rubina Brown good strength  no deformities  Extremities:3+ edema  Neurological:  Grossly normal    Results:     Laboratory Data:  Lab Results   Component Value Date    WBC 13.5 (H) 02/14/2019    HGB 8.8 (L) 02/14/2019    HCT 29.2 (L) 02/14/2019    .0 02/14/2019

## 2019-02-14 NOTE — OCCUPATIONAL THERAPY NOTE
OCCUPATIONAL THERAPY TREATMENT NOTE - INPATIENT        Room Number: 325/325-A  Presenting Problem: (exertional dyspnea, s/p cath)    Problem List  Principal Problem:    Exertional dyspnea  Active Problems:    Acute on chronic congestive heart failure ([facility] [address] training; Endurance training;Energy conservation/work simplification techniques    SUBJECTIVE  Pt offering no complaints    OBJECTIVE  Precautions: Cardiac    WEIGHT BEARING RESTRICTION  Weight Bearing Restriction: None    PAIN ASSESSMENT  Ratin  Locati completed grooming task in sitting w/ min a to  squeeze soap due to c/o hand weakness    Patient will tolerate standing for 3 minutes in prep for adls with mod a    Comment: pt maintained static standing for 2 min w/ rw and cga   Patient will complete le d

## 2019-02-14 NOTE — PROGRESS NOTES
[facility]    Progress Note    Baker Friday Patient Status:  Inpatient    1945 MRN [id number]   Location [facility] 2W/SW Attending Sue Branch MD   [facility] Day # 12 PCP Mark Lawson MD       SUBJECTIVE:    Feeling ti demonstrating predominantly with pulmonary vascular congestion. There has been no significant interval change.     Dictated by (CST): Mayda Denton MD on 2/13/2019 at 8:54     Approved by (CST): Mayda Denton MD on 2/13/2019 at 8:58            Meds: chewable tab 81 mg 81 mg Oral Daily   Montelukast Sodium (SINGULAIR) tab 10 mg 10 mg Oral Nightly   Pantoprazole Sodium (PROTONIX) EC tab 40 mg 40 mg Oral BID AC   PEG 3350 (MIRALAX) powder packet 17 g 17 g Oral PRN   atorvastatin (LIPITOR) tab 20 mg 20 mg recs    Afib  - rates controlled  - s/p watchman device    CAD  - s/p CABG  - cont med management    DM type 2  - seen by endo    Ascites   - likely related to CHF  - s/p paracentesis    Anemia of Ch.  Disease  -stable    Dispo: pending - may need rehab on

## 2019-02-14 NOTE — WOUND PROGRESS NOTE
Pt was seen sitting up in bed, feet dangling off side. Pt stated she was \"comfortable that way\". I discussed again how important it was for her to elevate her feet. Pt stated understanding. There are 1 ACE wrap to each leg from toes to knee.  I discussed

## 2019-02-15 NOTE — CM/SW NOTE
Case Management/ Progression of Care    Acute Kidney failure, started on dialysis Friday 2/15/2019, will need Rehab. Patient requesting [facility], not able to accept due to Medical Complexity. Referrals sent to [facility] and [facility].    Will

## 2019-02-15 NOTE — PROGRESS NOTES
[facility]  Nephrology Daily Progress Note    Royer Atkins  [id number]  68year old      HPI:   Royer Atkins is a 68year old female. Feeling about the same. No significant changes.  Permacath placement went well this am.  Eating pretty age    Labs:  Lab Results   Component Value Date    WBC 13.3 02/15/2019    HGB 8.7 02/15/2019    HCT 29.3 02/15/2019    .0 02/15/2019    CREATSERUM 2.39 02/15/2019    CREATSERUM 2.39 02/15/2019    BUN 82 02/15/2019    BUN 82 02/15/2019     02/ 21 Units, Subcutaneous, Before dinner  •  Insulin Aspart Pen (NOVOLOG) 100 UNIT/ML flexpen 10 Units, 10 Units, Subcutaneous, TID CC  •  Albumin Human (ALBUMINAR) 25 % solution 100 mL, 100 mL, Intravenous, PRN  •  bumetanide (BUMEX) injection 2 mg, 2 mg, In Nightly    Allergies:    Bupropion               OTHER (SEE COMMENTS)    Comment:Other reaction(s): Other (see comments)             Pt states she got very confused and dizzy when             taking this medication. Other reaction(s):  Other             (See 2/15/2019  Genie Reich MD

## 2019-02-15 NOTE — PROCEDURES
[facility]  Procedure Note    Jason Karimi Patient Status:  Inpatient    1945 MRN [id number]   Location [facility] Attending Shanelle Campuzano MD   [facility] Day # 16 PCP Layne More MD     Procedure:  Cleone Kocher

## 2019-02-15 NOTE — PROGRESS NOTES
[facility] SERVICE PROGRESS NOTE      Patient: Lulu Cohen Date: 2019     : 1945 Attending: Mariam Newell MD   68year old female      A/P:  Acute on chronic HFrEF (EF 25-30%) s/p ICD likely ischemic etiology; history of mg Oral TID (Nitrates)   • Insulin Aspart Pen  1-5 Units Subcutaneous TID CC   • allopurinol  100 mg Oral Daily   • aspirin  81 mg Oral Daily   • Montelukast Sodium  10 mg Oral Nightly   • Pantoprazole Sodium  40 mg Oral BID AC   • atorvastatin  20 mg Oral 2/1/19  CONCLUSIONS  RESULTS:  HEMODYNAMIC DATA at baseline (on BiPap, dobutamine 2.5 mcg/kg/min,  and after 3 L paracentesis earlier today)  /52/70 mmHg  RA12 mmHg V waves = 37 mmHg  RV52/12mmHg  PA50/19/29mmHg  [id number] V waves = 25 mmHg  Cardiac regurgitation. 3. Mitral valve: Mildly calcified annulus. Tethering of the anterior     leaflet. There was moderate regurgitation. Regurgitant volume (PISA):     31ml. Regurgitant fraction (PISA): 29%.   4. Left atrium: The left atrium was moderately dilat

## 2019-02-15 NOTE — PLAN OF CARE
Inpatient Throughput Communication:    Called inpatient RN [facility] and notified of scheduled procedure IR Tunneled CV Cath Insertion  on 2/15/2019. Verified that appropriate consent is signed: Yes  Appropriate Consent Signed:  Yes  Access Site Hair Cli

## 2019-02-15 NOTE — PROGRESS NOTES
[place] [facility] - [facility]    Progress Note    Seymour Fly Patient Status:  Inpatient    1945 MRN [id number]   Location [facility] 2W/SW Attending Drew Cohen MD   [facility] Day # 16 PCP Shelly Rees MD       SUBJECTIVE:    Feeling ti predominantly with pulmonary vascular congestion. There has been no significant interval change.     Dictated by (CST): Edmond Duarte MD on 2/13/2019 at 8:54     Approved by (CST): Edmond Duarte MD on 2/13/2019 at 8:58          Ir Tunneled Cv Cath Inser injection 50 mL 50 mL Intravenous PRN   Glucose-Vitamin C (DEX-4) 4-6 GM-MG chewable tab 4 tablet 4 tablet Oral Q15 Min PRN   glucose (DEX4) oral liquid 15 g 15 g Oral Q15 Min PRN   Atropine Sulfate 0.1 MG/ML injection 0.5 mg 0.5 mg Intravenous PRN   nitro bipap    Acute on Chronic systolic heart failure  - started milrinone and lasix 40 IV BID - now stopped lasix with increased creatinine  -d/w cards and renal about adjust dose  - cards following   - monitor daily weights and I/O  - weight is down (222 -->

## 2019-02-15 NOTE — PRE-SEDATION ASSESSMENT
[place] [facility]  IR Pre-Procedure Sedation Assessment    History of snoring or sleep or apnea?    No    History of previous problems with anesthesia or sedation  No    Physical Findings:  Neck: nl ROM  CV: RRR  PULM: rales bilaterally    [place]

## 2019-02-16 NOTE — PROGRESS NOTES
[facility]    Progress Note    Melanie Province Patient Status:  Inpatient    1945 MRN [id number]   Location [facility] 2W/SW Attending Manjula Alvarez MD   [facility] Day # 25 PCP Suzy Villegas MD       SUBJECTIVE:    Feeling ti (CST): Bhavin Quinteros MD on 2/15/2019 at 14:08     Approved by (CST): Bhavin Quinteros MD on 2/15/2019 at 14:10            Meds:     Current Facility-Administered Medications:  TraMADol HCl (ULTRAM) tab 50 mg 50 mg Oral Q12H PRN   sodium chloride 0.9% (ZYLOPRIM) tab 100 mg 100 mg Oral Daily   aspirin chewable tab 81 mg 81 mg Oral Daily   Montelukast Sodium (SINGULAIR) tab 10 mg 10 mg Oral Nightly   Pantoprazole Sodium (PROTONIX) EC tab 40 mg 40 mg Oral BID AC   PEG 3350 (MIRALAX) powder packet 17 g 17 g mg TID  -Holding ACE-I/MRA for now due to renal dysfunction  - milrinone gtt       MARTIN on CKD stage 3  - seen by nephrology  - monitor renal function closely with diuresis - with increase in creatinine today await further renal/cards recs for diuresis  -di

## 2019-02-16 NOTE — PROGRESS NOTES
[place] [facility] - [facility]    Progress Note    Maria D Sonja Patient Status:  Inpatient    1945 MRN [id number]   Location [facility] 3W/SW Attending Stephania Livingston MD   [facility] Day # 18 PCP Erin Fowler MD         Assessment and Plan:    A Pen  10 Units Subcutaneous TID CC   • bumetanide  2 mg Intravenous Q12H   • epoetin devi  10,000 Units Subcutaneous Once per day on Mon Wed Fri   • melatonin  1 mg Oral Nightly   • hydrALAzine HCl  20 mg Oral Q8H [address]   • isosorbide dinitrate  5 mg Oral TID

## 2019-02-16 NOTE — PROGRESS NOTES
[facility]    Progress Note    Tavon Baxter Patient Status:  Inpatient    1945 MRN [id number]   Location [facility] 3W/SW Attending Pily Portillo MD   [facility] Day # 18 PCP Anthony Pereira MD       Subjective:   Tavon Baxter abnormalities noted  Musculoskeletal: full ROM all extremities good strength  no deformities  Extremities: 2+ edema  Neurological:  Grossly normal    Results:     Laboratory Data:  Lab Results   Component Value Date    WBC 10.4 02/16/2019    HGB 8.7 (L) 02

## 2019-02-16 NOTE — PHYSICAL THERAPY NOTE
Attempted to see pt; pt currently undergoing dialysis. PT with follow up at the end of the day if schedule allows or tomorrow.

## 2019-02-17 NOTE — PROGRESS NOTES
[facility]    Progress Note    Elvin Cortes Patient Status:  Inpatient    1945 MRN [id number]   Location [facility] 3W/SW Attending Jamshid Queen MD   [facility] Day # 23 PCP Viar Chapa MD         Assessment and Plan:    Acute epoetin devi  10,000 Units Subcutaneous Once per day on Mon Wed Fri   • melatonin  1 mg Oral Nightly   • hydrALAzine HCl  20 mg Oral Q8H [facility]   • isosorbide dinitrate  5 mg Oral TID (Nitrates)   • Insulin Aspart Pen  1-5 Units Subcutaneous TID CC   • allopu

## 2019-02-17 NOTE — PHYSICAL THERAPY NOTE
PHYSICAL THERAPY TREATMENT NOTE - INPATIENT     Room Number: 325/325-A       Presenting Problem: exertional dyspnea, s/p cath    Problem List  Principal Problem:    Exertional dyspnea  Active Problems:    Acute on chronic congestive heart failure ([facility] [address] PAIN ASSESSMENT   Ratin  Location: right lower extremity  Management Techniques: Activity promotion; Body mechanics;Repositioning    BALANCE ambulate 10 feet with assist device: walker - rolling at assistance level: minimum assistance   Goal #3   Current Status 50ft with rolling walker CGA   Goal #4     Goal #4   Current Status     Goal #5 Patient to demonstrate independence with home activity/

## 2019-02-17 NOTE — OCCUPATIONAL THERAPY NOTE
OCCUPATIONAL THERAPY TREATMENT NOTE - INPATIENT        Room Number: 325/325-A           Presenting Problem: (exertional dyspnea, s/p cath)    Problem List  Principal Problem:    Exertional dyspnea  Active Problems:    Acute on chronic congestive heart fail conservation/work simplification techniques    SUBJECTIVE  \"I wish you would come more often\"     OBJECTIVE  Precautions: Cardiac    WEIGHT BEARING RESTRICTION  Weight Bearing Restriction: None         PAIN ASSESSMENT  Ratin  Location: (low back pain sink for [facility] for 5 minutes with CGA   Comment: goal met and upgraded    Patient will complete le dressing task w/ ae and min a Comment: mod a        Goals  on:   Frequency: 3-5x a week    Monik Foster MOT/R  Occupational Therapy   [place]

## 2019-02-17 NOTE — PROGRESS NOTES
[place] [facility] - [facility]    Progress Note    Tristen Garcia Patient Status:  Inpatient    1945 MRN [id number]   Location [facility] 3W/SW Attending Marshall Israel MD   [facility] Day # 23 PCP Master Motta MD       Subjective:   Tristen Garcia is hydrALAzine HCl  20 mg Oral Q8H [address]   • isosorbide dinitrate  5 mg Oral TID (Nitrates)   • Insulin Aspart Pen  1-5 Units Subcutaneous TID CC   • allopurinol  100 mg Oral Daily   • aspirin  81 mg Oral Daily   • Montelukast Sodium  10 mg Oral Nightly   • Pan 19*  20*  20*  27*  27*   CA  8.9  8.8  8.8  8.8  8.8  8.4*  8.6   ALB  3.5  3.4  3.2*  3.4   --    NA  137  133*  133*  132*  132*  135*  136   K  4.5  4.2  4.2  4.0  4.0  3.9  4.6   CL  96*  95*  95*  94*  94*  96*  99   CO2  33.0*  29.0  29.0  30.0  30. and TOVA per Nephro  - Appreciate Nephro rec     #Acute on Chronic systolic heart failure  #CAD s/p CABG  - Failed diuresis for volume overload.  Currently on HD  - Continue Toprol to 50 mg XL BID, hydralazine 20 mg TID, and Isordil 5 mg TID  - Milrinone gtt

## 2019-02-18 NOTE — OCCUPATIONAL THERAPY NOTE
OCCUPATIONAL THERAPY TREATMENT NOTE - INPATIENT        Room Number: 325/325-A           Presenting Problem: (exertional dyspnea, s/p cath)    Problem List  Principal Problem:    Exertional dyspnea  Active Problems:    Acute on chronic congestive heart fail ASSESSMENT  AM-PAC ‘6-Clicks’ Inpatient Daily Activity Short Form  How much help from another person does the patient currently need…  -   Putting on and taking off regular lower body clothing?: A Lot  -   Bathing (including washing, rinsing, drying)?: A L Jalen Shah MA, OTR/L  Occupational Therapist

## 2019-02-18 NOTE — PHYSICAL THERAPY NOTE
PHYSICAL THERAPY TREATMENT NOTE - INPATIENT     Room Number: 325/325-A       Presenting Problem: exertional dyspnea, s/p cath    Problem List  Principal Problem:    Exertional dyspnea  Active Problems:    Acute on chronic congestive heart failure ([facility] [address] Activity promotion; Body mechanics;Repositioning    BALANCE                                                                                                                     Static Sitting: Good  Dynamic Sitting: Good           Static Standing: Fair +  Dy Goal #3   Current Status 75ft with rolling walker CGA   Goal #4     Goal #4   Current Status     Goal #5 Patient to demonstrate independence with home activity/exercise instructions provided to patient in preparation for discharge.    Goal #5   Current St

## 2019-02-18 NOTE — OCCUPATIONAL THERAPY NOTE
Pt at hemodialysis. Not available for OT treatment. Will reschedule as able.      Ace Wilson MA, OTR/L  Occupational Therapist

## 2019-02-18 NOTE — PROGRESS NOTES
[facility]  Nephrology Daily Progress Note    Sheng Flores  [id number]  68year old      HPI:   Sheng Flores is a 68year old female. HD just completed. Tolerated well with 3 L fluid remova. Eating well.        ROS:     Constitutional: 9.5 02/18/2019    HGB 8.7 02/18/2019    HCT 29.4 02/18/2019    .0 02/18/2019    CREATSERUM 2.09 02/18/2019    CREATSERUM 2.09 02/18/2019    BUN 47 02/18/2019    BUN 47 02/18/2019     02/18/2019     02/18/2019    K 4.2 02/18/2019    K 4.2 Oral, Q12H PRN  •  Metoprolol Succinate ER (Toprol XL) 24 hr tab 50 mg, 50 mg, Oral, 2x Daily(Beta Blocker)  •  Insulin Aspart Pen (NOVOLOG) 100 UNIT/ML flexpen 10 Units, 10 Units, Subcutaneous, TID CC  •  Albumin Human (ALBUMINAR) 25 % solution 100 mL, 10 (SEE COMMENTS)    Comment:Other reaction(s): Other (see comments)             Pt states she got very confused and dizzy when             taking this medication. Other reaction(s):  Other             (See Comments)             Couldn't walk straight  Iodine ( Next  HD on Wed.  Discussed with RN             2/18/2019  Edwige Dumont MD

## 2019-02-18 NOTE — PROGRESS NOTES
[facility] - [facility]    Progress Note    Reilly Boogie Patient Status:  Inpatient    1945 MRN [id number]   Location [facility] 3W/SW Attending Lexy Mcmillan MD   Hosp Day # 21 PCP Delaney Thomas MD       Subjective:   Reilly Boogie is Oral Q8H [facility]   • isosorbide dinitrate  5 mg Oral TID (Nitrates)   • Insulin Aspart Pen  1-5 Units Subcutaneous TID CC   • allopurinol  100 mg Oral Daily   • aspirin  81 mg Oral Daily   • Montelukast Sodium  10 mg Oral Nightly   • Pantoprazole Sodium  40 mg 02/16/19   0608  02/17/19   0553  02/18/19   0524   GLU  142*   < >  123*  123*  100*  159*  83  83   BUN  72*   < >  82*  82*  55*  38*  47*  47*   CREATSERUM  2.37*   < >  2.39*  2.39*  1.85*  1.83*  2.09*  2.09*   GFRAA  23*   < >  23*  23*  31*  31*  2 Status: None    Collection Time: 01/30/19  4:37 PM   Result Value Ref Range    Blood Culture Result No Growth 5 Days N/A       Imaging/EKG:           Assessment and Plan:   Jake Cooney is a 79yo female w/ hx sig for CAD, HFrEF w/ ICD, Afib, DM2, and morb

## 2019-02-18 NOTE — PROGRESS NOTES
[facility]    Progress Note    Jeff Sr Patient Status:  Inpatient    1945 MRN [id number]   Location [facility] 3W/SW Attending Hamlet Marrero MD   [address] Day # 23 PCP Olivia Mayberry MD       Subjective:   Jeff Sr is normal    Results:     Laboratory Data:  Lab Results   Component Value Date    WBC 10.6 02/17/2019    HGB 9.0 (L) 02/17/2019    HCT 30.8 (L) 02/17/2019    .0 02/17/2019    CREATSERUM 1.83 (H) 02/17/2019    BUN 38 (H) 02/17/2019     02/17/2019

## 2019-02-19 NOTE — PROGRESS NOTES
[facility] [facility] [address] SERVICE PROGRESS NOTE      Patient: Tristen Garcia Date: 2019     : 1945 Attending: Marianne Ortiz., MD   68year old female      A/P:  Acute on chronic HFrEF (EF 25-30%) s/p ICD likely ischemic etiology; history of Oral Nightly         Vital Last Value 24 Hour Range   Temperature 97.5 °F (36.4 °C) Temp  Min: 96.3 °F (35.7 °C)  Max: 98.4 °F (36.9 °C)   Pulse 90 Pulse  Min: 69  Max: 72   Respiratory 16 Resp  Min: 18  Max: 24   Blood Pressure 92/57 BP  Min: 91/61  Max: output/cardiac index ( Assumed Leonardo) =5.8/2.9  [id number]  PVR2. 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 5. Right ventricle: The cavity size was mildly increased. Pacer wire noted     in the right ventricle. Systolic function was mildly reduced. 6. Right atrium: The atrium was moderately to markedly dilated. Pacer wire     noted in right atrium.

## 2019-02-19 NOTE — PROGRESS NOTES
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 Systolic function was mildly reduced. 6. Right atrium: The atrium was moderately to markedly dilated. Pacer wire     noted in right atrium.   7. Atrial septum: There was a residual atrial shunt post-transseptal     cathete

## 2019-02-19 NOTE — PROGRESS NOTES
[facility]  Nephrology Daily Progress Note    Lion Lanier  [id number]  68year old      HPI:   Lion Lanier is a 68year old female. Feeling well. Off O2.        ROS:     Constitutional:  Negative for decreased activity, fever, irritabi CREATSERUM 2.10 02/19/2019    BUN 34 02/19/2019     02/19/2019    K 4.5 02/19/2019    CL 98 02/19/2019    CO2 32.0 02/19/2019    GLU 82 02/19/2019    CA 8.6 02/19/2019    ALB 3.7 02/19/2019    MG 2.1 02/19/2019    PHOS 2.9 02/19/2019     Recent La Intravenous, PRN  •  bumetanide (BUMEX) injection 2 mg, 2 mg, Intravenous, Q12H  •  Miconazole Nitrate 2 % powder, , Topical, BID PRN  •  ipratropium-albuterol (DUONEB) nebulizer solution 3 mL, 3 mL, Nebulization, Q6H PRN  •  epoetin devi (EPOGEN,PROCRIT) Comment:Rash/Itching following surgical prep  Iodine  [Gnp Iodine]    UNKNOWN    Input/Output:    Intake/Output Summary (Last 24 hours) at 2/19/2019 1022  Last data filed at 2/19/2019 0600  Gross per 24 hour   Intake 700 ml   Output 3100 ml   Net -2400 ml

## 2019-02-19 NOTE — CM/SW NOTE
Addendum 4:24pm    YUMIKO informed by RN that pt is medically stable for discharge today at 4:30pm. YUMIKO spoke with [phone] who confirmed that they are able to accept the pt at that time.  Yumiko informed the pt of the d/c time

## 2019-02-19 NOTE — PROGRESS NOTES
[facility]    Progress Note    Jason Karimi Patient Status:  Inpatient    1945 MRN [id number]   Location [facility] 3W/SW Attending Eleonora Yen MD   [facility] Day # 21 PCP Layne More MD       Subjective:   Jason Karimi is Nightly   • isosorbide dinitrate  5 mg Oral TID (Nitrates)   • Insulin Aspart Pen  1-5 Units Subcutaneous TID CC   • allopurinol  100 mg Oral Daily   • aspirin  81 mg Oral Daily   • Montelukast Sodium  10 mg Oral Nightly   • Pantoprazole Sodium  40 mg Oral 23*   < >  31*  31*  26*  26*  26*   GFRNAA  20*   < >  27*  27*  23*  23*  23*   CA  8.9   < >  8.4*  8.6  8.5  8.5  8.6   ALB  3.5   < >  3.4   --   3.3*  3.7   NA  137   < >  135*  136  135*  135*  135*   K  4.5   < >  3.9  4.6  4.2  4.2  4.5   CL  96* exacerbation of CHF. #Hemodialysis  #MARTIN on CKD stage 3  #Anemia of CKD  - HD through [facility] since 2/15.  Next session tmrw  - Bumex and TOVA per Nephro  - Appreciate Nephro rec     #Acute on Chronic systolic heart failure  #CAD s/p CABG  - Failed diur

## 2019-02-19 NOTE — WOUND PROGRESS NOTE
Pt was seen as a follow up by wound services. Pt is sitting up in the chair, feet elevated. There is 1 ACE wrap to each leg from toes to knees. I again discussed trying tubigrip for better results, pt refused. Dressings removed.  The BLE have pitting edema

## 2019-02-20 NOTE — PROGRESS NOTES
[facility] SERVICE PROGRESS NOTE      Patient: Lulu Cohen Date: 2019     : 1945 Attending: Mariam Newell MD   68year old female      A/P:  Acute on chronic HFrEF (EF 25-30%) s/p ICD likely ischemic etiology; history of 81 mg Oral Daily   • Montelukast Sodium  10 mg Oral Nightly   • Pantoprazole Sodium  40 mg Oral BID AC   • atorvastatin  20 mg Oral Nightly         Vital Last Value 24 Hour Range   Temperature 97.5 °F (36.4 °C) Temp  Min: 96.3 °F (35.7 °C)  Max: 98.4 °F (3 (thermodilution)4.6/2.3  Cardiac output/cardiac index ( Assumed Leonardo) =5.8/2.9  [id number]  PVR2. 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 5. Right ventricle: The cavity size was mildly increased. Pacer wire noted     in the right ventricle. Systolic function was mildly reduced. 6. Right atrium: The atrium was moderately to markedly dilated.  Pacer wire     n

## 2019-02-20 NOTE — PROGRESS NOTES
[facility]  Nephrology Daily Progress Note    Reilly Boogie  [id number]  68year old      HPI:   Reilly Boogie is a 68year old female. Quiet night but felt SOB this am.  Now on HD and SOB resolved.        ROS:     Constitutional:  Negative 02/20/2019    .0 02/20/2019    CREATSERUM 2.40 02/20/2019    BUN 43 02/20/2019     02/20/2019    K 4.1 02/20/2019    CL 97 02/20/2019    CO2 29.0 02/20/2019    GLU 78 02/20/2019    CA 8.9 02/20/2019    ALB 3.6 02/20/2019    MG 2.3 02/20/2019 mg, 1 mg, Oral, Nightly  •  isosorbide dinitrate (ISORDIL TITRADOSE) tab 5 mg, 5 mg, Oral, TID (Nitrates)  •  Insulin Aspart Pen (NOVOLOG) 100 UNIT/ML flexpen 1-5 Units, 1-5 Units, Subcutaneous, TID CC  •  Normal Saline Flush 0.9 % injection 3 mL, 3 mL, In Chronic airway obstruction (HCC)     Type II diabetes mellitus ([facility] [address]     Essential hypertension     Hyperlipidemia     Pseudophakia of both eyes     ARMD (age-related macular degeneration), bilateral     Rib pain on right side     Cardiac defibrillator in

## 2019-02-20 NOTE — OCCUPATIONAL THERAPY NOTE
Attempted to work with patient; RN aware and provided consent to proceed with treatment; pt had just returned to bed from chair with PCT assist; was feeling SOB and c/o of discomfort; refusing therapy at this time; discussed with patient the importance of

## 2019-02-20 NOTE — TRANSITION NOTE
[facility]    Cardiology Discharge Summary    Monalisa Jelena Patient Status:  Inpatient    1945 MRN [id number]   Location [facility] 3W/SW Attending Jonah Osborne MD   [facility] Day # 25 PCP Bethel Tamayo MD     Discharge Summ 1 tablet (50 mg total) by mouth 2x Daily(Beta Blocker).    Quantity:  60 tablet  Refills:  5        CHANGE how you take these medications      Instructions Prescription details   Mometasone Furoate 0.1 % Crea  Commonly known as:  ELOCON  What changed:    · each  Refills:  0     simvastatin 40 MG Tabs  Commonly known as:  ZOCOR      TAKE 1 TABLET BY MOUTH EVERY EVENING   Quantity:  90 tablet  Refills:  0     triamcinolone acetonide 0.1 % Crea  Commonly known as:  KENALOG      APPLY EXTERNALLY TO THE AFFECTED 02/13/2019    ALT 14 02/13/2019    PTT 32.2 04/03/2018    INR 1.4 (H) 02/14/2019    T4F 1.25 01/30/2019    TSH 6.42 (H) 01/30/2019    LIP 36 12/11/2018     (H) 06/27/2016    MG 2.3 02/20/2019    PHOS 3.7 02/20/2019    B12 471 02/08/2019       Rahul Yu

## 2019-02-20 NOTE — PHYSICAL THERAPY NOTE
Attempted to see patient for physical therapy session. Patient having chest pain and just got back to bed, then patient going to HD. Will attempt to see patient tomorrow for physical therapy session. RN aware and agreeable.

## 2019-02-21 NOTE — TELEPHONE ENCOUNTER
sarah from [facility] re[facility] called requesting information on influenza vac, Prevnar 13, and pneumo vac 23

## 2019-02-21 NOTE — TELEPHONE ENCOUNTER
Contacted  Adrianna from [address] [place] [address] informed her of patients Immunization history, Prevnar 11/24/17, Pneumovax 1/24/12, Influenza 10/16/17.

## 2019-02-21 NOTE — DISCHARGE SUMMARY
[facility]    Discharge Summary    Edenilson Downs Patient Status:  Inpatient    1945 MRN [id number]   Location [facility] 3W/SW Attending No att. providers found   [facility] Day # 25 PCP Darcy Reese MD     Date of Admission: x3  Pulm: Lungs clear, normal respiratory effort  CV: Heart with regular rate and rhythm  Abd: Abdomen soft, nontender, nondistended, bowel sounds present  Neuro: No acute focal deficits  MSK: Full range of motion in extremities  Skin: Warm and dry  Psych: Prescription details   hydrALAzine HCl 10 MG Tabs  Commonly known as:  APRESOLINE      Take 1 tablet (10 mg total) by mouth every 8 (eight) hours.    Quantity:  90 tablet  Refills:  5     isosorbide dinitrate 5 MG Tabs  Commonly known as:  ISORDIL TITRADOSE Pantoprazole Sodium 40 MG Tbec  Commonly known as:  PROTONIX      Take 1 tablet (40 mg total) by mouth 2 (two) times daily before meals.    Quantity:  60 tablet  Refills:  2     Pen Needles 31G X 8 MM Misc      1 each by Does not apply route 2 (two) times

## 2019-02-26 NOTE — TELEPHONE ENCOUNTER
MIRI Cash from [facility] called to report edema in lower extremeties is getting worse and patient has crackles in her lungs. Resp rate slightly increased. Patient is currently not on any diuretics. Patient is getting dialysis now.

## 2019-02-27 NOTE — TELEPHONE ENCOUNTER
We are pulling flujid at dialysis   have her take bumex 1 mg po bid at her nursing home. Madison Palacios

## 2019-02-28 NOTE — TELEPHONE ENCOUNTER
Spoke to MIRI Summers and verified that she did get the message left on her cell phone and she said they did start patient on Bumex as Dr. Giles Clemons advised.

## 2019-03-01 PROBLEM — I50.22 CHRONIC SYSTOLIC HEART FAILURE (HCC): Status: RESOLVED | Noted: 2018-06-27 | Resolved: 2019-01-01

## 2019-03-01 PROBLEM — I50.9 ACUTE ON CHRONIC CONGESTIVE HEART FAILURE (HCC): Status: RESOLVED | Noted: 2019-01-01 | Resolved: 2019-01-01

## 2019-03-01 PROBLEM — I50.9 ACUTE ON CHRONIC CONGESTIVE HEART FAILURE, UNSPECIFIED HEART FAILURE TYPE (HCC): Status: RESOLVED | Noted: 2019-01-01 | Resolved: 2019-01-01

## 2019-03-01 NOTE — PATIENT INSTRUCTIONS
Please increase metoprolol succinate to 75 mg twice daily. Recommend palliative care consult    Continue tracking daily weights. Call with weight gain of 3 lbs overnight or concerning symptoms.    [phone]    32-52 oz fluid restriction    Less than 2

## 2019-03-01 NOTE — PROGRESS NOTES
[address] Patient Status:  Outpatient    1945 MRN [id number]   Location MD Dr Ruf[address] is a 68year old female who presents to clin AM     (L) 02/20/2019 05:51 AM    K 4.1 02/20/2019 05:51 AM    CL 97 (L) 02/20/2019 05:51 AM    CO2 29.0 02/20/2019 05:51 AM    GLU 78 02/20/2019 05:51 AM    CA 8.9 02/20/2019 05:51 AM    ALB 3.6 02/20/2019 05:51 AM    ALKPHO 88 02/13/2019 05:18 AM mmHg  RV 52/12 mmHg  PA 50/19/29 mmHg  PCWP 17 mmHg V waves = 25 mmHg     Cardiac output/cardiac index (thermodilution) 4.6/2.3  Cardiac output/cardiac index ( Assumed Leonardo) = 5.8/2.9        Education:  Reviewed disease process, sodium/fluid restriction, a

## 2019-03-02 NOTE — TELEPHONE ENCOUNTER
Received call from pt, states wanted to inform Konrad Arredondo PA-C that she is currently at [facility] for rehab. Pt is requesting to speak directly to Konrad Arredondo PA-C to discuss her rehab. Good call back number is [phone]. Pt aware Konrad Arredondo PA-C not in office today and willing to wait to hear from ORTIZ on Monday.

## 2019-03-05 NOTE — TELEPHONE ENCOUNTER
RN advised patient to go to dialysis - forwarded to [facility] as [address] Dr Rodri Metcalf called to state pt has low BP currently BP 81/49, HR 80 - asking if she can go to dialysis    NP states pt was given a beta blocker yesterday by PCP for elevated HR which was 110 last night - may be contributing to low BP now? C/o generalized weakness and fatigued, \"weak and tired\". NP triaged pt and states neuro checks ok. Labs fine.  No s/s of infection -afebrile, no cough, etc.

## 2019-03-05 NOTE — TELEPHONE ENCOUNTER
[facility] states that pts BP is 81/49 and pt is about to go to dialysis. She is concerned and would like to know if pt should even go to dialysis.   Call transferred to RN

## 2019-03-06 NOTE — TELEPHONE ENCOUNTER
Its me kailyn, Thanks for taking this call, surprised my nurse practitioner is calling you instead of me. Regardless I have adjusted the beta-blocker downwards with some hold parameters, as well as the isosorbide, and hydralazine. It looks like we are making movement/headway with the fluid removal on dialysis so nice job there.   She still has orders for Bumex 1 mg twice daily, it looks completely futile to me at this point, so I am going to stop the Bumex unless you have some objections, or think this still has a role in keeping her from being an anuric, thx for the help-damico

## 2019-03-18 PROBLEM — T78.40XA ALLERGIC REACTION: Status: ACTIVE | Noted: 2019-01-01

## 2019-03-18 PROBLEM — T78.40XA ALLERGIC REACTION, INITIAL ENCOUNTER: Status: ACTIVE | Noted: 2019-01-01

## 2019-03-18 NOTE — ED PROVIDER NOTES
Patient Seen in: [facility] 2w/sw    History   Patient presents with:   Allergic Rxn Allergies (immune)    Stated Complaint: allergic rxn to vanco    HPI    79-year-old female with multiple medical problems presents for evaluation of possible aller Merari Drummond MD at [address] [place] [address] ENDOSCOPY   • ESOPHAGOGASTRODUODENOSCOPY (EGD) N/A 4/4/2018    Performed by Marycarmen Moore MD at [address] [place] [address] ENDOSCOPY   • ESOPHAGOGASTRODUODENOSCOPY (EGD) N/A 4/3/2018    Performed by Marycarmen Moore MD at [address] [place] [address] ENDOSCO no guarding. Musculoskeletal: Normal range of motion. Neurological: She is alert and oriented to person, place, and time. No focal deficit   Skin: Skin is warm and dry. No rash noted.    Redness and warmth to bilateral lower extremities   Psychiatric: RAINBOW DRAW LIGHT GREEN   RAINBOW DRAW GOLD     EKG    Rate, intervals and axes as noted on EKG Report.   Rate: 136  Rhythm: Atrial fibrillation  Reading: Atrial fibrillation with left bundle branch block              Imaging Results Available and Review Dictated by (CST): Dashawn Brtio MD on 3/18/2019 at 13:15         Approved by (CST): Dashawn Brito MD on 3/18/2019 at 13:19                 ED Medications Administered:   Medications   clindamycin in D5W (CLEOCIN) premix 600mg/50ml (not adm right side; Cardiac defibrillator in place; BMI 39.0-39.9,adult; Severe obesity (BMI 35.0-39. 9) with comorbidity ([facility] [address] Right foot pain; Acute gout due to renal impairment involving left ankle; Primary osteoarthritis involving multiple joints;  Upper GI ble tachycardia. Discussed with and seen by cardiology. Further Inpatient evaluation and treatment will be required.  I personally discussed the results of the above ED workup and a number of associated acute management issues with the patient, and I explaine

## 2019-03-18 NOTE — H&P
[facility]    PATIENT'S NAME: Mark Wang   ATTENDING PHYSICIAN: Ney Barnett MD   PATIENT ACCOUNT#:   [id number]    LOCATION:  [place] [address]  MEDICAL RECORD #:   [id number]       YOB: 1945  ADMISSION DATE:       03/18/201 chronic kidney disease with recent development of end-stage renal disease, currently on hemodialysis; diabetes mellitus type 2; anemia of chronic kidney disease; hypertension; hyperlipidemia.   Recent right heart catheterization showed severe tricuspid regu murmur. ABDOMEN:  Soft, nondistended. No tenderness. Positive bowel sounds. EXTREMITIES:  Edema +2 to 3, both legs with erythema involving bilateral anterior legs.   There are small superficial ulcerations on the anterior and lateral aspect of the dista

## 2019-03-18 NOTE — ED INITIAL ASSESSMENT (HPI)
Via EMS from [facility] --sent for \"allergic rxn\" Patient started Vancomycin Thursday (has taken it twice) and had \"itching all over. \"  Given benadryl at 5 AM with much relief per patient

## 2019-03-18 NOTE — CONSULTS
[facility] HOSP - [facility]    Report of Cardiology Consultation    Elvin Cortes Patient Status:  Emergency    1945 MRN [id number]   Location [address] Attending Chana English MD   [facility] Day # 0 PCP Vira Chapa, Medical History  Past Medical History:   Diagnosis Date   • Arrhythmia     AICD/Pacer   • Atherosclerosis of coronary artery 2010   • Basal cell carcinoma, forehead 2012    excision   • Cataract 2004    OU   • CKD (chronic kidney disease) stage 3, GFR 30-5 Social History  Patient Guardian Status:  Not on file    Other Topics            Concern  Caffeine Concern        Yes    Comment:coffee, 3cups/day    Social History Narrative    None on file            Current Medications:    No current facility-admi 03/18/2019    CA 9.0 03/18/2019    ALB 3.6 02/20/2019    ALKPHO 88 02/13/2019    TP 7.3 02/13/2019    AST 15 02/13/2019    ALT 14 02/13/2019    PTT 32.2 04/03/2018    INR 1.4 (H) 02/14/2019    PTP 16.8 (H) 02/14/2019    T4F 1.25 01/30/2019    TSH 6.42 (H)

## 2019-03-18 NOTE — HISTORICAL OFFICE NOTE
Tree Asaf  : 1945  ACCOUNT:  [id number]  [phone]  PCP: Dr. Suzy Villegas     TODAY'S DATE: 2018  DICTATED BY:  [Dr. Hans Uribe      HPI:    [On 2018, Melanie Brooks, a 68year old female, presented with no interim cardiac c below    VITAL SIGNS: [B/P - 110/60 , Pulse - 70, Weight -  200, Height -   61 , BMI - 37.8 ]    CONS: heavy set. WEIGHT: Discussed and diet, exercise program prescribed. HEAD/FACE: no trauma and normocephalic.  EYES: conjunctivae not injected and no xanthe weeks  BMP both were visit to heart failure clinic in 1 week  Avoid added salt   take metolazone twice a week as needed  Look into support stockings  Keep feet elevated when sitting and try walking]    FOLLOWUP: [Return visit in 4 Weeks]    PRESCRIPTIONS: improved. EYES: denies significant visual changes. ENMT: feels like she is talking into a tunnel/echo in her ears. CV: Denies chest pain, dizziness, palpitations. RESP: dyspnea on exertion. GI: denies melena, hematochezia. : no hematuria.  INTEG: ICD pock rate controlled but no longer anticoagulated due to recurrent severe GI bleeding and severe drop in hemoglobin very symptomatic with unknown source is an ideal candidate for watchman or ambulate to preclude the need for lifelong anticoagulation to achieve

## 2019-03-19 NOTE — WOUND PROGRESS NOTE
Pt was seen for wound consult for left leg. The pt was laying in bed, no c/o pain. Pt is known to wound services for previous admit. Pt has no wounds on lower extremities, dry, red skin. Lotion applied, offered to measure for tubigrip and pt declined.  I as

## 2019-03-19 NOTE — PROGRESS NOTES
[facility]  Nephrology Daily Progress Note    Iesha Monique  [id number]  68year old      HPI:   Iesha Monique is a 68year old female. Still c/o pruritis. Mild SOB.        ROS:     Constitutional:  Negative for decreased activity, fever, i 03/19/2019     03/19/2019    K 4.5 03/19/2019    CL 98 03/19/2019    CO2 20.0 03/19/2019    GLU 61 03/19/2019    CA 9.1 03/19/2019    ALB 3.3 03/19/2019    ALKPHO 77 03/19/2019    BILT 0.7 03/19/2019    TP 7.0 03/19/2019    AST 11 03/19/2019    ALT 1 Units, 5,000 Units, Subcutaneous, 2 times per day  •  acetaminophen (TYLENOL) tab 650 mg, 650 mg, Oral, Q6H PRN  •  ondansetron HCl (ZOFRAN) injection 4 mg, 4 mg, Intravenous, Q6H PRN  •  acetaminophen (TYLENOL) tab 325-650 mg, 325-650 mg, Oral, Q6H PRN  • Hyperlipidemia     Pseudophakia of both eyes     ARMD (age-related macular degeneration), bilateral     Rib pain on right side     Cardiac defibrillator in place     BMI 39.0-39.9,adult     Severe obesity (BMI 35.0-39. 9) with comorbidity ([address]     Right fo

## 2019-03-19 NOTE — CM/SW NOTE
Patient is from [facility], she goes to [address] [facility] For HD. Patient can return when stable, confirmed with Jennifer Barone liaison.     Violeta Whitehead, [address]

## 2019-03-19 NOTE — CONSULTS
[facility]    PATIENT'S NAME: Janie Erazo   ATTENDING PHYSICIAN: Meghan Bishop MD   CONSULTING PHYSICIAN: Deana Burt MD   PATIENT ACCOUNT#:   [id number]    LOCATION:  [address] [place] [address] #:   [id number]       DATE OF BIRTH: Room.  Here her blood pressures have been running low with systolics usually in the 18V and 90s. Her heart rates have been in the 130 range. PAST MEDICAL HISTORY:  As stated above.   Again, her most recent echocardiogram done on 01/31/2019 showed a le bruits. EXTREMITIES:  Revealed a good 2 to 3+ lower extremity edema with bilateral lower extremity cellulitis involving both calves. Areas of her skin where she had significant pruritus did not show any obvious rash or urticaria.     LABORATORY DATA:  Her [facility] [id number]  [facility]/

## 2019-03-19 NOTE — CM/SW NOTE
Care Management/BPCI:    Met with patient at bedside to explain the BPCI/Medicare program. Patient agreed with phone f/u for 3 months from [facility] after discharge from [facility]. Patient was enrolled under  .  BPCI/Medicare Letter and [place]

## 2019-03-19 NOTE — PROGRESS NOTES
Pt bladder scanned per order from Dr. Celia Coker. Bladder scan showing > 600 ml. Pt straight cath'ed per protocol order. Pt refusing serna catheter. Straight cath output 150 ml of stephanie, odorous, concentrated urine.  Bladder scan repeated after straight cath a

## 2019-03-19 NOTE — PLAN OF CARE
Problem: Patient/Family Goals  Goal: Patient/Family Long Term Goal  Patient's Long Term Goal: Stop coming to the hospital    Interventions:  - Maintain compliancy with medical recommdations  - Early mobility and ambulation when possible   Outcome: Not Prog retention  Outcome: Progressing  2 L nc, lung sounds diminished.      Problem: SKIN/TISSUE INTEGRITY - ADULT  Goal: Incision(s), wounds(s) or drain site(s) healing without S/S of infection  INTERVENTIONS:  - Assess and document risk factors for pressure ulc appropriate resources  INTERVENTIONS:  - Identify barriers to discharge w/pt and caregiver  - Include patient/family/discharge partner in discharge planning  - Arrange for needed discharge resources and transportation as appropriate  - Identify discharge l

## 2019-03-19 NOTE — PROGRESS NOTES
[facility]    Progress Note    Rosa Elena Hillman Patient Status:  Inpatient    1945 MRN [id number]   Location [facility] 2W/SW Attending Ortega Simons MD   Hosp Day # 1 PCP Deanna Birch MD         Assessment and Plan: Insulin Aspart Pen  1-7 Units Subcutaneous TID CC   • Heparin Sodium (Porcine)  5,000 Units Subcutaneous 2 times per day   • allopurinol  100 mg Oral Daily   • bumetanide  0.5 mg Oral Daily   • hydrALAzine HCl  5 mg Oral BID   • isosorbide dinitrate  5 mg signed on 03/18/2019 at 14:23 by Fiorella Stoll MD  3/19/2019

## 2019-03-19 NOTE — CONSULTS
Iesha Monique  7/13/1945    ADVANCED HF CONSULT NOTE     Reason for consult: CHF       HPI:    51-year-old female with history of ICM, EF 35-40%, severe TR, end-stage renal disease on chronic hemodialysis; ICD; history of atrial fibrillation, status post emphysema ([facility] [address]    • Retinal tear     OD         Family History   Problem Relation Age of Onset   • Stroke Father    • Diabetes Mother    • Stroke Mother         CVA   • Macular degeneration Neg    • Glaucoma Neg          Social History    Socioeconomic Hi Weight Concern: Not Asked        Special Diet: Not Asked        Back Care: Not Asked        Exercise: Not Asked        Bike Helmet: Not Asked        Seat Belt: Not Asked        Self-Exams: Not Asked    Social History Narrative      Not on file .0 03/19/2019    CREATSERUM 5.72 03/19/2019    BUN 52 03/19/2019     03/19/2019    K 4.5 03/19/2019    CL 98 03/19/2019    CO2 20.0 03/19/2019    GLU 61 03/19/2019    CA 9.1 03/19/2019    ALB 3.3 03/19/2019    ALKPHO 77 03/19/2019    BILT 0.7 Allergic reaction, initial encounter        Impression  68year old      1. Acute on chronic HFrEF (EF 25-30%) s/p BIV- ICD; ICM history of CAD s/p CABG. 2. NYHA Class III, ACC/AHA Stage C.  3. Severe TR / RV dysfunction   4. ESRD  5.  AF RVR s/p Watchman

## 2019-03-19 NOTE — PROGRESS NOTES
[facility] HOSP - [facility]    Progress Note    Brittanie Gates Patient Status:  Inpatient    1945 MRN [id number]   Location [facility] 2W/SW Attending Dalia Dumont MD   Hosp Day # 1 PCP Jeet Monterroso MD       Subjective:   Ector Nolasco Min PRN  •  Insulin Aspart Pen (NOVOLOG) 100 UNIT/ML flexpen 1-7 Units, 1-7 Units, Subcutaneous, TID CC  •  Normal Saline Flush 0.9 % injection 3 mL, 3 mL, Intravenous, PRN  •  Heparin Sodium (Porcine) 5000 UNIT/ML injection 5,000 Units, 5,000 Units, Subcu clindamycin     5. DMII  This AM hypoglycemic  Will decrease levemir  Cont CF insulin  Will adjust insulin dosing based on BS readings     6.          CAD  S/p CABG  Cont asa + statin    Other Issues  Hx of GIB due to PUD  AOCKD  HTN - hypotensive th

## 2019-03-20 NOTE — PROGRESS NOTES
[facility] Heart Cardiology Progress Note      Iesha Monique Patient Status:  Inpatient    1945 MRN [id number]   Location [facility] 2W/SW Attending Katrin Banegas MD   Hosp Day # 2 PCP Davis Pal MD normal rate and reg rhythm, Nl S1,S2 ,no S3 or murmur  Abd: Abdomen soft, nontender, nondistended, no organomegaly, bowel sounds present  Ext:  no clubbing, no cyanosis, bilat LE edema up to thighs  Neuro: no focal deficits  Skin: no rashes or lesions    S

## 2019-03-20 NOTE — PROGRESS NOTES
[facility]  Nephrology Daily Progress Note    Elvin Cortes  [id number]  68year old      HPI:   Elvin Cortes is a 68year old female. Feels OK. Lying supine. Pruititis  better.        ROS:     Constitutional:  Negative for decreased act HCT 35.3 03/20/2019    .0 03/20/2019    CREATSERUM 5.06 03/20/2019    BUN 43 03/20/2019     03/20/2019    K 4.8 03/20/2019    CL 98 03/20/2019    CO2 23.0 03/20/2019     03/20/2019    CA 9.2 03/20/2019    ALB 3.8 03/20/2019    MG 2.3 (DEX4) oral liquid 15 g, 15 g, Oral, Q15 Min PRN  •  Insulin Aspart Pen (NOVOLOG) 100 UNIT/ML flexpen 1-7 Units, 1-7 Units, Subcutaneous, TID CC  •  Normal Saline Flush 0.9 % injection 3 mL, 3 mL, Intravenous, PRN  •  Heparin Sodium (Porcine) 5000 UNIT/ML hypertension     Hyperlipidemia     Pseudophakia of both eyes     ARMD (age-related macular degeneration), bilateral     Rib pain on right side     Cardiac defibrillator in place     BMI 39.0-39.9,adult     Severe obesity (BMI 35.0-39. 9) with comorbidity (

## 2019-03-20 NOTE — OCCUPATIONAL THERAPY NOTE
OCCUPATIONAL THERAPY EVALUATION - INPATIENT     Room Number: 238/238-A  Evaluation Date: 3/20/2019  Type of Evaluation: Initial       Physician Order: IP Consult to Occupational Therapy  Reason for Therapy: ADL/IADL Dysfunction and Discharge Planning    OC MEDICAL/SOCIAL HISTORY     Problem List  Principal Problem:     Allergic reaction, initial encounter  Active Problems:    Allergic reaction      Past Medical History  Past Medical History:   Diagnosis Date   • Arrhythmia     AICD/Pacer   • Atherosclerosis o Skilled nursing facility  Home Layout: One level  Lives With: Staff 24 hours            Drives: No  Patient Regularly Uses: None    Use of Assistive Device(s): RW    Prior Level of Yancey: Prior to admission, patient was at rehab following CABG.  She s/u  Bathing: mod a   Toileting: min a   Upper Extremity Dressing: min a   Lower Extremity Dressing: mod a     Education Provided: Educated patient in role of OT and POC  Patient End of Session: Needs met;Call light within reach;RN aware of session/finding

## 2019-03-20 NOTE — PROGRESS NOTES
[facility]    Progress Note    Rosa Elena Hillman Patient Status:  Inpatient    1945 MRN [id number]   Location [facility] 2W/SW Attending Ortega Simons MD   Hosp Day # 2 PCP Deanna Bicrh MD       Subjective:   Fior Erickson Q15 Min PRN  •  Insulin Aspart Pen (NOVOLOG) 100 UNIT/ML flexpen 1-7 Units, 1-7 Units, Subcutaneous, TID CC  •  Normal Saline Flush 0.9 % injection 3 mL, 3 mL, Intravenous, PRN  •  Heparin Sodium (Porcine) 5000 UNIT/ML injection 5,000 Units, 5,000 Units, S Sq  Full Code     >35 min spent    Results:     Recent Labs   Lab  03/18/19   1121  03/19/19   0427  03/20/19   0439   RBC  3.75*  3.52*  3.59*   HGB  11.1*  10.5*  10.5*   HCT  37.5  34.5*  35.3   MCV  100.0  98.0  98.3   MCH  29.6  29.8  29.2   MCHC  29.

## 2019-03-20 NOTE — PHYSICAL THERAPY NOTE
PHYSICAL THERAPY EVALUATION - INPATIENT     Room Number: 238/238-A  Evaluation Date: 3/20/2019  Type of Evaluation: Initial   Physician Order: PT Eval and Treat    Presenting Problem: allergic reaction  Reason for Therapy: Mobility Dysfunction and Dischar education;Gait training;Transfer training;Balance training;Strengthening  Rehab Potential : Good  Frequency (Obs): 3x/week       PHYSICAL THERAPY MEDICAL/SOCIAL HISTORY   Problem List  Principal Problem:     Allergic reaction, initial encounter  Active Prob CRYOTHERAPY - OD - RIGHT EYE     • WATCHMAN N/A 5/25/2018    Performed by Silvia Renae MD at [address]  Type of Home: [address] [place] [address] Street: One level  Stairs to Enter : 0  Railing: No  Stairs to Bedroom: 0  Railing: N STATUS  Gait Assessment   Gait Assistance: Contact guard assist  Distance (ft): 40ft  Assistive Device: Rolling walker  Pattern: Within Functional Limits  Stoop/Curb Assistance: Not tested       Bed Mobility: Not tested     Transfers: mod A x 1 sit to chris

## 2019-03-21 NOTE — PLAN OF CARE
Inpatient Throughput Communication:    Called inpatient RN Angelina Gallegos and notified of scheduled procedure AV node Ablation on 3/21. Verified that appropriate consent is signed: Yes  Appropriate Consent Signed:  Yes  Access Site Hair Clipped and skin prepped:

## 2019-03-21 NOTE — PROGRESS NOTES
Notified by tele that Rate is sustained in the 130s, PT is asystematic. Paged  On call at [phone] awaiting orders.     1911: Obtained verbal orders from Dr. Iris Mendieta to put patient on IV Cardizem drip per protocal.  Report given to [facility]

## 2019-03-21 NOTE — PROCEDURES
Procedures performed:  1. RFA of AV node  2. Pacer biv interrogation with reprogramming.     : Jordan Falcon MD    Indication: Chronic AF with difficult to control rates, intolerance to medicines    Complication: none  Moderate conscious sedation for

## 2019-03-21 NOTE — PROGRESS NOTES
[facility]    Progress Note    Mable Jones Patient Status:  Inpatient    1945 MRN [id number]   Location [facility] 2W/SW Attending Julieta Gonzalez MD   Hosp Day # 3 PCP Cyndi Mcpherson MD       Subjective:   Halie Gallagher PRN  •  insulin detemir (LEVEMIR) 100 UNIT/ML flextouch 20 Units, 20 Units, Subcutaneous, Nightly  •  Midodrine HCl (PROAMATINE) tab 5 mg, 5 mg, Oral, TID  •  digoxin (LANOXIN) tab 125 mcg, 125 mcg, Oral, Q Tu, Th and Sa  •  clindamycin in D5W (CLEOCIN) pr AICD  Cardiology consulted  Cont PO diuretics  Strict IsOs  Daily weights     3.      ESRD  Nephrology following   rec'd HD on 3/19  Lytes stable  BP improved    4.         B/L LE cellulitis  Wound care consulted  Cont IV clindamycin for now  Likely switch

## 2019-03-21 NOTE — PLAN OF CARE
CARDIOVASCULAR - ADULT    • Maintains optimal cardiac output and hemodynamic stability Not Progressing    • Absence of cardiac arrhythmias or at baseline Not Progressing          DISCHARGE PLANNING    • Discharge to home or other facility with appropriate

## 2019-03-22 NOTE — CM/SW NOTE
03/22/19 1200   Discharge disposition   Expected discharge disposition Skilled Nurs   Name of [address]   Patient is Discharged to a [address] Yes   Patient Refuses Rehab Services Yes   Discharge transportati

## 2019-03-22 NOTE — PROGRESS NOTES
[facility]    Progress Note    Tomasa Reaves Patient Status:  Inpatient    1945 MRN [id number]   Location [facility] 3W/SW Attending Dusty Telles MD   Hosp Day # 4 PCP Kendall Luong MD       Subjective:   Vivian Mueller deformities  Extremities: 2+ edema  bilat legs and cellulitis  Neurological:  Grossly normal    Results:     Laboratory Data:  Lab Results   Component Value Date    WBC 8.4 03/21/2019    HGB 10.7 (L) 03/21/2019    HCT 35.9 03/21/2019    .0 (L) 03/21

## 2019-03-22 NOTE — DISCHARGE SUMMARY
[facility]    Discharge Summary    Royer Atkins Patient Status:  Inpatient    1945 MRN [id number]   Location [facility] 3W/ Attending Rosie Nielson MD   Hosp Day # 4 PCP Nick Poole MD     Date of Admission: 3/ Anicteric sclerae. NECK:  Supple.  No lymphadenopathy. Mild jugular venous distention. LUNGS:  Clear to auscultation bilaterally.  Normal respiratory effort.  No intercostal retractions. HEART:  Irregularly irregular rhythm. Tachycardic.  S1 and S2 ausc AICD  Cardiology was consulted  Cont PO diuretics  Stable for dc     3.      ESRD  Nephrology was consulted  Lytes stable  BP improved  Cont HD per schedule     4.        B/L LE cellulitis  Wound care was consulted  rec'd IV clinda in-house  Switched to PO Tabs  Commonly known as:  ALLEGRA      Take 60 mg by mouth as needed. Refills:  0     Glucose 4-6 GM-MG Chew      Chew 1-2 tablets by mouth.  Blood glucose below 75   Refills:  0     Glucose Blood Strp  Commonly known as:  CONTOUR NEXT TEST      3 each by ZOCOR      TAKE 1 TABLET BY MOUTH EVERY EVENING   Quantity:  90 tablet  Refills:  0     triamcinolone acetonide 0.1 % Crea  Commonly known as:  KENALOG      APPLY EXTERNALLY TO THE AFFECTED AREA TWICE DAILY AS NEEDED   Quantity:  45 g  Refills:  0     Vit

## 2019-03-22 NOTE — PROGRESS NOTES
[facility]    Progress Note    Jake Cooney Patient Status:  Inpatient    1945 MRN [id number]   Location [facility] 3W/SW Attending Christos Crenshaw MD   Hosp Day # 4 PCP Ariella Nair MD         Assessment and Plan: clindamycin  600 mg Intravenous Q8H   • Insulin Aspart Pen  1-7 Units Subcutaneous TID CC   • Heparin Sodium (Porcine)  5,000 Units Subcutaneous 2 times per day   • allopurinol  100 mg Oral Daily   • Montelukast Sodium  10 mg Oral Nightly   • Pantoprazole

## 2019-03-22 NOTE — CARDIAC REHAB
CARDIAC REHAB HEART FAILURE EDUCATION    Handouts provided and reviewed: CHF Booklet. Activity: Chair for all meals: Yes      Disease Process: Disease process reviewed.     Reviewed the following: DAILY WEIGHT MONITORING: Reviewed      SODIUM RESTRICTIO

## 2019-03-23 NOTE — PROGRESS NOTES
[facility]    Progress Note    Tomasa Reaves Patient Status:  Inpatient    1945 MRN [id number]   Location [facility] 3W/SW Attending No att. providers found   Hosp Day # 4 PCP Kendall Luong MD       Subjective:   Josemanuel Trevino noted  Back/Spine: no abnormalities noted  Musculoskeletal: full ROM all extremities good strength  no deformities  Extremities:cellulitis resolving  2+ edema  Neurological:  Grossly normal    Results:     Laboratory Data:  Lab Results   Component Value Da

## 2019-03-29 NOTE — PATIENT INSTRUCTIONS
Assessment:   HFrEF  - Prolonged hospitalization for decompensated heart failure requiring inotropes. Readmitted again for afib with rvr, cellulitis and ESRD. Diuretics stopped at discharge  - Lower extremity edema remains significant.    - Strict low sodiu

## 2019-03-29 NOTE — PROGRESS NOTES
[facility] Patient Status:  Outpatient    1945 MRN [id number]   Location MD Dr Phyllis Ortez [facility]  Dr Carmelita Messer is a 68year old female who presents to clin Component Value Date/Time    WBC 7.6 03/22/2019 06:05 AM    HGB 10.3 (L) 03/22/2019 06:05 AM    HCT 34.8 (L) 03/22/2019 06:05 AM    .0 (L) 03/22/2019 06:05 AM    CREATSERUM 4.92 (H) 03/22/2019 06:05 AM    BUN 39 (H) 03/22/2019 06:05 AM     ( regurgitation. 3. Left atrium: The atrium was severely dilated. 4. Right ventricle: The cavity size was dilated. 5. Right atrium: The atrium was dilated. 6. Tricuspid valve: Severe regurgitation.     RHC: 2/1/2019 while on inotropes  - Mild post-capilla hemphill, pepperoni, soy sauce, pre-packaged rice or potatoes. Please remember to read nutrition labels for sodium content. No SOUP! Schedule follow up with Dr Shahrzad Zaldivar once discharged from rehab.      Return to clinic as needed or as directed          I spen

## 2019-04-10 NOTE — TELEPHONE ENCOUNTER
Pt states that she is in rehab at [facility] and needs a signature on a form stating that she needs transportation back and forth to dialysis. Pt states that she sees [facility] at dialysis and Bao Lynne at [address]al has the form.   Pt states she has tried to

## 2019-04-10 NOTE — TELEPHONE ENCOUNTER
Attempted to call pt at home number and mobile number. No answer or option to leave voicemail. Will attempt later.

## 2019-04-13 NOTE — TELEPHONE ENCOUNTER
DC summary:  Note transcribed by Dr. Lux Bella    Date seen: 4/11/19    Subjective: Patient seen and examined for rash, hemodialysis, end-stage renal disease, lower extreme a cellulitis, right wrist pain, discharge planning.   Patient seems to be stable, physical therapy evaluation    Assessment and plan: We have a 72-year-old female status post admission for CHF with renal failure now admitted for rehab.   Allergic reaction, exanthem: Likely allergic in nature, could be a medication from a few weeks ago velasco Back pain: Okay for tramadol, okay for Norco for more severe pain, Tylenol as needed    CODE STATUS: Full code    DC summary: Patient had a mildly eventful stay at [place] in which she required a readmission for cellulitis resistant to IV antibiotics in

## 2019-04-15 NOTE — TELEPHONE ENCOUNTER
[id number] would like to know if  will sign home health order and allow nurse monitor oxygen SATs at home     Please advise

## 2019-04-15 NOTE — PROGRESS NOTES
[facility] [phone] for post hospital follow up, [facility] contact information provided.

## 2019-04-16 NOTE — PROGRESS NOTES
Initial Post Discharge Follow Up   Discharge Date from SNF: 4/13/19  Contact Date: 4/15/2019    Consent Verification:  Assessment Completed With: Patient  HIPAA Verified?   Yes    Discharge Dx:   S/p CHAPINCITO for CHF with renal failure      General:   • How ha

## 2019-04-18 NOTE — TELEPHONE ENCOUNTER
[facility] from [facility] is  requesting wound care orders. Pt has a skin tear or open blister, asking to use foam dressing. Thank you.

## 2019-04-19 NOTE — TELEPHONE ENCOUNTER
Mine Matthews from [facility] contacted and informed of JSK message, voiced understanding.

## 2019-05-03 NOTE — TELEPHONE ENCOUNTER
Ghada Crooks occupational Therapist from [facility] is calling  would like to know if its ok if pt takes ointment that was prescribed by Dr. Bhavya Almanza. Ointment Triamcinolone acetonide cream 1%. Apply to rash on chest and back.

## 2019-05-04 NOTE — TELEPHONE ENCOUNTER
Pt was left a detailed message as requested. Pt was also informed if not better to f/u or if getting worse to go to ER.   Medication can cause drowsiness so she should be careful taking it

## 2019-05-04 NOTE — TELEPHONE ENCOUNTER
Action Requested: Summary for Provider     []  Critical Lab, Recommendations Needed  [] Need Additional Advice  []   FYI    []   Need Orders  [x] Need Medications Sent to Pharmacy  []  Other     SUMMARY: Cristel Rivas pt stated that yesterday she was trying t

## 2019-05-09 NOTE — TELEPHONE ENCOUNTER
Pt states contacted Dr Chauhan Neelyton office on 5/4/19 with lower back pain. Pt was given a muscle relaxant. Per pt meds are not helping and pain now radiating to right leg. Pt requesting to see PCP, informed PCP not available.   Pt was offered to schedule

## 2019-05-09 NOTE — TELEPHONE ENCOUNTER
Pt calling back and states has canceled her dialysis but has not transportation to clinic and pt asking if triage RN can provide transportation.   Offered to call 911 and pt states \"will I get a bill from the ambulance\"   Informed pt uncertain if pt would

## 2019-05-13 NOTE — TELEPHONE ENCOUNTER
Marv Hatfield [facility] req verbal order to re-schedule missed PT eval appt on 5/10 to week of 5/13 per Pt request.

## 2019-05-15 NOTE — TELEPHONE ENCOUNTER
Rena Norton from [facility] would like verbal to except orders from [facility] [place] to give medication.  Pt prescribed norco 7.5-325MG Please advise

## 2019-05-15 NOTE — TELEPHONE ENCOUNTER
Called for 1 year post Watchman f/u call. TAking ASA 81mg. Denies any significant bleeding/neurological problems.   Encouraged to call with questions/concerns

## 2019-05-16 NOTE — TELEPHONE ENCOUNTER
Left message for [facility] to call office back, please transfer call to nurse ext. [phone].

## 2019-05-16 NOTE — TELEPHONE ENCOUNTER
I am not sure what the diagnosis for this patient is to necessitate the use of Norco.  Please let us know what reason for use of a narcotic is in this patient.

## 2019-05-17 NOTE — TELEPHONE ENCOUNTER
Lauri Carrera from [address]    She stated she is trying to see if PCP can recommend or refer Pt to home Physician  Because she can not get out home     Pt has stairs and can't leave home    Please advise.   Lauri Carrera said can leave a detial message if sh

## 2019-05-17 NOTE — TELEPHONE ENCOUNTER
Chris Alston from [facility] returned call. Stts that pt had a fall last week and was seen in Immediate care. She was dx with a compression fracture cephalad end plates of Y1-G0. They are unsure if these were new or old findings.   Dr. Vanita Philip from the immediate ca

## 2019-05-17 NOTE — TELEPHONE ENCOUNTER
Valaire Harris from [facility] requesting for verbal order to Extend OT twice a week for 4 weeks.

## 2019-05-18 NOTE — TELEPHONE ENCOUNTER
I do not have the name of any specific home visit based Dr.  Does the home health nurse have any suggestions?

## 2019-05-20 NOTE — TELEPHONE ENCOUNTER
Left message for Valarie Harris to call office back please transfer call to nurse when she calls back.

## 2019-05-20 NOTE — TELEPHONE ENCOUNTER
Returned call to [facility], advised of notes per Dr Will Osorio, advised may have to call Insurance and see if they cover any [facility] agency that offers physician services in home. Karissa Fernandez verbalized understanding and agreed.

## 2019-05-20 NOTE — TELEPHONE ENCOUNTER
Left message for [facility] to call office back, please transfer call to ext. [phone] when she calls back.

## 2019-05-20 NOTE — TELEPHONE ENCOUNTER
Aki Roger called in from [facility] per [facility] for 4 more weeks inlcuding this week.

## 2019-05-21 NOTE — TELEPHONE ENCOUNTER
Lisset Naik from [facility] informed of Dr Chaves Cashing orders for [employer].

## 2019-05-28 NOTE — TELEPHONE ENCOUNTER
Action Requested: Summary for Provider     []  Critical Lab, Recommendations Needed  [] Need Additional Advice  []   FYI    []   Need Orders  [] Need Medications Sent to Pharmacy  []  Other     SUMMARY:   Please advise when we can add on.    The patient onl recurrent, or chronic?: new(started in May)             Reason for Disposition  • SEVERE back pain (e.g., excruciating, unable to do any normal activities) and not improved after pain medicine and CARE ADVICE    Protocols used: BACK PAIN-A-OH

## 2019-05-28 NOTE — TELEPHONE ENCOUNTER
Advised patient of Dr Anne-Marie lockett. Patient verbalized understanding and had no further questions. Called [facility] to verify receipt of the order. They didn't have anything so I gave a telephone order to the pharmacist, Liyah Cloud.

## 2019-05-28 NOTE — TELEPHONE ENCOUNTER
Spoke with patient ( verified) and relayed JSK message below--patient verbalizes understanding and agreement--appt scheduled for 19 at 0930.     Patient states she took her last Norco /325 mg tab this morning (Rx for #15 given to patient at 19

## 2019-05-28 NOTE — TELEPHONE ENCOUNTER
Patient calling for pain medication other than Norco because it didn't help . She stated she used to take tramadol, but was taken off that. Also asking if she should wear a brace for her hip. Advised to rest and apply ice to the hip fracture.       Please a

## 2019-05-31 NOTE — TELEPHONE ENCOUNTER
[facility] and PT on [facility] to after Pt is seen by Dr. Anne Norman per Pt's req.

## 2019-06-03 NOTE — TELEPHONE ENCOUNTER
C/o hypoglycemia. BG BF yesterday 45. States typically ranges 60-70 before breakfast. Sat BG before dinner 50. Pt states she has been sick x4mos and not eating regularly or very much at all. Pt notices when she does not eat her BG are very low.     This mor

## 2019-06-03 NOTE — TELEPHONE ENCOUNTER
FYI-Pt states she is unable to see AM at this time due to transportation. Pt states she appreciates AM coming to see her while she was in the hospital. Pt states she would like to speak with AM regarding insulin.  Pt states she lost 50 pounds and inquiring

## 2019-06-04 NOTE — TELEPHONE ENCOUNTER
Agree with decreasing dose to half 20 am and 10 pm  Call with Bg Friday. / sooner if under 70  Please discuss importance of monitoring and FU  Please arrange for her to come and see me at some point in the next two months ( LOV 7/2019), can book base dno p

## 2019-06-06 NOTE — PROGRESS NOTES
HPI:    Patient ID: Jason Karimi is a 68year old female. HPI  Patient is here for follow-up on chronic medical issues. I have not seen her in a while. She was seeing the physician's assistant as well.   She had a prolonged hospitalization from [facility] side     Cardiac defibrillator in place     BMI 39.0-39.9,adult     Severe obesity (BMI 35.0-39. 9) with comorbidity ([facility]     Right foot pain     Acute gout due to renal impairment involving left ankle     Primary osteoarthritis involving multiple joints Performed by Balta Parnell MD at [address] [place] [address] ENDOSCOPY   • ESOPHAGOGASTRODUODENOSCOPY (EGD) N/A 4/4/2018    Performed by Balta Parnell MD at [address] [place] [address] ENDOSCOPY   • ESOPHAGOGASTRODUODENOSCOPY (EGD) N/A 4/3/2018    Performed by Shania Buck if needed Disp: 50 tablet Rfl: 1   Midodrine HCl 5 MG Oral Tab Take 1 tablet (5 mg total) by mouth 3 (three) times daily. Disp: 90 tablet Rfl: 0   digoxin 0.125 MG Oral Tab Take 1 tablet (125 mcg total) by mouth Every Tuesday, Thursday, and Saturday.  Disp: Disp: 45 g Rfl: 0   Insulin Pen Needle (PEN NEEDLES) 31G X 8 MM Does not apply Misc 1 each by Does not apply route 2 (two) times daily.  Disp: 200 each Rfl: 1   Lancet Device Does not apply Misc Use as directed 3 times a day Disp: 300 each Rfl: 3   silver s INTERNAL    2. ESRD on hemodialysis ([facility] [address]  Continue with 3 times a week dialysis. Patient is lost considerable weight presumed due to fluid. They are monitoring blood work there. 3. Primary cardiomyopathy (HCC)  No evidence of volume overload.   Ang

## 2019-06-06 NOTE — PROGRESS NOTES
Jason Karimi is a 68year old female.  Patient presents with:  Sinus Problem: nasal congestion and post nasal drip     HPI: 8/20/18 Dr Ines Duarte visit   For the last 2-3 months she has been experiencing problems with facial pressure and congestion pressure in KWIKPEN) (70-30) 100 UNIT/ML Subcutaneous Suspension Pen-injector Inject 40 Units into the skin 2 (two) times daily. 7am and 6pm Disp: 30 mL Rfl: 0   allopurinol 100 MG Oral Tab Take 1 tablet (100 mg total) by mouth daily.  Disp: 30 tablet Rfl: 5   SEAN CO Gout    • H/O echocardiogram 2011    LVEF 15%   • High blood pressure    • High cholesterol    • MGD (meibomian gland dysfunction) 2012    OU   • No diabetic retinopathy OU [id number]    OU   • Obesity    • Osteoarthritis    • Pulmonary emphysema ([facility] [address]    • Left: Normal.   Ear canals and tympanic membranes but tympanosclerosis bilaterally     ASSESSMENT AND PLAN:   1.   Chronic rhinosinusitis  She has a chronic dry nose with history of bleeding I think just from the extreme dryness of the nasal cavities I expl

## 2019-06-14 NOTE — PROGRESS NOTES
Patient awake and alert, no sedation, no local, vials stable instructions given, stated understanding

## 2019-06-15 NOTE — TELEPHONE ENCOUNTER
Controlled medications pending for review. If approved needs to be called in or faxed by on site staff.     Last Rx:5-28-19 # 50 + 1  LOV: 6-6-19    Requested Prescriptions     Pending Prescriptions Disp Refills   • TRAMADOL HCL 50 MG Oral Tab [Pharmacy Med

## 2019-06-20 NOTE — TELEPHONE ENCOUNTER
Please note Dr Paresh Wilburn,     Per Lisa Vazquez, RN, patient refused her RN visit yesterday. Patient will wait for RN visit Next week. Per [facility] policy, Lisa Vazquez Rn called to inform you. FYI.

## 2019-06-21 NOTE — TELEPHONE ENCOUNTER
Called and spoke with pt whom states she is back to 20 am and 20 pm currently. She lost 50 lbs recently, had no appetitie and that is why she was running low in the middle of the night. She reports her last couple , 120, 170, 145.   Offered to luis

## 2019-06-26 NOTE — TELEPHONE ENCOUNTER
RN advised patient to contact Dr. Juan Carver first for instructions for DM meds for Dialysis Shunt placement 6/28 per TE dtd 6/15/19. Pt verbalized understanding.      \"Patient states that she prefers to follow up PCP for further care so that she can have one

## 2019-06-26 NOTE — TELEPHONE ENCOUNTER
Pt is having surgery on 6/28 for shunt placement - asking if there is something different she needs to do with her insulin that day

## 2019-06-27 NOTE — TELEPHONE ENCOUNTER
Ok, noted. Reviewed chart and Dr. Sy Head recommended follow up appointment but patient has refused since she wants to see Dr. Ramon Teran.

## 2019-06-28 NOTE — ANESTHESIA PREPROCEDURE EVALUATION
Anesthesia PreOp Note    HPI:     Hugh Tejada is a 68year old female who presents for preoperative consultation requested by: Ann Bar MD    Date of Surgery: 6/28/2019    Procedure(s):  A.V. FISTULA  Indication: end stage renal disease    Releva bilateral         Date Noted: 10/05/2016      Essential hypertension         Date Noted: 06/02/2014      Hyperlipidemia         Date Noted: 12/19/2011      Coronary atherosclerosis         Date Noted: 04/27/2011      Type II diabetes mellitus ([facility] Leonid    • COLONOSCOPY N/A 4/4/2018    Performed by Nisa Fletcher MD at [address] [place] [address] ENDOSCOPY   • ESOPHAGOGASTRODUODENOSCOPY (EGD) N/A 4/4/2018    Performed by Nisa Fletcher MD at [address] [place] [address] ENDOSCOPY   • ESOPHAGOGASTRODUODENOSCOPY (EGD) N/A Pantoprazole Sodium 40 MG Oral Tab EC Take 1 tablet (40 mg total) by mouth 2 (two) times daily before meals. Disp: 60 tablet Rfl: 2 6/27/2019 at 900   Vitamin C 500 MG Oral Tab Take 500 mg by mouth daily.    Disp:  Rfl:  6/27/2019 at 900   SIMVASTATIN 40 Comment:Rash/Itching following surgical prep  Iodine  [Gnp Iodine]    UNKNOWN  Radiology Contrast *    RASH  Vancomycin              RASH    Family History   Problem Relation Age of Onset   • Stroke Father    • Diabetes Mother    • Stroke Mother         CV coffee, 3cups/day        Occupational Exposure: Not Asked        Hobby Hazards: Not Asked        Sleep Concern: Not Asked        Stress Concern: Not Asked        Weight Concern: Not Asked        Special Diet: Not Asked        Back Care: Not Asked        Ex (+) diabetes mellitus, arthritis  Abdominal   (+) obese,              Anesthesia Plan:   ASA:  4  Informed Consent Plan and Risks Discussed With:  Patient  Discussed plan with:  CRNA      I have informed Kellee Public and/or legal guardian or family mem

## 2019-06-28 NOTE — H&P
[facility], [address] [facility]  Wound Care Clinic     VASCULAR SURGERY    NOTE        Name: Brandee Pugh   :   1945  [id number]      REFERRING PHYSICIAN:  Mena Self Referred  PRIMARY CARE 2012     replacement of pacemaker and leads   • CATARACT EXTRACTION W/  INTRAOCULAR LENS IMPLANT Right 5/14/12     Dr. Mika Brown at [facility] Left 6/4/12     Dr. Mika Brown at [address] [place] [address] N/A 4/4/ External Cream, Apply topically daily. , Disp: , Rfl:   •  Insulin NPH & Regular (HUMULIN 70/30 KWIKPEN) (70-30) 100 UNIT/ML Subcutaneous Suspension Pen-injector, Inject 40 Units into the skin 2 (two) times daily.  7am and 6pm, Disp: 30 mL, Rfl: 0  •  allopu drugs.     FAMILY HISTORY:    Patient's family history includes Diabetes in her mother; Stroke in her father and mother.     ROS:     A 12 point review of systems with pertinent positives and negatives listed in the HPI.     EXAM:     Resp 16   Ht 5' 1\" (

## 2019-06-28 NOTE — OPERATIVE REPORT
[facility]     PATIENTS NAME: Reilly Boogie  ATTENDING PHYSICIAN: Perla Robert MD  OPERATING PHYSICIAN: Franky Prasad MD  CSN: [id number]     LOCATION:  OR  MRN: [id number]    YOB: 1945  ADMISSION DATE: 6/28/2019  OPERATION DATE: ligated, and divided. The overlying nerve branches were preserved. The basilic vein was dissected free from the antecubital fossa to the axilla. Care was taken to avoid injury to the nerve.  Attention was then directed to the brachial artery in the antecub reapproximated using a running 3-0 Vicryl sutures. The skin was closed with 4-0 Vicryl. The incision area was injected with a 0.5% Marcaine solution, then Steri-strips were applied followed by a dry sterile dressing.  The patient tolerated the procedure we

## 2019-06-28 NOTE — TELEPHONE ENCOUNTER
I typically have patients half the dose of insulin 70/30 the night before procedure and hold it in morning of procedure. However, since she is following with Dr. Sarah Mo now I recommend she call his office for specific instructions.   Thanks

## 2019-06-28 NOTE — ANESTHESIA PROCEDURE NOTES
Peripheral Block    Anesthesiologist:  Shannan Thayer MD  Patient Location:  PACU  Start Time:  6/28/2019 1:14 PM  End Time:  6/28/2019 1:23 PM  Preanesthetic Checklist: 2 patient identifers, IV checked, risks and benefits discussed, monitors and equipment

## 2019-06-28 NOTE — ANESTHESIA POSTPROCEDURE EVALUATION
Patient: Tavon Baxter    Procedure Summary     Date:  06/28/19 Room / Location:  [address] [place] [address] MAIN OR 02 / [address] [facility] MAIN OR    Anesthesia Start:  9846 Anesthesia Stop:  2523    Procedure:   A.V. FISTULA (Left ) Diagnosis:  (end stage renal disease)    Surgeon:  Carmencita Edwards,

## 2019-07-01 NOTE — TELEPHONE ENCOUNTER
Please review; protocol failed.   Cholesterol Medications  Protocol Criteria:  · Appointment scheduled in the past 12 months or in the next 3 months  · ALT & LDL on file in the past 12 months  · ALT result < 80  · LDL result <130   Recent Outpatient Visits Med   04/30/18 Office Visit Meagan Olmedo [facility]   04/17/18 Office Visit Lynn Melendez PA-C [facility]   01/22/18 Office Visit Lynn Melendez PA-C [facility]   Showing recent visits within past 540 days with a meds aut

## 2019-07-03 NOTE — TELEPHONE ENCOUNTER
Rx needs to be faxed if approved.   Last refilled on 6/17/19 #50 0RF    ·   Recent Outpatient Visits            3 weeks ago Epistaxis    [facility] for Roxanna Holm MD    Office Visit    3 weeks ago Compression fr

## 2019-07-06 NOTE — TELEPHONE ENCOUNTER
LD Xavier    [email or web address]    Message # [phone]         06/28/2019 06:55p   [facility]  To:  From:  BALDO Armando MD:  Phone#:  ----------------------------------------------------------------------  [phone] [employer] [facility]  [address]

## 2019-07-12 NOTE — TELEPHONE ENCOUNTER
Talked with Luz Triage nurse at [facility].  They will start on Sunday and send a faxed order for Dr. Michael Alvarez to sign on Monday.

## 2019-07-12 NOTE — TELEPHONE ENCOUNTER
[facility] to request an extension for OT 1x per week for the next 2 weeks in order to continue training r/t Home Safety and  Exercise Program to strengthen upper body.     Also requesting a VERBAL ORDER for Physical Therapy for PT patient starting

## 2019-07-15 NOTE — TELEPHONE ENCOUNTER
Rec'd phone call from [facility] GRETCHEN in the phone room stating that she has Marguerite Seo on the phone requesting verbal order for PT. Spoke to Marguerite Seo and informed her that [address] [place] [address] does approve of PT services as well from his message below. She verbalized understanding.

## 2019-07-18 PROBLEM — K42.9 UMBILICAL HERNIA: Status: ACTIVE | Noted: 2019-01-01

## 2019-07-18 PROBLEM — R21 RASH: Status: ACTIVE | Noted: 2019-01-01

## 2019-07-18 NOTE — ASSESSMENT & PLAN NOTE
Umbilical Hernia- poor surgical candidate  1) Discussed with Dr. Courtney Grant  2) Monitor   3) If patient has any pain she is to call the clinic immediately

## 2019-07-18 NOTE — PATIENT INSTRUCTIONS
Hernia (Adult)    A hernia can happen when there is a weakness or defect in the wall of the abdomen or groin. Intestines or nearby tissues may move from their usual location and push through the weakness in the wall.  This can cause a hernia (bulge) you m · Incarcerated/strangulated. The intestine is trapped (incarcerated). If this happens, you won’t be able to push the bulge back in. If the incarcerated hernia isn’t treated, it may become strangulated.  This means the area loses blood supply and the tissue · Severe pain, redness, or tenderness in the area near the hernia  · Pain worsens quickly and doesn’t get better  · Inability to have a bowel movement or pass gas  · Fever of 100.4°F (38°C) or higher, or as directed by your healthcare provider  Date Last R · Femoral. This type is in the groin, upper thigh (where the leg bends), or labia. It is more common in women. · Ventral. This type is in the abdominal wall. · Umbilical. This type occurs around the navel (belly button).   · Incisional. This type occurs a Follow up with your healthcare provider, or as directed. If imaging tests were done, they will be reviewed a doctor. You will be told the results and any new findings that may affect your care.   When to seek medical advice  Call your healthcare provider ri

## 2019-07-18 NOTE — PROGRESS NOTES
HPI:    Patient ID: Rosa Elena Hillman is a 76year old female. HPI  Patient here because she notices a lump on her abdomen. She was washing and I noticed this lump.   Patient with multiple medical problems here because when she stands up she notices a lump a Psychiatric/Behavioral: Positive for depressed mood (With frequent hospitalizations). Current Outpatient Medications:  triple antibiotic 3.5-400-5000 External Ointment Apply 1 each topically 3 (three) times daily.  Disp: 14 g Rfl: 0   DevanADo 3 each by Other route daily. Dx code E11.65 IDDM Disp: 150 each Rfl: 3   Insulin Pen Needle (TECHLITE PEN NEEDLES) 32G X 4 MM Does not apply Misc Inject 2 times a day Disp: 200 each Rfl: 0   aspirin 81 MG Oral Tab Take 81 mg by mouth daily.    Disp:  Rfl: appears depressed. ASSESSMENT/PLAN:     Problem List Items Addressed This Visit        Integumentary    Rash     Rash- Scattered small red, papule rash on back. Plan: Triamcinolone acetonide . 1% topical         Relevant Medications    triamci

## 2019-07-19 PROBLEM — M54.50 CHRONIC MIDLINE LOW BACK PAIN WITHOUT SCIATICA: Status: ACTIVE | Noted: 2019-01-01

## 2019-07-19 PROBLEM — G89.29 CHRONIC MIDLINE LOW BACK PAIN WITHOUT SCIATICA: Status: ACTIVE | Noted: 2019-01-01

## 2019-07-20 NOTE — PROGRESS NOTES
HPI:    Patient ID: Elvin Cortes is a 76year old female. HPI  Patient here because she notices a lump on  her stomach. She  was washing and she noticed this lump. She describes it as a bulge and it is protruding but is not painful.   Patient with sarwat (Fracture). Skin: Positive for rash. Allergic/Immunologic: Negative for environmental allergies. Neurological: Positive for dizziness (If she moves to fast). Psychiatric/Behavioral: Positive for depressed mood (With frequent hospitalizations). daily before meals. Disp: 60 tablet Rfl: 2   Vitamin C 500 MG Oral Tab Take 500 mg by mouth daily. Disp:  Rfl:    Glucose Blood (CONTOUR NEXT TEST) In Vitro Strip 3 each by Other route daily.  Dx code E11.65 IDDM Disp: 150 each Rfl: 3   Insulin Pen Needle deficit is present. Skin: Skin is warm and dry. Rash (Skin/poor- rash noted on back and face) noted. Psychiatric: Her behavior is normal. Judgment normal. Her mood appears depressed.               ASSESSMENT/PLAN:     Problem List Items Addressed This V

## 2019-07-21 PROBLEM — T14.8XXA MULTIPLE WOUNDS OF SKIN: Status: ACTIVE | Noted: 2019-01-01

## 2019-07-21 NOTE — PROGRESS NOTES
HPI:    Patient ID: Jose Hernadez is a 76year old female. HPI  Patient here because she notices a lump on stomach. I was washing and I noticed this lump.   Patient with multiple medical problems here because when she stands up she notices a lump around Psychiatric/Behavioral: Positive for depressed mood (With frequent hospitalizations). Current Outpatient Medications:  triple antibiotic 3.5-400-5000 External Ointment Apply 1 each topically 3 (three) times daily.  Disp: 14 g Rfl: 0   DevanADo 3 each by Other route daily. Dx code E11.65 IDDM Disp: 150 each Rfl: 3   Insulin Pen Needle (TECHLITE PEN NEEDLES) 32G X 4 MM Does not apply Misc Inject 2 times a day Disp: 200 each Rfl: 0   aspirin 81 MG Oral Tab Take 81 mg by mouth daily.    Disp:  Rfl: appears depressed.               ASSESSMENT/PLAN:     Problem List Items Addressed This Visit        Musculoskeletal    Chronic midline low back pain without sciatica - Primary     Chronic back pain- Tramadol 50mg po x2  q 6 hours  Dr. Aquilino Ward signed Nancy Garcia

## 2019-07-23 NOTE — TELEPHONE ENCOUNTER
Review pended refill request as it does not fall under a protocol.     Requested Prescriptions     Pending Prescriptions Disp Refills   • MIDODRINE HCL 5 MG Oral Tab [Pharmacy Med Name: MIDODRINE 5MG TABLETS] 90 tablet 0     Sig: TAKE 1 TABLET BY MOUTH THRE

## 2019-07-26 NOTE — PROGRESS NOTES
Elvin Cortes is a 76year old female. Patient presents with:  Shortness Of Breath: C/o SOB that started yesterday.  Stts SOB is mostly when laying down on her right side, or even when she's sitting    HPI:   Ms. Ingrid Alberto presents this afternoon, in a wheelch Suspension Pen-injector Inject 20 Units into the skin 2 (two) times daily. 7am and 6pm Disp: 45 mL Rfl: 0   Insulin Pen Needle (PEN NEEDLES) 31G X 8 MM Does not apply Misc 1 each by Does not apply route 2 (two) times daily.  Disp: 200 each Rfl: 0   Mometaso forehead 2012    excision   • Cataract 2004    OU   • CKD (chronic kidney disease) stage 3, GFR 30-59 ml/min ([facility])     dialysis tues,thurs,sat - chest catheter   • Congenital anomaly of heart    • Congestive heart disease ([facility]    • COPD (chronic obstructi at [address] History:  Social History    Tobacco Use      Smoking status: Former Smoker        Packs/day: 1.00        Years: 30.00        Pack years: 27        Quit date: 2003        Years since quittin.5      Smokeless tobacco: Never Us

## 2019-07-26 NOTE — PATIENT INSTRUCTIONS
As we discussed, I believe the cause of your difficulty breathing is fluid overload, and you need dialysis to remove the excess fluid. I would recommend that you go to the ER for evaluation.

## 2019-07-27 NOTE — TELEPHONE ENCOUNTER
Controlled medications pending for review. If approved needs to be called in or faxed by on site staff.     Last Rx: 7-18-19 # 50  LOV: 6-6-19    Requested Prescriptions     Pending Prescriptions Disp Refills   • TRAMADOL HCL 50 MG Oral Tab [Pharmacy Med Na

## 2019-07-29 NOTE — TELEPHONE ENCOUNTER
Patient is a newer dialysis patient. Yesterday they were not able to draw a lot of fluid since her bp was low. Patient has blisters on her lower left leg. One Blister has popped.      Patient takes Midodrine and dialysis asked patient if she can take more s

## 2019-07-29 NOTE — TELEPHONE ENCOUNTER
Advised patient of Dr. Anne-Marie Ventura note. Patient verbalized understanding  Per Ritesh Gaston [facility] RN patient has multiple wounds on her left leg (medial ankle, shin, calf)  that are blistered or open. Patient complains of burning of wound on ankle/calf.    Ritesh Gaston [facility]

## 2019-07-29 NOTE — TELEPHONE ENCOUNTER
Our notes show that she is taking the midodrine 5 mg times a day. Potentially it can be increased up to 10 mg 3 times a day. That increase would be based on her blood pressure and her tolerance of the medication.   I am not going to give the order to do t

## 2019-08-01 NOTE — TELEPHONE ENCOUNTER
Spoke with Rohan Elder RN from [facility], will request Wound care nurse to visit patient. Reports patient is very difficult  to see patient and have her agrees to  Treatments.     Still in need of order for re-certification  to continue both OT and Nursing visits

## 2019-08-05 NOTE — TELEPHONE ENCOUNTER
Review pended refill request as it does not fall under a protocol.     Last Rx: 7/26/2019  LOV: 7/26/19

## 2019-08-26 NOTE — H&P
[facility]  H&P    Lynsey Jacinto Patient Status:  Outpatient in a Bed    1945 MRN [id number]   Location [facility] Attending Chun Franco MD   Hosp Day # 0 PCP Marika Hendrickson MD hypertension, implantable cardioverter defibrillator ventricular tachycardia 2005 and Watchman implant 5/18    FAMILY HISTORY: Negative for premature CAD. SOCIAL HISTORY: SMOKING: Former tobacco use. used to smoke but quit 2003.  CAFFEINE: 1 1/2 cups of co atrial fibrillation  5. COPD  6. DM, Type II/Unspecified, controlled  7. History of CABGx2 3/2010  8. Hypercholesteremia, pure  9. Hypertension, benign  10. ICD reprogramming  11. ICD upgrade to biv defib 4/12  12.  Nicotine dependence, unspecified, in soni LENS IMPLANT Left 6/4/12    Dr. Christopher Elizondo at [facility]    • COLONOSCOPY N/A 4/4/2018    Performed by Iliana Barraza MD at [address] [place] [address] ENDOSCOPY   • ESOPHAGOGASTRODUODENOSCOPY (EGD) N/A 4/4/2018    Performed by Iliana Barraza MD at [address] [place] [address] ENDOSCO 93 %, not currently breastfeeding. No data recorded. Wt Readings from Last 3 Encounters:  08/23/19 : 146 lb (66.2 kg)  08/12/19 : 164 lb (74.4 kg)  07/26/19 : 164 lb (74.4 kg)        General: Alert and oriented in no apparent distress.   HEENT: No focal 02/14/2019    INR 1.4 (H) 02/01/2019    INR 1.25 (H) 05/26/2018         Daniel Marrero MD  8/26/2019  12:33 PM    Daniel Marrero MD  [facility]  Cardiac Electrophysiolgy

## 2019-08-26 NOTE — PROCEDURES
OPERATION(S) PERFORMED:   1. ICD implant. biv sjm, CanGaroo antibiotic pouch. 2. ICD generator removal. biv sjm   3. ICD generator testing and lead testing     : Jose Cruz Cruz MD  INDICATION: Device at [facility].  Cardiomyopathy/CHF  SEDATION: IV and local

## 2019-08-26 NOTE — PROGRESS NOTES
Pt is able to sit up and ambulate without any difficulty. Procedure site remains dry and intact with no signs of bleeding and hematoma. Pt tolerated food/fluid. Instructions given. Pt/Family verbalized understanding.  Rubin Aly, pt's friend is advised to call

## 2019-08-28 PROBLEM — I50.9 ACUTE ON CHRONIC CONGESTIVE HEART FAILURE, UNSPECIFIED HEART FAILURE TYPE (HCC): Status: ACTIVE | Noted: 2019-01-01

## 2019-08-28 PROBLEM — Z99.2 ESRD (END STAGE RENAL DISEASE) ON DIALYSIS (HCC): Status: ACTIVE | Noted: 2019-01-01

## 2019-08-28 PROBLEM — N18.6 ESRD (END STAGE RENAL DISEASE) ON DIALYSIS (HCC): Status: ACTIVE | Noted: 2019-01-01

## 2019-08-28 PROBLEM — E87.5 HYPERKALEMIA: Status: ACTIVE | Noted: 2019-01-01

## 2019-08-28 NOTE — ED INITIAL ASSESSMENT (HPI)
Chad Yindilip arrives via EMS from home--patient has multiple complaints today    -feels weak s/p having her pacemaker battery replaced on Monday  -hypoxia and SOB (87% on room air)  -back pain  -uncontrollable bowel movements    Patient also admits to skipping d

## 2019-08-28 NOTE — HISTORICAL OFFICE NOTE
Baltazar Hall MD - 2018 12:00 AM CDT  [employer]  : 1945  ACCOUNT: [id number]  [phone]  PCP: Dr. Cyndi Mcpherson    TODAY'S DATE: 2018  DICTATED BY: [Dr. Glenis Addison    HPI:   [On 2018, Mable Jones, a 68 year ol Oral    MEDICATIONS: Selected prescriptions see below    VITAL SIGNS: [B/P - 110/60 , Pulse - 70, Weight - 200, Height - 61 , BMI - 37.8 ]    CONS: heavy set. WEIGHT: Discussed and diet, exercise program prescribed. HEAD/FACE: no trauma and normocephalic. week and Dr. Malgorzata Jessica in 4 weeks  BMP both were visit to heart failure clinic in 1 week  Avoid added salt  take metolazone twice a week as needed  Look into support stockings  Keep feet elevated when sitting and try walking]    FOLLOWUP: [Return visit in 4

## 2019-08-28 NOTE — CM/SW NOTE
DEMETRIO spoke with the pt's dtr. Estefany Elmore via telephone. The pt.rents an apartment from her good friend Shaina Peace and Shelli Drew.  There are 5 stairs down to her apartment.   The pt. dtr Reports the pt being independent prior to admission with adls and ambulation

## 2019-08-28 NOTE — TELEPHONE ENCOUNTER
Received call from Pt's cousin/Pt, stated Pt felt very weak, had battery re[placed for pacemaker 2 days ago, fell x 2 yesterday and unable to stand this AM- advised will dispatch 911 to stay on call- Pt advised dispatcher she was not dizzyor SOB,ambulance

## 2019-08-28 NOTE — ED NOTES
Report given to [facility] RN ([id number]), pt transported to room 227 on 2L NC with monitor and ED RN. VSS stable at time of departure from ED. Pt's belongings included in transport.

## 2019-08-28 NOTE — CONSULTS
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 Chest x-ray with mild patchy air space disease on the right and chronic pulmonary interstitial disease.     Past Medical History        Past Medical History Audelia Rausch MD at [address] [place] [address] ENDOSCOPY   • ESOPHAGOGASTRODUODENOSCOPY (EGD) N/A 4/4/2018    Performed by Jason Merino MD at [address] [place] [address] ENDOSCOPY   • ESOPHAGOGASTRODUODENOSCOPY (EGD) N/A 4/3/2018    Performed by Jason Merino MD at [address] [place] [address] ENDOSCO NEEDED   triple antibiotic 3.5-400-5000 External Ointment Apply 1 each topically 3 (three) times daily. triamcinolone acetonide 0.1 % External Ointment Apply 1 Application topically 2 (two) times daily.    MONTELUKAST SODIUM 10 MG Oral Tab TAKE 1 TABLET B following surgical prep  Iodine  [Gnp Iodine]    UNKNOWN  Radiology Contrast *    RASH  Vancomycin              RASH    Review of Systems:   Recent blister on her left leg with fatigue weakness and shortness of breath otherwise  A comprehensive review was 17.0 (L) 08/28/2019    GLU 89 08/28/2019    CA 9.2 08/28/2019    ALB 3.7 03/22/2019    ALKPHO 77 03/19/2019    TP 7.0 03/19/2019    AST 11 (L) 03/19/2019    ALT 11 (L) 03/19/2019    PTT 32.2 04/03/2018    INR 1.4 (H) 02/14/2019    PTP 16.8 (H) 02/14/2019

## 2019-08-28 NOTE — H&P
[facility]    PATIENT'S NAME: Fang Day   ATTENDING PHYSICIAN: Tamara Beckwith MD   PATIENT ACCOUNT#:   [id number]    LOCATION:  [address] RECORD #:   [id number]       YOB: 1945  ADMISSION DATE:       08/28/2019 medication reconciliation list.    ALLERGIES:  BuSpar and IV contrast.    FAMILY HISTORY:  Father had cerebrovascular accident. Mother had pneumonia. SOCIAL HISTORY:  Denies tobacco, alcohol, or drug use. Currently resides in a nursing facility.   Requ Generalized osteoarthritis. NEUROLOGIC:  Motor and sensory intact. Cranial nerves II-XII are intact. ASSESSMENT:    1. Fluid overload status with hyperkalemia secondary to noncompliance with dialysis.   2.   Generalized fatigue, most likely related t

## 2019-08-28 NOTE — ED PROVIDER NOTES
Patient Seen in: [facility] AND [facility] Emergency Department    History   Patient presents with:  Dyspnea JAZLYN SOB (respiratory)    Stated Complaint: sob    HPI    35-year-old female with past medical history significant for pacemaker, CAD, end-stage renal dis tracheal deviation present. CV: s1s2+, RRR, no m/r/g, normal distal pulses  Pulmonary/Chest: Bibasilar rales with mild expiratory wheezing. No chest wall tenderness  Abdominal: Nontender. Nondistended. Soft. Bowel sounds are normal.   Back:   :    Radha Sinclair panel order CBC WITH DIFFERENTIAL WITH PLATELET.   Procedure                               Abnormality         Status                     ---------                               -----------         ------                     CBC W/ DIFFERENTIAL[[id number]] stat dialysis. Discussed with [facility] hospitalist who has accepted patient for admission.     Critical care time spent on this patient was 75 min for taking history from patient examining patient, medical decision-making, reviewing lab work and radiology

## 2019-08-29 NOTE — PROGRESS NOTES
[facility]    Progress Note    Baker Friday Patient Status:  Inpatient    1945 MRN [id number]   Location [facility] 2W/SW Attending Ward Barber MD   [address] Day # 1 PCP Mark Lawson MD         Assessment and Plan:   Acute o currently breastfeeding.   Intake/Output:        Last 24 hours:     Intake/Output Summary (Last 24 hours) at 8/29/2019 3337  Last data filed at 8/29/2019 0600  Gross per 24 hour   Intake 447 ml   Output 0 ml   Net 447 ml      This shift: No intake/output da Wilber Taylor MD on 8/28/2019 at 9:57     Approved by (CST): Zion Garduno MD on 8/28/2019 at 9:59          Ekg 12-lead    Result Date: 8/28/2019  ECG Report  Interpretation  -------------------------- Ventricular paced rhythm ABNORMAL When compared with ECG of

## 2019-08-29 NOTE — PROGRESS NOTES
[facility]    Progress Note    Monalisa Bowles Patient Status:  Inpatient    1945 MRN [id number]   Location [facility] 2W/SW Attending Meryle Blood, MD   Hosp Day # 1 PCP Bethel Tamayo MD       Subjective:   Monalisa Bowles is r Human    Results:     Lab Results   Component Value Date    WBC 9.9 08/29/2019    HGB 13.2 08/29/2019    HCT 40.3 08/29/2019    .0 (L) 08/29/2019    CREATSERUM 6.32 (H) 08/29/2019    BUN 48 (H) 08/29/2019     (L) 08/29/2019    K 5.2 (H) 08/29/ 8/28/2019  ECG Report  Interpretation  -------------------------- Ventricular paced rhythm ABNORMAL When compared with ECG of 08/28/2019 09:18:27 No significant changes have occurred Electronically signed on 08/28/2019 at 15:53 by Thurmon  12-le

## 2019-08-29 NOTE — CONSULTS
[facility]    PATIENT'S NAME: Olivia Ganser   ATTENDING PHYSICIAN: Rachel Lennon MD   CONSULTING PHYSICIAN: Beatrice Chew.  Josué Bonilla MD   PATIENT ACCOUNT#:   [id number]    LOCATION:  [address] [place] [address],[facility] Floor #:   [id number]       DATE OF BIRTH:  0 Protonix. We will address those with her tomorrow. She also takes insulin at home. Her pressure has been low, so some of these have been held. SOCIAL HISTORY:  She is not . Does not work. Does not smoke or drink.     FAMILY HISTORY:  All pare

## 2019-08-29 NOTE — PROGRESS NOTES
[facility]    Progress Note    Janett Arreola Patient Status:  Inpatient    1945 MRN [id number]   Location [facility] 2W/SW Attending Ashu Booker MD   Hosp Day # 1 PCP Nithya Cisneros MD       Subjective:   Janett Arreola is a deformities  Extremities: no edema, cyanosis  Neurological:  Grossly normal    Results:     Laboratory Data:  Lab Results   Component Value Date    WBC 9.9 08/29/2019    HGB 13.2 08/29/2019    HCT 40.3 08/29/2019    .0 (L) 08/29/2019    CREATSERUM 6

## 2019-08-29 NOTE — PLAN OF CARE
Pt a/o x 3; on o2 3L NC; HD today; bp low at times in 70-80s, albumin and normal saline bolus given; up with assist to chair; AV paced with lots of ectopy; arms/legs scabs, bruising, redness; L AVF in place, R permacath; chronic back pain, no medications g Assist with transfers and ambulation using safe patient handling equipment as needed  - Ensure adequate protection for wounds/incisions during mobilization  - Obtain PT/OT consults as needed  - Advance activity as appropriate  - Communicate ordered activit SKIN/TISSUE INTEGRITY - ADULT  Goal: Skin integrity remains intact  Description  INTERVENTIONS  - Assess and document risk factors for pressure ulcer development  - Assess and document skin integrity  - Monitor for areas of redness and/or skin breakdown  -

## 2019-08-29 NOTE — DIETARY NOTE
ADULT NUTRITION INITIAL ASSESSMENT    Pt is at high nutrition risk. Pt meets severe malnutrition criteria.       CRITERIA FOR MALNUTRITION DIAGNOSIS:  Criteria for severe malnutrition diagnosis: chronic illness related to wt loss greater than 20% in 1 year nutritional supplements) and Ordered daily renal vitamin.   - Meals and snacks: Encouraged adequate PO intake  - Medical Food Supplements-RD added Nepro BID. Rational/use of oral supplements discussed. - Vitamin and mineral supplements: vitamin c PTA.    - kg/m².   BMI CLASSIFICATION: 25-29.9 kg/m2 - overweight  IBW: 105 lbs        144 % IBW  Usual Body Wt: 207  Lbs (estimate 187 lbs after fluid removed first few weeks dialysis starting in April)       81 % UBW (187 lbs to 151 lbs now)    WEIGHT HISTORY:  Michel Farias FINDINGS:  - Body Fat/Muscle Mass: mild depletion body fat orbital region, triceps region and fat overlying rib cage region and moderate depletion muscle mass clavicle region, scapular region, shoulder region, and mild loss  thigh region and calf region pe

## 2019-08-29 NOTE — CONSULTS
[facility] CONSULT      Patient: Blair Ro Date: 2019     : 1945 Attending: Jose Ferreira MD   76year old female      A/P:  Acute on chronic HFrEF (EF 25% -> 35-40%) due to ICM   NYHA Class III, ACC/AHA Stage C.   S/p Bi denies palpitations, lightheadedness. She has not had falls/syncope.       Past Medical History:   Diagnosis Date   • Arrhythmia     AICD/Pacer   • Atherosclerosis of coronary artery 2010   • Back problem    • Basal cell carcinoma, forehead 2012    excision Performed by Nisa Fletcher MD at [facility] ENDOSCOPY   • OTHER SURGICAL HISTORY      watchmand device 2018   • REPAIR RETINAL BREAKS, CRYOTHERAPY - OD - RIGHT EYE     • WATCHMAN N/A 5/25/2018    Performed by Anthony Trujillo MD at [address] [place] [address] Exposure: Not Asked        Hobby Hazards: Not Asked        Sleep Concern: Not Asked        Stress Concern: Not Asked        Weight Concern: Not Asked        Special Diet: Not Asked        Back Care: Not Asked        Exercise: Not Asked        Bike Helmet: data filed at 8/29/2019 0600  Gross per 24 hour   Intake 447 ml   Output 0 ml   Net 447 ml       Physical Assessment:  Gen: alert, comfortable  HEENT: MMM  Neck: supple  Heart: RRR, S1,S2  Lungs: decreased BS; BL crackles  Abd: soft, Nt, ND  Ext: trace LE output/cardiac index ( Assumed Leonardo) =5.8/2.9  TPG 12mmHg  PVR 2.1Wood units  SVR 800Dynes-sec-cm^-5  Sats:   Ao 93%  PA 62.8%    INTERPRETATION:  -Mild post-capillary pulmonary hypertension with preserved cardiac  indices on dobutamine 2.5 mcg/kg/min  -Sev

## 2019-08-30 NOTE — PLAN OF CARE
Midline inserted for levophed drip. Weaning down as tolerated. Pt placed in chair position in bed until off levophed drip.        Problem: METABOLIC/FLUID AND ELECTROLYTES - ADULT  Goal: Electrolytes maintained within normal limits  Description  INTERVENTIO of decreased coronary artery perfusion - ex.  Angina  - Evaluate fluid balance, assess for edema, trend weights  Outcome: Not Progressing  Goal: Absence of cardiac arrhythmias or at baseline  Description  INTERVENTIONS:  - Continuous cardiac monitoring, mon

## 2019-08-30 NOTE — PROGRESS NOTES
Notified by RN that she had administered 100 ml albumin for bp of 70 systolic. BP remains 70 systolic after albumin infusion. Medical record partially reviewed. BP has been intermittently low and pt was started on midodrine.   Discussed with Dr. Susan marsh

## 2019-08-30 NOTE — PROGRESS NOTES
[place] [facility] - [facility]    Progress Note    Maria D Casillas Patient Status:  Inpatient    1945 MRN [id number]   Location [facility] 2W/SW Attending Jose M Parish MD   [facility] Day # 2 PCP Erin Fowler MD         Assessment and Plan:   Acu °F (36.4 °C), temperature source Temporal, resp. rate 16, height 5' 1\" (1.549 m), weight 151 lb 6.4 oz (68.7 kg), SpO2 97 %, not currently breastfeeding.   Intake/Output:        Last 24 hours:     Intake/Output Summary (Last 24 hours) at 8/30/2019 1138  La 08/30/2019    LIP 36 12/11/2018     (H) 06/27/2016    MG 2.5 08/29/2019    PHOS 5.8 (H) 08/30/2019    TROP 0.098 (HH) 08/28/2019    B12 471 02/08/2019                   Alok Dumont MD  8/30/2019

## 2019-08-30 NOTE — PROGRESS NOTES
[facility]    Progress Note    Jeff Sr Patient Status:  Inpatient    1945 MRN [id number]   Location [facility] 2W/SW Attending Cedric Segovia MD   Hosp Day # 2 PCP Olivia Mayberry MD       Subjective:   Jeff Sr EXTREMITIES: There was no edema, clubbing or cyanosis  NEUROLOGICAL:  There was no focal deficit. Cranial nerves intact.          Current Scheduled Inpatient Meds:     • [START ON 8/31/2019] digoxin  125 mcg Oral Daily   • allopurinol  100 mg Oral Daily T,Th,Sat  - Plans for HD tomorrow. Hypotension  - on levophed and midodrine  - goal sBP 85 per renal.   - Consult Critical care Dr. Sallie Sylvester to see. PCU status for now. - Monitor.      DM II  - Hba1c 6.5   - Insuli S/S     Acute hypoxemic respiratory f

## 2019-08-30 NOTE — PROGRESS NOTES
[facility] Note:  Renal Dose Adjustment for Tramadol (ULTRAM)    Janett Arreola has been prescribed Tramadol (ULTRAM) 50 mg orally every 6 hours as needed for pain. Estimated Creatinine Clearance: 7 mL/min (A) (based on SCr of 5.33 mg/dL (H)).     Her c

## 2019-08-30 NOTE — CONSULTS
[facility] - [facility]    Report of Consultation    Seymour Fly Patient Status:  Inpatient    1945 MRN [id number]   Location [facility] 2W/SW Attending Janene Campbell MD   Hosp Day # 2 PCP Shelly Rees MD     Date of Admission:  8 Visual impairment     readers       Past Surgical History  Past Surgical History:   Procedure Laterality Date   • A.V. FISTULA Left 6/28/2019    Performed by Williams Mccoy MD at [address] [place] [address] MAIN OR   • AV FISTULA OR GRAFT VENOUS Left 06/28/2019    left brachio- mL 10 mL Intravenous PRN   lidocaine HCl (XYLOCAINE) 1 % injection 0.5 mL 0.5 mL Other Once PRN   [START ON 8/31/2019] digoxin (LANOXIN) tab 125 mcg 125 mcg Oral Daily   allopurinol (ZYLOPRIM) tab 100 mg 100 mg Oral Daily   Pantoprazole Sodium (PROTONIX) E daily.   digoxin 0.125 MG Oral Tab Take 1 tablet (125 mcg total) by mouth Every Tuesday, Thursday, and Saturday. allopurinol 100 MG Oral Tab Take 1 tablet (100 mg total) by mouth daily.    Pantoprazole Sodium 40 MG Oral Tab EC Take 1 tablet (40 mg total) weakness, dizziness,   Psychiatric: denies depression, anxiety  Hematologic: denies bruising        Physical Exam:   Blood pressure 100/52, pulse 82, temperature 97.8 °F (36.6 °C), temperature source Temporal, resp.  rate 20, height 5' 1\" (1.549 m), weight 3 diastolic dysfunction.  -Midodrine to be continued inpatient clinical presentation.  -Beta-blockers on hold at this time.  -Patient how aggressive to be from a cardiology management standpoint.   Right catheterization had been performed in the past.  -Hem

## 2019-08-30 NOTE — PLAN OF CARE
Problem: Diabetes/Glucose Control  Goal: Glucose maintained within prescribed range  Description  INTERVENTIONS:  - Monitor Blood Glucose as ordered  - Assess for signs and symptoms of hyperglycemia and hypoglycemia  - Administer ordered medications to m for bleeding and/or hematoma  - Assess quality of pulses, skin color and temperature  - Assess for signs of decreased coronary artery perfusion - ex.  Angina  - Evaluate fluid balance, assess for edema, trend weights  Outcome: Progressing  Goal: Absence of integrity  - Monitor for areas of redness and/or skin breakdown  - Initiate interventions, skin care algorithm/standards of care as needed  Outcome: Progressing     Problem: MUSCULOSKELETAL - ADULT  Goal: Return mobility to safest level of function  Luri

## 2019-08-30 NOTE — PROGRESS NOTES
Vascular Access Note  Powerglide ST midline catheter insertion    Vascular Access Screening:   Allergies to Lidocaine: no  Allergies to Latex: no  Presence of Pacemaker/Defibrillator: Left Side  Mastectomy with Lymph Node Dissection: No  AV Fistula / AV Gra

## 2019-08-31 NOTE — PROGRESS NOTES
[facility]    [facility] Note    Reilly Round Patient Status:  Inpatient    1945 MRN [id number]   Location [facility] 2W/SW Attending Alba Soto MD   Hosp Day # 3 PCP Delaney Thomas MD       Subjective:   Reilly Round cyanosis  Neurological:  Grossly normal    Results:     Laboratory Data:  Lab Results   Component Value Date    WBC 11.0 08/30/2019    HGB 14.0 08/30/2019    HCT 43.3 08/30/2019    .0 (L) 08/30/2019    CREATSERUM 5.33 (H) 08/30/2019    BUN 33 (H) 08

## 2019-08-31 NOTE — PROGRESS NOTES
RN John Haile reported to  that patient's physician just spoke with patient about hospice.  Therefore RN suggested a spiritual care visit may be beneficial. During visit patient asked  to pray later, \"If I'm going to go [die] that it would go sm

## 2019-08-31 NOTE — PROGRESS NOTES
[facility]    Progress Note    Jeff Sr Patient Status:  Inpatient    1945 MRN [id number]   Location [facility] 2W/SW Attending Cedric Segovia MD   Hosp Day # 3 PCP Olivia Mayberry MD       Subjective:   Jeff Sr allopurinol  100 mg Oral Daily   • Pantoprazole Sodium  40 mg Oral BID AC   • atorvastatin  40 mg Oral Nightly   • Midodrine HCl  7.5 mg Oral TID   • multivitamin  1 tablet Oral Daily   • Heparin Sodium (Porcine)  5,000 Units Subcutaneous 2 times per day hypoxemic respiratory failure  - secondary to CHF/Volume overload  - cont to remove volume as BP tolerates with HD.   - supplemental oxygen as needed.   - IS/  - CXR reviewed.      Other medical problems  CAD s/p CABG  Severe MR and TR  Elevate troponin sec

## 2019-08-31 NOTE — PROGRESS NOTES
[facility]    Cardiology Progress Note  [facility]    Janett Arreola Patient Status:  Inpatient    1945 MRN [id number]   Location [facility] 2W/SW Attending Remi Galarza MD   Hosp Day # 3 PCP Itz Simpson 1290 --       Output    Urine  --  0  --    Urine -- 0 --    Other  3000  --  --    HD Output (Hemodialysis Catheter Permacath (tunneled) Right Subclavian) 3000 -- --    Total Output 3000 0 --       Net I/O     -1247 1290 --          Vent Settings:      He 32.0   MCHC 32.8 32.8 32.3   RDW 17.6* 17.2* 17.5*   NEPRELIM 9.41* 8.10*  --    WBC 11.8* 9.9 11.0   .0 101.0* 100.0*       No results for input(s): BNPML in the last 168 hours.     Recent Labs   Lab 08/28/19  1008 08/28/19  1155   TROP 0.105* 0.098

## 2019-08-31 NOTE — PROGRESS NOTES
[facility]     Progress Note        Jose Hernadez Patient Status:  Inpatient    1945 MRN [id number]   Location [facility] 2W/SW Attending Claudette Harsh, MD   Hosp Day # 3 PCP Jesus Lantigua MD       Subjective:   Patient s 15 g Oral Q15 Min PRN   Insulin Aspart Pen (NOVOLOG) 100 UNIT/ML flexpen 1-5 Units 1-5 Units Subcutaneous TID CC       Continuous Infusions:   • norepinephrine 2.5 mcg/min (08/31/19 1317)       Physical Exam  Constitutional: no acute distress  HEENT: PERRL

## 2019-08-31 NOTE — PLAN OF CARE
Pt continues to feel weak, requiring 6L high flow oxygen, appetite poor. Had dialysis today 2L was pulled off. Pt is anuric . BP continues to decrease systolic in 69'M when pressor drip is decreased at times, will continue to try to wean.  Pt takes meds wel [place]  - Provide timely, complete, and accurate information to patient/family  - Incorporate patient and family knowledge, values, beliefs, and cultural backgrounds into the planning and delivery of care  - Encourage patient/family to participate in care a electrolyte imbalances  - Administer electrolyte replacement as ordered  - Monitor response to electrolyte replacements, including rhythm and repeat lab results as appropriate  - Fluid restriction as ordered  - Instruct patient on fluid and nutrition restr during mobilization  - Obtain PT/OT consults as needed  - Advance activity as appropriate  - Communicate ordered activity level and limitations with patient/family  8/31/2019 1727 by Vinayak Martínez RN  Outcome: Not Progressing  8/31/2019 1725 by Radha Torres

## 2019-09-01 NOTE — PROGRESS NOTES
[facility]    Cardiology Progress Note  [facility]    Edenilson Downs Patient Status:  Inpatient    1945 MRN [id number]   Location [facility] 2W/SW Attending Wanda Tatum MD   Hosp Day # 4 PCP John [address]  100  --    P[address] 410 100 --    I.V.  880  171.6  --    I.V. 880 -- --    Volume (mL) Norepinephrine -- 171.6 --    Total Intake 1290 271.6 --       Output    Urine  0  --  --    Urine 0 -- --    Other  --  2000  --    HD Output (Hemodialysis C 28.0 24.0     Recent Labs   Lab 08/28/19  0917 08/29/19  0428 08/30/19  0422 09/01/19  0642   RBC 4.80 4.17 4.38 4.47   HGB 15.3 13.2 14.0 14.1   HCT 46.7 40.3 43.3 45.2   MCV 97.3 96.6 98.9 101.1*   MCH 31.9 31.7 32.0 31.5   MCHC 32.8 32.8 32.3 31.2   RDW

## 2019-09-01 NOTE — PLAN OF CARE
Pt stated she \"feels better today\" appetite improved, does not like Nepro supplements. O2 at 5L HF Off Levo drip for now, if SBP is in 70's may restart. Daughter here from [place], will probably have Palliative consult on Tuesday.  Blood sugars in low 200's hemodynamic stability  Description  INTERVENTIONS:  - Monitor vital signs, rhythm, and trends  - Monitor for bleeding, hypotension and signs of decreased cardiac output  - Evaluate effectiveness of vasoactive medications to optimize hemodynamic stability appropriate  - Instruct patient on fluid and nutrition restrictions as appropriate  Outcome: Progressing     Problem: SKIN/TISSUE INTEGRITY - ADULT  Goal: Skin integrity remains intact  Description  INTERVENTIONS  - Assess and document risk factors for pre SOB or any respiratory difficulty  - Respiratory Therapy support as indicated  - Manage/alleviate anxiety  - Monitor for signs/symptoms of CO2 retention  Outcome: Not Progressing

## 2019-09-01 NOTE — PROGRESS NOTES
[facility]    Progress Note    Tristen Garcia Patient Status:  Inpatient    1945 MRN [id number]   Location [facility] 2W/SW Attending Radha Sears MD   [facility] Day # 4 PCP Master Motta MD       Subjective:   Tristen Garcia noted  Musculoskeletal: full ROM all extremities good strength  no deformities  Extremities: no edema, cyanosis  Neurological:  Grossly normal    Results:     Laboratory Data:  Lab Results   Component Value Date    WBC 9.8 09/01/2019    HGB 14.1 09/01/2019

## 2019-09-01 NOTE — PROGRESS NOTES
[facility]    Progress Note    Bernardo Anaya Patient Status:  Inpatient    1945 MRN [id number]   Location [facility] 2W/SW Attending Jeniffer Trevino MD   [facility] Day # 4 PCP John Dowling MD       Subjective:   Bernardo Héctor Before breakfast   • allopurinol  100 mg Oral Daily   • Pantoprazole Sodium  40 mg Oral BID AC   • atorvastatin  40 mg Oral Nightly   • Midodrine HCl  7.5 mg Oral TID   • multivitamin  1 tablet Oral Daily   • Heparin Sodium (Porcine)  5,000 Units Subcutane 6.5   - Insuli S/S     Severe MR  - per cards. Acute hypoxemic respiratory failure  - secondary to CHF/Volume overload  - cont to remove volume as BP tolerates with HD.   - supplemental oxygen as needed.   - IS/  - CXR reviewed.      Other medical probl

## 2019-09-01 NOTE — PROGRESS NOTES
[facility]    Progress Note    Edenilson Downs Patient Status:  Inpatient    1945 MRN [id number]   Location [facility] 2W/SW Attending Aidee Eastman MD   Hosp Day # 4 PCP Darcy Reese MD        Subjective:     Constitut 09/01/2019    HGB 14.1 09/01/2019    HCT 45.2 09/01/2019    PLT 84.0 (L) 09/01/2019    CREATSERUM 5.23 (H) 09/01/2019    BUN 32 (H) 09/01/2019     (L) 09/01/2019    K 4.7 09/01/2019    CL 93 (L) 09/01/2019    CO2 24.0 09/01/2019     (H) 09/01/

## 2019-09-01 NOTE — PLAN OF CARE
Problem: Diabetes/Glucose Control  Goal: Glucose maintained within prescribed range  Description  INTERVENTIONS:  - Monitor Blood Glucose as ordered  - Assess for signs and symptoms of hyperglycemia and hypoglycemia  - Administer ordered medications to m nutrition restrictions as appropriate  Outcome: Progressing  Goal: Hemodynamic stability and optimal renal function maintained  Description  INTERVENTIONS:  - Monitor labs and assess for signs and symptoms of volume excess or deficit  - Monitor intake, out if applicable  - Encourage broncho-pulmonary hygiene including cough, deep breathe, Incentive Spirometry  - Assess the need for suctioning and perform as needed  - Assess and instruct to report SOB or any respiratory difficulty  - Respiratory Therapy suppo

## 2019-09-02 NOTE — PLAN OF CARE
Problem: Diabetes/Glucose Control  Goal: Glucose maintained within prescribed range  Description  INTERVENTIONS:  - Monitor Blood Glucose as ordered  - Assess for signs and symptoms of hyperglycemia and hypoglycemia  - Administer ordered medications to m rounding, side rails up x 3, call light within pt reach.       Problem: Patient/Family Goals  Goal: Patient/Family Long Term Goal  Description  Patient's Long Term Goal:Go home    Interventions:  - Back on schedule with dialysis, set up home O2  - See addit saturation or ABGs  - Provide Smoking Cessation handout, if applicable  - Encourage broncho-pulmonary hygiene including cough, deep breathe, Incentive Spirometry  - Assess the need for suctioning and perform as needed  - Assess and instruct to report SOB o

## 2019-09-02 NOTE — PROGRESS NOTES
Double RN skin check done prior to transfer off Unit. Skin check performed by this RN and Itz Bassett. Wounds are as follows: redness to buttocks, bruising/abrasions/ulcers to bilat lower and upper extremities.      Will remain available for any further q

## 2019-09-02 NOTE — PLAN OF CARE
Problem: Diabetes/Glucose Control  Goal: Glucose maintained within prescribed range  Description  INTERVENTIONS:  - Monitor Blood Glucose as ordered  - Assess for signs and symptoms of hyperglycemia and hypoglycemia  - Administer ordered medications to m bleeding and/or hematoma  - Assess quality of pulses, skin color and temperature  - Assess for signs of decreased coronary artery perfusion - ex.  Angina  - Evaluate fluid balance, assess for edema, trend weights  Outcome: Progressing  Goal: Absence of card integrity  - Monitor for areas of redness and/or skin breakdown  - Initiate interventions, skin care algorithm/standards of care as needed  Outcome: Progressing     Problem: MUSCULOSKELETAL - ADULT  Goal: Return mobility to safest level of function  Luri injury  Description  INTERVENTIONS:  - Assess pt frequently for physical needs  - Identify cognitive and physical deficits and behaviors that affect risk of falls.   - [place] fall precautions as indicated by assessment.  - Educate pt/family on patient sa

## 2019-09-02 NOTE — PROGRESS NOTES
[facility]    Progress Note    Elvin Cortes Patient Status:  Inpatient    1945 MRN [id number]   Location [facility] 3W/SW Attending Consuelo Solano MD   Hosp Day # 5 PCP Vira Chapa MD       Subjective:   Elvin Cortes noted  Musculoskeletal: full ROM all extremities good strength  no deformities  Extremities: no edema, cyanosis  Neurological:  Grossly normal    Results:     Laboratory Data:  Lab Results   Component Value Date    WBC 10.9 09/02/2019    HGB 14.4 09/02/201

## 2019-09-02 NOTE — PROGRESS NOTES
Pt refuses to wear blood pressure cuff. Spoke to patient about the importance of checking blood pressure. Pt continues to refuse. Will attempt to check blood pressure later.  monitor

## 2019-09-02 NOTE — PROGRESS NOTES
[facility]     Progress Note        Maria D Casillas Patient Status:  Inpatient    1945 MRN [id number]   Location [facility] 2W/SW Attending Jose M Parish MD   Hosp Day # 5 PCP Erin Fowler MD       Subjective:   Patient s PRN   glucose (DEX4) oral liquid 15 g 15 g Oral Q15 Min PRN   Insulin Aspart Pen (NOVOLOG) 100 UNIT/ML flexpen 1-5 Units 1-5 Units Subcutaneous TID CC       Continuous Infusions:   • norepinephrine Stopped (09/01/19 0355)       Physical Exam  Constitutiona obtain palliative care consultation      Abilio Grove, DO  Pulmonary [address] [place] [address]

## 2019-09-02 NOTE — PROGRESS NOTES
[facility]    Progress Note    Tristen Garcia Patient Status:  Inpatient    1945 MRN [id number]   Location [facility] 2W/SW Attending Reinaldo Pearson MD   Hosp Day # 5 PCP Master Motta MD       Subjective:   Tristen Garcia • Levothyroxine Sodium  25 mcg Oral Before breakfast   • allopurinol  100 mg Oral Daily   • Pantoprazole Sodium  40 mg Oral BID AC   • atorvastatin  40 mg Oral Nightly   • Midodrine HCl  7.5 mg Oral TID   • multivitamin  1 tablet Oral Daily   • Heparin tomorrow. - K 5.5 today      Hypotension  - stable. - on midodrine. Weaned levophed  - goal sBP 85 per cards. - critical care following   - Monitor.      DM II  - Hba1c 6.5   - Insuli S/S      Severe MR  - per cards.    - not a candidate for more aggr

## 2019-09-02 NOTE — PLAN OF CARE
Problem: Diabetes/Glucose Control  Goal: Glucose maintained within prescribed range  Description  INTERVENTIONS:  - Monitor Blood Glucose as ordered  - Assess for signs and symptoms of hyperglycemia and hypoglycemia  - Administer ordered medications to m hematoma  - Assess quality of pulses, skin color and temperature  - Assess for signs of decreased coronary artery perfusion - ex.  Angina  - Evaluate fluid balance, assess for edema, trend weights  Outcome: Progressing  Goal: Absence of cardiac arrhythmias Monitor for areas of redness and/or skin breakdown  - Initiate interventions, skin care algorithm/standards of care as needed  Outcome: Progressing     Problem: Impaired Functional Mobility  Goal: Achieve highest/safest level of mobility/gait  Description

## 2019-09-02 NOTE — PROGRESS NOTES
[facility]    Cardiology Progress Note    Edenilson Downs Patient Status:  Inpatient    1945 MRN [id number]   Location [facility] 2W/SW Attending Wanda Tatum MD   [facility] Day # 5 PCP Darcy Reese MD     2019  Belinda Khanna 5.33* 5.23* 6.35*   CA  --  9.1 8.9 9.3 8.4* 9.0   MG 3.2*  --  2.5  --   --  2.3   PHOS  --   --   --  5.8*  --   --    GLU  --  88 128* 167* 211* 195*       Recent Labs   Lab 08/30/19  0422   ALB 5.0       Recent Labs   Lab 08/28/19  1008 08/28/19  1153 liquid 15 g 15 g Oral Q15 Min PRN   Insulin Aspart Pen (NOVOLOG) 100 UNIT/ML flexpen 1-5 Units 1-5 Units Subcutaneous TID CC         Assessment:  Patient Active Problem List:     Coronary atherosclerosis     Primary cardiomyopathy (HCC)     Chronic airway MD  [address] [facility] Cardiology. Interventional Cardiology.   [phone]

## 2019-09-02 NOTE — PROGRESS NOTES
Patient to transfer from 227 to 308. Report given to [facility] RN. Medications, labs, plan of care reviewed. All belongings with patient. No further questions or concerns. Daughter accompanied pt on transfer.      Additional transfer notes include: none

## 2019-09-03 PROBLEM — Z71.89 COUNSELING REGARDING ADVANCE CARE PLANNING AND GOALS OF CARE: Status: ACTIVE | Noted: 2019-01-01

## 2019-09-03 PROBLEM — N19 RENAL FAILURE: Status: ACTIVE | Noted: 2019-01-01

## 2019-09-03 NOTE — CONSULTS
Maria Luisa 4  [id number]  Hospital Day #6  Date of Consult: 09/03/19  Patient seen at: [facility]le [address]    Reason for Consultation:      Consult requested by Viridiana Summers and Celia Strauss for e Disorder of eye, unspecified 2012    RPE changes - OU   • Diverticulosis of large intestine 04/03/2018   • Esophageal reflux    • Gout    • H/O echocardiogram 2011    LVEF 15%   • High blood pressure    • High cholesterol    • History of blood transfusion 11/12/2012    local excision - Basal cell carcinoma of the left forehead   • REPAIR RETINAL BREAKS, CRYOTHERAPY - OD - RIGHT EYE     • WATCHMAN N/A 5/25/2018    Performed by Keenan Griffin MD at [facility]       Social History:      Marital Status:   Diamante Givens Sodium (Porcine) 5000 UNIT/ML injection 5,000 Units, 5,000 Units, Subcutaneous, 2 times per day  •  acetaminophen (TYLENOL) tab 650 mg, 650 mg, Oral, Q6H PRN  •  ondansetron HCl (ZOFRAN) injection 4 mg, 4 mg, Intravenous, Q6H PRN  •  dextrose 50 % injectio dry.    Palliative Performance Scale:     Palliative Performance Scale   % Ambulation Activity Level Self-Care Intake Consciousness   100 Full Normal Full   Normal Full   90 Full No disease  Normal Full Normal Full   80 Full Some disease  Normal w/effort F to make it. Jenn Grayson explained that they both talked about this in general. She was grateful for the [address] and requested to d/w hospice services.     From my assessment, she is very likely GIP appropriate especially if we will stop all aggressive treatments and Discussed today’s visit with LILA Wan and Dr Terry Wilkes. Thank you for allowing Palliative Care services to participate in the care of Leann.     A total of 60 minutes were spent on this consult; which included all of the following: direct face to face joea

## 2019-09-03 NOTE — HOSPICE RN NOTE
[facility] met with daughter Jamison Zavaleta to discuss Hospice. [facility] signed consents for Hospice. DNR in place. Patient is GIP appropriate with Diagnosis of Renal Failure and the need to be GIP to manage her dyspnea /pain with Morphine drip. POC was discus

## 2019-09-03 NOTE — CM/SW NOTE
BPCI    Met with patient at bedside to explain the BPCI/Medicare program.  Patient agreed with phone f/u for 3 months from [address] [place] [address] after discharge from [facility]. Patient was enrolled under . BPCI/Medicare letter provided.

## 2019-09-03 NOTE — CM/SW NOTE
9/3: SW spoke to [facility], confirmed that patient has history with their SNF. Updates sent via [facility] by [place]. 1:13pm: SW received MDO for residential hospice follow meeting. Patient may be appropriate for GIP. SW noted referral in via [place].  Spoke

## 2019-09-03 NOTE — DISCHARGE SUMMARY
[place] [facility] - [facility]    Discharge Summary    Lulu Cohen Patient Status:  Inpatient    1945 MRN [id number]   Location [facility] 3W/SW Attending Amanda Alfred MD   [facility] Day # 6 PCP Telma Thurman MD     Date of Admission:  sciatica     Multiple wounds of skin     Hyperkalemia     Acute on chronic congestive heart failure, unspecified heart failure type ([facility] [address]     ESRD (end stage renal disease) on dialysis [facility])     Counseling regarding advance care planning and goals of care respiratory failure  - secondary to CHF/Volume overload  - cont to remove volume as BP tolerates with HD.   - supplemental oxygen as needed.   - IS/  - CXR reviewed.      Other medical problems  CAD s/p CABG  Severe MR and TR  Elevate troponin secondary to Succinate ER 25 MG Tb24  Commonly known as: Toprol XL      Take 0.5 tablets (12.5 mg total) by mouth daily.    Quantity:  30 tablet  Refills:  5     Midodrine HCl 5 MG Tabs  Commonly known as:  PROAMATINE      TAKE 1 TABLET BY MOUTH THREE TIMES DAILY   Flavio Youssef

## 2019-09-03 NOTE — PROGRESS NOTES
[facility]    Progress Note    Ieshaeagle BrowneKayden Patient Status:  Inpatient    1945 MRN [id number]   Location [facility] 3W/SW Attending Radha Carrillo MD   [address] Day # 6 PCP Davis Pal MD         Assessment and Plan:   Acut times per day   • Insulin Aspart Pen  1-5 Units Subcutaneous TID CC             Results:     Lab Results   Component Value Date    WBC 9.9 09/03/2019    HGB 14.4 09/03/2019    HCT 45.1 09/03/2019    .0 (L) 09/03/2019    CREATSERUM 7.78 (H) 09/03/201

## 2019-09-03 NOTE — PROGRESS NOTES
[place] [facility] - [facility]     Progress Note        Monalisa Jelena Patient Status:  Inpatient    1945 MRN [id number]   Location [facility] 2W/SW Attending Amanda Hilton MD   Hosp Day # 6 PCP Bethel Tamayo MD       Subjective:   Patient s 15 g 15 g Oral Q15 Min PRN   Insulin Aspart Pen (NOVOLOG) 100 UNIT/ML flexpen 1-5 Units 1-5 Units Subcutaneous TID CC       Continuous Infusions:   • norepinephrine Stopped (09/01/19 0355)       Physical Exam  Constitutional: no acute distress  HEENT: PERR

## 2019-09-03 NOTE — PLAN OF CARE
Problem: Diabetes/Glucose Control  Goal: Glucose maintained within prescribed range  Description  INTERVENTIONS:  - Monitor Blood Glucose as ordered  - Assess for signs and symptoms of hyperglycemia and hypoglycemia  - Administer ordered medications to m administer replacement therapy as ordered  Outcome: Progressing     Problem: METABOLIC/FLUID AND ELECTROLYTES - ADULT  Goal: Electrolytes maintained within normal limits  Description  INTERVENTIONS:  - Monitor labs and rhythm and assess patient for signs a needed  - Advance activity as appropriate  - Communicate ordered activity level and limitations with patient/family  Outcome: Progressing     Problem: Impaired Functional Mobility  Goal: Achieve highest/safest level of mobility/gait  Description  Cherelle Vizcaino resources  Description  INTERVENTIONS:  - Identify barriers to discharge w/pt and caregiver  - Include patient/family/discharge partner in discharge planning  - Arrange for needed discharge resources and transportation as appropriate  - Identify discharge

## 2019-09-03 NOTE — PROGRESS NOTES
[facility]  Nephrology Daily Progress Note    Reilly Boogie  [id number]  76year old      HPI:   Reilly Boogie is a 76year old female. Feels weak and tired. Some sense of feeling SOB.   BPs borderline low      ROS:     Constitutional:  Neg age    Labs:  Lab Results   Component Value Date    WBC 9.9 09/03/2019    HGB 14.4 09/03/2019    HCT 45.1 09/03/2019    .0 09/03/2019    CREATSERUM 7.78 09/03/2019    BUN 59 09/03/2019     09/03/2019    K 5.6 09/03/2019    CL 90 09/03/2019 HCl (ZOFRAN) injection 4 mg, 4 mg, Intravenous, Q6H PRN  •  dextrose 50 % injection 50 mL, 50 mL, Intravenous, PRN  •  Glucose-Vitamin C (DEX-4) chewable tab 4 tablet, 4 tablet, Oral, Q15 Min PRN  •  glucose (DEX4) oral liquid 15 g, 15 g, Oral, Q15 Min PRN hemodialysis ([address]     Allergic reaction     Allergic reaction, initial encounter     ESRD (end stage renal disease) ([address]     Umbilical hernia     Rash     Chronic midline low back pain without sciatica     Multiple wounds of skin     Hyperkalemia     Acu

## 2019-09-04 NOTE — TELEPHONE ENCOUNTER
HOSPITALIST NURSE    Call received from [facility] ([phone]) requesting office contact information for Dr Cyndi Noble in order to fax death certificate to be signed. Information provided, awaiting fax.

## 2019-09-04 NOTE — TELEPHONE ENCOUNTER
Death Certificate received, completed and signed by Dr Tamiko Parr.  Faxed back to [facility] at [phone]

## 2019-09-04 NOTE — H&P
[address] Patient Status:  Inpatient    1945 MRN [id number]   Location [facility] 4W/SW/SE Attending Latrice Melo MD   Hosp Day # 1 PCP Rip Ricks MD     Date:  2019  Date o 2003 and 2012 approx   • Ischemic cardiomyopathy    • MGD (meibomian gland dysfunction) 2012    OU   • No diabetic retinopathy OU 0328,4541    OU   • Obesity    • Osteoarthritis    • Pulmonary emphysema (HCC)    • Retinal tear     OD   • Visual impairment Years: 30.00        Pack years: 27        Quit date: 2003        Years since quittin.6      Smokeless tobacco: Never Used    Alcohol use: No      Alcohol/week: 0.0 standard drinks    Drug use: No    Allergies/Medications:    Allergies:   Bupropion present. MUSCULOSKELETAL:  There was no deformity. There was full range of motion in all the extremities. EXTREMITIES: There was no edema, clubbing or cyanosis  NEUROLOGICAL:  There was no focal deficit. Cranial nerves II through XII were intact. TR  Elevate troponin secondary to renal insuff  Chronic A.fib s/p watchman device     Patient passed away today at [address] AM.       Jesus Medina MD  9/4/2019

## 2019-09-04 NOTE — SIGNIFICANT EVENT
Pt  at [facility]. Resusitation not attempted as pt was DNR.     Time of Death 65    Family Notified Yes  Name and Ramya Merida (daughter)    MD Silverio Nunez of [facility] [employer] Notified Yes (get Rep Name and Case Number) Jayanttosin Sagastume [id number]     contacte

## 2019-09-04 NOTE — PLAN OF CARE
Problem: Patient Centered Care  Goal: Patient preferences are identified and integrated in the patient's plan of care  Description  Interventions:  - What would you like us to know as we care for you?  - Provide timely, complete, and accurate information consult to assist with strengthening/mobility  - Encourage toileting schedule  Outcome: Progressing  Patient transferred from  and now hospice. A&ox2-3, drowsy. 5L oxygen in place.  Complained back pain last night PRN Morphine given, PRN robinol and atrop

## 2019-09-04 NOTE — PLAN OF CARE
Problem: Patient Centered Care  Goal: Patient preferences are identified and integrated in the patient's plan of care  Description  Interventions:  - What would you like us to know as we care for you? Family informed.   - Provide timely, complete, and acc Encourage toileting schedule  Outcome: Adequate for Discharge     Comfort care measures in place. No morphine drip started. Medications given to ease breathing. Pt  at [address]

## 2019-09-04 NOTE — DISCHARGE SUMMARY
[facility]    Discharge Summary    Brittanie Gates Patient Status:  Inpatient    1945 MRN [id number]   Location [facility] 4W/SW/SE Attending Quan Beasley MD   Hosp Day # 1 PCP Jeet Monterroso MD     Date of Admission: 9/3/2 2 (two) times daily. !! Insulin Pen Needle (TECHLITE PEN NEEDLES) 32G X 4 MM Does not apply Misc  Inject 2 times a day    Lancet Device Does not apply Misc  Use as directed 3 times a day    !! - Potential duplicate medications found.  Please discuss with

## 2019-09-12 ENCOUNTER — MED REC SCAN ONLY (OUTPATIENT)
Dept: INTERNAL MEDICINE CLINIC | Facility: CLINIC | Age: 74
End: 2019-09-12

## 2019-09-16 ENCOUNTER — TELEPHONE (OUTPATIENT)
Dept: CARDIOLOGY | Age: 74
End: 2019-09-16

## 2020-02-04 NOTE — PHYSICAL THERAPY NOTE
"CST  SHORT with Bandar Roger MD  [facility] ([facility]) [address] [address] [phone]             PHYSICAL THERAPY TREATMENT NOTE - INPATIENT     Room Number: 346/346-A       Presenting Problem: SOB and sciatica    Problem List  Principal Problem:    Chronic systolic heart failure (HCC)  Active Problems:    Shortness of breath    Acute on chronic systo "CST  SHORT with Bandar Roger MD  [facility] ([facility]) [address] [address] [phone]             the patient currently need. ..   -   Moving to and from a bed to a chair (including a wheelchair)?: A Little   -   Need to walk in hospital room?: None   -   Climbing 3-5 steps with a railing?: A Little     AM-PAC Score:  Raw Score: 22   PT Approx Degree of

## 2020-03-17 NOTE — TELEPHONE ENCOUNTER
Spoke to patient. She has pain in the outer right breast/flank for a little over a week. No rash or skin lesion in the area. She has a lipoma near the same area but no other lumps or bumps and states nothing has changed with the lipoma.  When she presses in no

## 2022-10-27 NOTE — TELEPHONE ENCOUNTER
Discussed loss of near vision.  Aware will need glasses for all ranges. I approve of the recertification request for occupational therapy and nursing visits.

## 2022-12-13 NOTE — TELEPHONE ENCOUNTER
Done. Refill Protocol Appointment Criteria  · Appointment scheduled in the past 6 months or in the next 3 months  Recent Outpatient Visits            1 month ago Acute sinusitis, recurrence not specified, unspecified location    [facility]

## 2023-05-08 NOTE — PROGRESS NOTES
Procedure hand off report given to Savage SHARP. Pt's vital signs are stable. Procedural access site is dry and intact with no signs and symptoms of bleeding and hematoma.      2/15 0850  Bedside handoff to LILA Wan  Pt transferred to 325  Right chest dry 20

## 2023-07-15 NOTE — SPIRITUAL CARE NOTE
Pt  at 8:20AM. There was no family present at the time of the expiration. Contacted the dtr Jenn Grayson but she hasn't arrive yet.  ML
pt A&OX4. Pt states that he has 7/0 right thigh pain.

## 2023-07-28 NOTE — TELEPHONE ENCOUNTER
Patient calling - she has a general appointment with Dr Valentina Barrera on 8/14 but would like to be seen sooner. Her lipoma on her right chest/neck has grown to the size where it is becoming uncomfortable.  Patient states that when  lays on it while sleeping, it h 28-Jul-2023 11:04

## 2024-04-01 NOTE — PHYSICAL THERAPY NOTE
[facility] ENCOUNTER    URGENT CARE INITIAL EVALUATION    CHIEF COMPLAINT:    Chief Complaint   Patient presents with    Eye Problem       SUBJECTIVE:  Michelle Polo is a 50 year old female presenting today with complaints of drainage from the right eye along with eye redness.  Patient states that last week she developed some URI like symptoms with a clear runny nose.  She then developed left eye drainage and irritation.  She went to urgent care and was started on antibiotic drops.  Patient states that the redness then went to the right eye and she is having yellow drainage from the right as well.  Denies any significant vision changes.  She wears glasses but not contacts.  She also reports purulent rhinorrhea over the last few days with fatigue.  Reports facial pressure to the bilateral maxillary sinuses.        HISTORIES:  ALLERGIES:   Allergen Reactions    Hyomax-Dt Other (See Comments)    Atenolol RASH    Darvocet [Propoxyphene N-Apap] PRURITUS       MEDICATIONS:  See med list     Past Medical History:   Diagnosis Date    Anxiety     Attention deficit disorder     Bipolar 1 disorder (CMD)     Bronchial asthma 04/15/2016    Followed by Dr. George Johnson [phone] [facility]    Chronic pain     Depression     Essential (primary) hypertension     IBS (irritable bowel syndrome)     LGSIL on Pap smear of cervix 02/01/2016    + HPV     PNA (pneumonia)     Followed by Dr. George Johnson [phone] [facility]    Sprain of ankle, unspecified site 03/30/2009    Left       OBJECTIVE:  PHYSICAL EXAM:  Visit Vitals  /74 (BP Location: RUE - Right upper extremity, Patient Position: Sitting)   Pulse (!) 58   Temp 97.6 °F (36.4 °C) (Oral)   Resp 18   LMP  (LMP Unknown) Comment: cont oc's   SpO2 95%     General:  Well hydrated female who appears in no acute distress.  Eyes:  Eye Exam (detailed): EOMI; PERRL; Periorbital: normal   Conjunctiva:  Pt declined PT session at am due feeling tired. Pt just transferred to bed after amb  With RN staff and prolong sitting.  Will try at pm. right without injection but there is some conjunctival hemorrhage to the right.  There is scant yellow drainage noted.    The left, no active drainage however small subconjunctival hemorrhages noted           Visual acuity: Visual Acuity:   see notes  Slitlamp:    Cornea: clear; no abrasion, no ulcer, no circumlimbal injection  AC: nl depth; no hyphyma, no cells/flare  Iris: normal, round and pupils reactive      Ears:  No external tenderness. Normal canals. Normal tympanic membranes.     Nose: Nares erythematous with thick drainage present. Maxillary sinus tenderness.   Throat:  normal   Neck:  no lymphadenopathy. No supraclavicular lymph noted. Supple.  Lungs:  Pt acyanotic. Non-labored breathing.  Cardiac:  bradycardic  Musculoskeletal: Tandem gait is accomplished smoothly  Skin: Warm and dry  Neuro: Alert and oriented x 3.  Psych: Speech and behavior is normal. Mood and affect appropriate.     URGENT CARE COURSE:  Reviewed UC notes from recent visit- thought to be viral conjucntivitis, was started on polytrim.     ASSESSMENT & PLAN:    Acute maxillary sinusitis, recurrence not specified  (primary encounter diagnosis)    Vision test  Plan: VISUAL ACUITY QUANTITATIVE SCREEN BILATERAL    Subconjunctival hemorrhage of both eyes       Finish eye abx, agree likely viral in nature. Will add naphcon A   Will start augmentin for early secondary bacterial sinusitis.    Follow up pcp at end of week       PATIENT INSTRUCTIONS:  via live well

## 2024-04-14 NOTE — TELEPHONE ENCOUNTER
Allopurinol can actually increase gouty attacks when a patient first starts taking it. To prevent these attacks, we usually have the patient take colchicine at the same time and monitor the uric acid level.  I am reluctant to start this medication now as it
I agree with assessment by physicians assistant. Patient needs to be seen by rheumatology as soon as possibleFor joint infections to relieve acute gout attack.
It would be best for the patient to be seen in the office by me or Dr. Satish Ridley prior to initiating Allopurinol.
LMTCB- transfer to triage.
Noted
Patient was left a message to call back.  Transfer to [id number]
Pt called back, informed her PA Danielle's note. Pt verbalized understanding.
Pt returning call and was informed of responses below from both LR and JK as noted below. Pt voiced understanding but states she is not happy with how this was handled and she does not want to see any one else about this; declines rheumatology info.  States
Pt states she saw Amy Zavala for foot pain. She has completed course of antibiotics prescribed, last dose of antibiotic taken Sunday or yesterday.  States the ball of her foot is sensitive and sort of burning, toes are sensitive, pt states symptoms started in ri
Pt states that she was originally issued #5 tabs of the colchicine by the pharm because they were out of it. She took the 5 which helped her, but did cause diarrhea. When she went to  the remaining 25 pills, the bill was about 100$.   She declined
Sent Rx for colchicine to pharmacy. Can take 1-2 tablets daily until gout flare resolves and then stop. Please caution patient about diarrhea as a side effect.  We can discuss in the office after resolution of symptoms about initiating allopurinol for preve
Spoke with patient and states, \"I won't spend my time in the office--I will just suffer with the pain. My daughter is in town and I want to spend time with her. Dr. Talib Parisi would do it for me, but he's so busy. They already know what I have--it's gout!  Wh
declines

## 2024-05-27 NOTE — PROGRESS NOTES
66y old Male with history of T2DM, CKD, HTN, CABG, Hypothyroidism presenting with STEMI.    1. T2DM with hyperglycemia  - Continue Lantus 35 units at night  - Continue Admelog 10 units with meals   - Change ot moderate Admelog correctional scale before meals and bedtime  - Monitor FS before meals and bedtime  - Follow hospital hypoglycemic protocol    2. Hypothyroidism  - TSH normal  - Continue Levothyroxine 100 mcg daily    3. STEMI  - with history of CABG, awaiting PCI until respiratory status improves  - would also benefit from GLP1 agonist/SGLT2 inhibitor outpatient    4. CKD  - eGFR stable in the 40s  - monitor for hypoglycemia    Will continue to follow.    Reza Vanegas MD  [facility]    [address] [place] [address]    T [phone]  F [phone]   Return Office Visit     CHIEF COMPLAINT:    Diabetes     HISTORY OF PRESENT ILLNESS:  Alphonse Delgadillo is a 67year old female who presents for follow up for for Diabetes.        DM HISTORY  Diagnosed around age 64    HISTORY OF DIABETES COMPLICATIONS: :  Hist Subcutaneous Suspension Pen-injector INJECT 74 UNITS UNDER THE SKIN WITH BREAKFAST AND 72 UNITS WITH DINNER Disp: 45 mL Rfl: 3   metolazone 2.5 MG Oral Tab  Disp:  Rfl: 1   bumetanide 2 MG Oral Tab Take 1 tablet (2 mg total) by mouth daily.  Disp: 90 tablet rash, blister, cellulitis,       PHYSICAL EXAM:    08/28/17  1358   BP: 117/64   Pulse: 71   Weight: 209 lb 12.8 oz (95.2 kg)   Height: 5' 1\" (1.549 m)     BMI: Body mass index is 39.64 kg/m².      CONSTITUTIONAL:  awake, alert, cooperative, no apparent di following instructions. Exercise: should target a weight loss of 7% and increase exercise to at least 150min a week.  g). Hypoglycemia recognition and management discussed    2.  Patient’s BP is normal today  3.  LDL is 44  A) Discussed lifestyle modificati 66y old Male with history of T2DM, CKD, HTN, CABG, Hypothyroidism presenting with STEMI.    1. T2DM with hyperglycemia  - Increase Lantus to 38 units at night  - Increase Admelog to 13 units with breakfast and continue 10 units with lunch and dinner  - Change to moderate Admelog correctional scale before meals and bedtime  - Monitor FS before meals and bedtime  - Follow hospital hypoglycemic protocol    2. Hypothyroidism  - TSH normal  - Continue Levothyroxine 100 mcg daily    3. STEMI  - with history of CABG, awaiting PCI until respiratory status improves  - would also benefit from GLP1 agonist/SGLT2 inhibitor outpatient    4. CKD  - eGFR stable in the 40s  - monitor for hypoglycemia    Will continue to follow.    Reza Vanegas MD  [facility]    [address] [place] [address]    T [phone]  F [phone]

## 2025-04-23 NOTE — PLAN OF CARE
April 23, 2025       Vic House MD  [address] [facility] [address]  Via In Basket      Patient: Victor M Michelle   YOB: 1955   Date of Visit: 4/23/2025       Dear Dr. House:    Thank you for referring Victor M Michelle to me for evaluation. Below are my notes for this visit with him.    If you have questions, please do not hesitate to call me. I look forward to following your patient along with you.      Sincerely,        Sergo Malloy PA-C        CC: No Recipients  Sergo Malloy PA-C  4/23/2025  8:01 PM  Sign when Signing Visit    [facility]  Gastroenterology  [facility] [facility], [address]   [phone]    Note to the patient: The 21st Century Cures Act makes medical notes like these available to patients in the interest of transparency. Please be aware that this is a medical document, written in medical language, and intended for doctor-to-doctor communication. Medical documents are intended to carry relevant information and the clinical opinion of the author in a brief format. For this reason, medical notes may seem blunt or direct, and may contain abbreviations or verbiage that are unfamiliar.    Referring Provider: Vic House MD    Chief Complaint: Follow-up (Colon consult diet intolerance )  [Language/: English]    HPI:  Mr. Victor M Michelle is a pleasant 69 year old male with BPH without LUTS, osteoarthritis, cigarette smoker who presents to GI clinic for hospital follow-up.    Consultation 4/23/25:  Patient presents for follow up after recent hospital admission where he reported unintentional weight loss of ~ 10 lbs without recent colonoscopy. At that time he also reported a brother with colon cancer. Patient presents with his sister today and clarifies that his brother had prostate cancer,not colon cancer.     He is still dealing with some constipation, having 2-3 days between bowel movements.     For his PO  Problem: ALTERED NUTRIENT INTAKE - ADULT  Goal: Nutrient intake appropriate for improving, restoring or maintaining nutritional needs  Description  INTERVENTIONS:  - Assess nutritional status and recommend course of action  - Monitor oral intake, labs, a intake, this has improved since using the pantoprazole 40 mg BID. He wants to know if its okay to not use the zofran and reglan since the protonix is enough for him to tolerate diet and denies nausea. During hospital admission he had EGD which showed mild diffuse gastritis and duodenitis. He states he still feels facial/eye pain when he eats which affects how much he eats.     Aspirin or other NSAID use: denies- he is using tylenol for pain.    Last EGD:04/ 2025- mild diffuse gastritis, duodenitis.  Last colonoscopy: reports having one many years ago, unsure when    ROS: 10 systems were reviewed and were negative except as per HPI.    Past Medical/Surgical History:   Past Medical History:   Diagnosis Date    BPH (benign prostatic hyperplasia)     Glaucoma     Sciatic neuralgia     Sleep apnea        Past Surgical History:   Procedure Laterality Date    Back surgery      Eye surgery Bilateral     CATARACT SURGERY    Repair detach retina,scleral buckle Right        Social History:  Social History     Tobacco Use    Smoking status: Every Day     Current packs/day: 1.50     Average packs/day: 1.5 packs/day for 7.3 years (11.0 ttl pk-yrs)     Types: Cigarettes     Start date: 2018     Passive exposure: Past    Smokeless tobacco: Never   Vaping Use    Vaping status: never used   Substance Use Topics    Alcohol use: Not Currently    Drug use: Yes     Frequency: 3.0 times per week     Types: Marijuana       Family History:   Family History   Family history unknown: Yes     Family history of colon cancer: none  Family history of gastric cancer: none  Family history of esophageal cancer: none    Allergies: ALLERGIES:  No Known Allergies    Home Meds:   Current Outpatient Medications   Medication Sig Dispense Refill    atropine (ISOPTO ATROPINE) 1 % ophthalmic solution Place 1 drop into right eye daily. 5 mL 1    moxifloxacin (VIGAMOX) 0.5 % ophthalmic solution Place 1 drop into right eye 4 times daily. 3 mL 0    prednisoLONE  acetate (PRED FORTE) 1 % ophthalmic suspension Place 1 drop into right eye 4 times daily. 5 mL 0    atorvastatin (LIPITOR) 40 MG tablet Take 1 tablet by mouth daily. Begin taking on April 19, 2025. 30 tablet 11    metoCLOPramide (REGLAN) 10 MG tablet Take 1 tablet by mouth in the morning and 1 tablet at noon and 1 tablet in the evening. Take before meals. 90 tablet 0    ondansetron (ZOFRAN ODT) 4 MG disintegrating tablet Place 1 tablet onto the tongue every 8 hours as needed for Nausea. 30 tablet 1    timolol (TIMOPTIC) 0.5 % ophthalmic solution Place 1 drop into both eyes in the morning and 1 drop in the evening. 5 mL 12    pantoprazole (PROTONIX) 40 MG tablet Take 1 tablet by mouth in the morning and 1 tablet in the evening. 60 tablet 1    latanoprost (XALATAN) 0.005 % ophthalmic solution Place 1 drop into both eyes nightly.      acetaZOLAMIDE (DIAMOX) 250 MG tablet Take 250 mg by mouth 3 times daily.      Brimonidine Tartrate-Timolol (COMBIGAN OP) Place 1 drop into both eyes in the morning and 1 drop in the evening.      aspirin (ECOTRIN) 81 MG EC tablet Take 81 mg by mouth daily.       No current facility-administered medications for this visit.       Objective  VITALS:  Vitals:    04/23/25 1545   BP: (!) 166/81   Pulse: (!) 60   Resp: 16   Temp: 98 °F (36.7 °C)       Exam:  Physical Exam  Constitutional:       Appearance: Normal appearance.   HENT:      Head: Normocephalic.   Eyes:      Conjunctiva/sclera: Conjunctivae normal.   Pulmonary:      Comments: Comfortably breathing on room air  Skin:     General: Skin is warm and dry.   Neurological:      Mental Status: He is alert and oriented to person, place, and time.          Labs:  Lab Results   Component Value Date/Time    WBC 8.7 04/18/2025 05:31 AM    WBC 9.7 04/17/2025 05:39 AM    WBC 8.4 04/16/2025 05:15 AM    HGB 14.7 04/18/2025 05:31 AM    HGB 14.1 04/17/2025 05:39 AM    HGB 12.8 (L) 04/16/2025 05:15 AM    HCT 43.4 04/18/2025 05:31 AM    HCT 41.1  04/17/2025 05:39 AM    HCT 37.6 (L) 04/16/2025 05:15 AM     04/18/2025 05:31 AM     04/17/2025 05:39 AM     04/16/2025 05:15 AM     Lab Results   Component Value Date/Time    CO2 23 04/18/2025 05:31 AM    CO2 26 04/17/2025 05:39 AM    CO2 24 04/16/2025 05:15 AM    CO2 29 05/22/2018 01:45 PM    BUN 18 04/18/2025 05:31 AM    BUN 22 (H) 04/17/2025 05:39 AM    BUN 21 (H) 04/16/2025 05:15 AM    BUN 13 05/22/2018 01:45 PM    CREATININE 0.93 04/18/2025 05:31 AM    CREATININE 0.87 04/17/2025 05:39 AM    CREATININE 0.86 04/16/2025 05:15 AM    CREATININE 0.99 05/22/2018 01:45 PM    GLUCOSE 62 (L) 04/18/2025 05:31 AM    GLUCOSE 86 05/22/2018 01:45 PM    CALCIUM 9.0 04/18/2025 05:31 AM    CALCIUM 8.9 04/17/2025 05:39 AM    CALCIUM 8.6 04/16/2025 05:15 AM    CALCIUM 9.6 05/22/2018 01:45 PM     Lab Results   Component Value Date/Time    AST 21 04/18/2025 05:31 AM    AST 17 04/17/2025 05:39 AM    AST 16 04/16/2025 05:15 AM    AST 21 05/22/2018 01:45 PM       Labs were reviewed.     IMAGING/PROCEDURES:    EGD:      Colonoscopy:  4/16/25 EGD  - Erythematous mucosa in the stomach. Biopsied.  - Erythematous duodenopathy   A. Duodenal biopsies:  - Duodenal mucosa with preserved villous architecture and no intraepithelial lymphocytosis.  B. Gastric biopsies:  - Gastric mucosa with mild inactive gastritis and focal intestinal metaplasia.  - Negative for H. pylori, corroborated by immunostain.  C. Distal esophagus biopsies:  - Squamous mucosa with focal reflux esophagitis.  - Scant columnar mucosa, negative for intestinal metaplasia.  D. Proximal esophagus biopsies:  - Squamous mucosa with basal cell hyperplasia and rare nonspecific chronic inflammation.    Other:      Imaging and procedures were reviewed.       Assessment & Plan    Mr. Victor M Michelle is a pleasant 69 year old male with BPH without LUTS, osteoarthritis, cigarette smoker who presents to GI clinic for hospital follow-up.    # Weight loss  - Given no recent  colonoscopy and weight loss, recommend colonoscopy for further evaluation   - Pt and sister clarify that he does not have a family history of colon cancer  - Patient would like to first focus on handling the issues with his vision and eye surgery and would like to schedule procedure out for June.   PLAN:  Colonoscopy scheduled for June. The benefits and risks of the procedure were discussed with the patient and they wish to proceed.     # Constipation  - Continue using daily miralax- instructions given.    # Gastritis and duodenitis  - Continue pantoprazole 40 mg BID  - Okay to stop using zofran and reglan since he is no longer having nausea.     It was a pleasure seeing Mr. Michelle in clinic today. I recommend they return to clinic after endoscopy depending on findings and symptom course.    The plan of care was discussed with the patient who expressed understanding and agreement. The patient was provided the opportunity to ask questions, and all questions were answered to the best of my ability.    Sergo Malloy PA-C  Gastroenterology/Hepatology

## (undated) DEVICE — 3M™ TEGADERM™ TRANSPARENT FILM DRESSING, 1626W, 4 IN X 4-3/4 IN (10 CM X 12 CM), 50 EACH/CARTON, 4 CARTON/CASE: Brand: 3M™ TEGADERM™

## (undated) DEVICE — FORCEP RADIAL JAW 4

## (undated) DEVICE — HEMOCLIP HORIZON SM MULTI

## (undated) DEVICE — SUTURE SILK 4-0 SA63H

## (undated) DEVICE — FLOSEAL HEMOSTATIC MATRIX, 5ML: Brand: FLOSEAL HEMOSTATIC MATRIX

## (undated) DEVICE — SYS WATCHMAN 14F DOUBLE ACCESS

## (undated) DEVICE — SUTURE SILK 2-0

## (undated) DEVICE — VIOLET BRAIDED (POLYGLACTIN 910), SYNTHETIC ABSORBABLE SUTURE: Brand: COATED VICRYL

## (undated) DEVICE — X-RAY DETECTABLE SPONGES,16 PLY: Brand: VISTEC

## (undated) DEVICE — SNARE CAPTIFLEX MICRO-OVL OLY

## (undated) DEVICE — DRAPE,EXTREMITY,89X128,STERILE: Brand: MEDLINE

## (undated) DEVICE — IMPERVIOUS STOCKINETTE: Brand: DEROYAL

## (undated) DEVICE — TRAP 4 CPTR CHMBR N EZ INLN

## (undated) DEVICE — ENDOSCOPY PACK UPPER: Brand: MEDLINE INDUSTRIES, INC.

## (undated) DEVICE — ENCORE® PERRY STYLE 42 PF SIZE 8, STERILE LATEX POWDER-FREE SURGICAL GLOVE: Brand: ENCORE

## (undated) DEVICE — CV: Brand: MEDLINE INDUSTRIES, INC.

## (undated) DEVICE — INDICATED FOR SURGICAL CLAMPING DURING CARDIOVASCULAR PERIPHERAL VASCULAR, AND GENERAL SURGERY.: Brand: SOFT/FIBRA® SPRING CLIP

## (undated) DEVICE — CHLORAPREP 26ML APPLICATOR

## (undated) DEVICE — SUCTION CANISTER, 3000CC,SAFELINER: Brand: DEROYAL

## (undated) DEVICE — Device: Brand: DEFENDO AIR/WATER/SUCTION AND BIOPSY VALVE

## (undated) DEVICE — SUTURE PROLENE 7-0 BV-1

## (undated) DEVICE — 12 ML SYRINGE LUER-LOCK TIP: Brand: MONOJECT

## (undated) DEVICE — STANDARD HYPODERMIC NEEDLE,POLYPROPYLENE HUB: Brand: MONOJECT

## (undated) DEVICE — DERMABOND LIQUID ADHESIVE

## (undated) DEVICE — SOL  .9 1000ML BAG

## (undated) DEVICE — INTENDED TO BE USED TO OCCLUDE, RETRACT AND IDENTIFY ARTERIES, VEINS, TENDONS AND NERVES IN SURGICAL PROCEDURES: Brand: STERION®  VESSEL LOOP

## (undated) DEVICE — SUTURE SILK 3-0 SA64H

## (undated) DEVICE — TOWEL OR WHITE STRL

## (undated) DEVICE — ENDOSCOPY PACK - LOWER: Brand: MEDLINE INDUSTRIES, INC.

## (undated) DEVICE — SUTURE PROLENE 6-0 BV-1

## (undated) DEVICE — SUTURE VICRYL 5-0 J495H

## (undated) DEVICE — CAPSULE ENDO PILL CAM SB3

## (undated) NOTE — LETTER
[address] [facility] FOR BLOOD OR BLOOD COMPONENTS      In the course of your treatment, it may become necessary to administer a transfusion of blood or blood components.  This form provides basic information concerning this proc explain the alternatives to you if it has not already been done. I,Vero Rowe, have read/had read to me the above. I understand the matters bearing on the decision whether or not to authorize a transfusion of blood or blood components.  I have no questi

## (undated) NOTE — MR AVS SNAPSHOT
[address]  1200 [facility]  [phone]               Thank you for choosing us for your health care visit with Pilo Hickamn MD.  We are glad to serve you and happy to provide you with this s Yajaira Medina, [facility]  [address] [place], [place]    It is the patient's responsibility to check with and follow their insurance company's guidelines for prior authorization for this test.  You may be held resp 114/60 mmHg 61 97 °F (36.1 °C) (Tympanic) 5' 2\" (1.575 m) 222 lb 14.4 oz (101.107 kg) 40.76 kg/m2         Current Medications          This list is accurate as of: 2/23/17 10:01 PM.  Always use your most recent med list.                Acetaminophen-Code Take 1 tablet by mouth 2 (two) times daily. Commonly known as:  BACTRIM DS           TYLENOL PM EXTRA STRENGTH  MG/30ML Liqd   Generic drug:  Diphenhydramine-APAP (sleep)   Take  by mouth.            Rodrigue Pena

## (undated) NOTE — MR AVS SNAPSHOT
[address] [address]  [phone]               Thank you for choosing us for your health care visit with Katie Roberts MD.  We are glad to serve you and happy to provide you with this summar Commonly known as:  SEAN CONTOUR TEST           * SEAN CONTOUR TEST Strp   Generic drug:  Glucose Blood   USE TWO TO THREE TIMES DAILY AS DIRECTED           HUMULIN 70/30 KWIKPEN (70-30) 100 UNIT/ML Supn   Generic drug:  Insulin NPH Isophane & Regular Imaging:  US VENOUS DOPPLER LEG LEFT - DIAG IMG ([id number]=[id number])    Instructions:   To schedule a test at any [facility], call Central Scheduling at [phone], Monday through Friday between 7:30am to 6pm and on Saturday between Eat plenty of protein, keep the fat content low Sugars:  sodas and sports drinks, candies and desserts   Eat plenty of low-fat dairy products High fat meats and dairy   Choose whole grain products Foods high in sodium   Water is best for hydration Fast leeanna

## (undated) NOTE — IP AVS SNAPSHOT
Patient Demographics     Address  [address] [place] [address] Phone  [phone] [facility]) *Preferred*  [phone] (Work)      Emergency Contact(s)     Name Relation Home Work Mobile    Gaby Bernard Friend [address] [facility] [phone] morphINE sulfate (PF) 2 MG/ML injection 1 mg 09/03/19 1857 Given      [id number] morphINE sulfate (PF) 2 MG/ML injection 1 mg 09/04/19 0032 Given      [id number] morphINE sulfate (PF) 2 MG/ML injection 1 mg 09/04/19 0655 Given              Recent Diagnosis Date   • Arrhythmia     AICD/Pacer   • Atherosclerosis of coronary artery 2010   • Back problem    • Basal cell carcinoma, forehead 2012    excision   • Cataract 2004    OU   • CKD (chronic kidney disease) stage 3, GFR 30-59 ml/min ([address]     dial • COLONOSCOPY  04/03/2018    polypectomy   • COLONOSCOPY N/A 4/4/2018    Performed by Wei Brock MD at [address] [facility] ENDOSCOPY   • ESOPHAGOGASTRODUODENOSCOPY (EGD) N/A 4/4/2018    Performed by Wei Brock MD at [address] [place] [address] Lancet Device Does not apply Misc Use as directed 3 times a day       Review of Systems:   Could not obtain. Physical Exam:   Vital Signs:  Blood pressure (!) 87/12, pulse 83, temperature 98.2 °F (36.8 °C), temperature source Axillary, resp.  rate 20, Sp B12 471 02/08/2019       Recent Labs   Lab 09/02/19  0420 09/03/19  0536   RBC 4.46 4.60   HGB 14.4 14.4   HCT 44.3 45.1   MCV 99.3 98.0   MCH 32.3 31.3   MCHC 32.5 31.9   RDW 17.6* 17.6*   NEPRELIM 8.58* 7.83*   WBC 10.9 9.9   PLT 95.0* 120.0*   NEUT 71 Date of Discharge: No discharge date for patient encounter. Hospital Discharge Diagnoses: failure to thrive[SH.1]    Lace+ Score: 63[SH.2]  59-90 High Risk  29-58 Medium Risk  0-28   Low Risk.     TCM Follow-Up Recommendation:  LACE < 29: Low Risk of marcell !! - Potential duplicate medications found. Please discuss with provider.               Discharge Diet: NA    Discharge Activity: As tolerated       Discharge Medications      CONTINUE taking these medications      Instructions Prescription details   Insuli catheter is seen in the superior vena cava. A defibrillator is noted in place. CONCLUSION:  1. Cardiomegaly. 2. Atherosclerosis. 3. Hyperinflation. 4. Scarring/atelectasis. 5. Demineralization. 6. Scoliosis. 7. Osteoarthritis.  8. Catheter in superi

## (undated) NOTE — LETTER
11/6/2017          To Whom It May Concern:    Blair Ro is currently under my medical care and may not return to work at this time. Please excuse Leann for the next few days. She may return to work on 11/14/2017.   Activity is restricted as follows:

## (undated) NOTE — LETTER
5/2/2018          To Whom It May Concern:    Kevan Boothe is currently under my medical care and may return to work at this time. She may return to work on 5/7/2018 as tolerated. Please allow MsTaylor  Donna Skaggs to work as many hours as tolerated and allow her

## (undated) NOTE — LETTER
[address] [place] [address]  Authorization for Invasive Procedures  1.  I hereby authorize Dr. Mayte Trery , my physician and whomever may be designated as the doctor's assistant, to perform the following operation and/or procedure: performed for the purposes of advancing medicine, science, and/or education, provided my identity is not revealed. If the procedure has been videotaped, the physician/surgeon will obtain the original videotape.  The hospital will not be responsible for stor My signature below affirms that prior to the time of the procedure, I have explained to the patient and/or her legal representative, the risks and benefits involved in the proposed treatment and any reasonable alternative to the proposed treatment.  I have

## (undated) NOTE — MR AVS SNAPSHOT
[address]  1200 [facility]  [phone]               Thank you for choosing us for your health care visit with MIRI Marion, AN-C.   We are glad to serve you and happy to provide you with [facility] Health/Hasmukh Calabrese Building  Diagnostics Main [facility] Parking) (Yellow Parking)  [address]   [address] [facility],  Trudy Matthews, [facility]  [address]  Main PODIATRY - INTERNAL [[id number] CUSTOM]  Order #:  [id number]                  **REFERRAL REQUEST**    Your physician has referred you to a specialist.  Your physician or the clinic staff will provide you with the phone number you should call to schedule you VASCULAR SURGERY - INTERNAL [[id number] CUSTOM]  Order #:  [id number]                  **REFERRAL REQUEST**    Your physician has referred you to a specialist.  Your physician or the clinic staff will provide you with the phone number you should call to sche Take 1 tablet (2 mg total) by mouth daily.    Commonly known as:  BUMEX           carvedilol 12.5 MG Tabs   Take 2 tablets in am and 2 tablets in pm   Commonly known as:  COREG           DIGOX 0.125 MG Tabs   Generic drug:  digoxin   TAKE 1 TABLET BY MOUTH Take 20 mg by mouth daily with food. * Notice: This list has 4 medication(s) that are the same as other medications prescribed for you. Read the directions carefully, and ask your doctor or other care provider to review them with you.          Wh

## (undated) NOTE — LETTER
[facility] [address], [address]  Consent for Procedure/Sedation    Date:        Time:       1. I authorize the performance upon Blair Ro the following:  Transcatheter Left Atrial Appendage Closure Device Implant     2.  I au ________________________________    ___________________    Witness: _________________________      Date: ___________________    Printed: 2018   3:34 PM  Patient Name: Leno Li        : 1945       Medical Record #: [id number]

## (undated) NOTE — IP AVS SNAPSHOT
Patient Demographics     Address  [place] [address] Phone  [phone] [facility]) *Preferred*  [phone] (Work)      Emergency Contact(s)     Name Relation Home Work Mobile    Gaby Bernard Friend Marcell Pozo isosorbide dinitrate 5 MG Tabs  Commonly known as:  ISORDIL TITRADOSE  Next dose due: Tonight, 2/20      Take 1 tablet (5 mg total) by mouth TID (Nitrates).    Hiral Snow MD         Lancet Device Misc      Use as directed 3 times a day   Cyndi Mcpherson Please  your prescriptions at the location directed by your doctor or nurse    Bring a paper prescription for each of these medications  hydrALAzine HCl 10 MG Tabs  isosorbide dinitrate 5 MG Tabs  Metoprolol Succinate ER 50 MG Tb24           325-731 CPAP Settings (Inpatient)       Most Recent Value   Mode  Spontaneous/Timed   Interface  Full face mask   Mask size  Medium   Set rate  12 breaths/min   Set IPAP  12   Set EPAP  5   O2%  40 %   I time  1         Lab Results Last 24 Hours      MAGNESIUM Procedure                               Abnormality         Status                     ---------                               -----------         ------                     CBC W/ DIFFERENTIAL[[id number]]          Abnormal            Final result Blood Culture Result No Growth 5 Days    Blood Culture FREQ X 2 [[id number]] Collected:  01/30/19 1637    Order Status:  Completed Lab Status:  Final result Updated:  02/04/19 1800    Specimen:  Blood,peripheral      Blood Culture Result No Growth 5 Days atrial fibrillation, gastrointestinal bleed secondary to peptic ulcer disease, has been taken off blood thinners and had a Watchman device placed in May 2018. Recent HORACIO showed small leakage from the Watchman device around 3 mm in diameter.   The patient a NECK:  Trachea midline. Full range of motion. Supple. No thyromegaly or lymphadenopathy. Positive jugular venous distention. LUNGS:  Clear to auscultation bilaterally. Normal respiratory effort. No intercostal retractions.    HEART:  Regular rate a ATTENDING PHYSICIAN: Lisa Kirk DO   CONSULTING PHYSICIAN: Kika Sullivan MD   PATIENT ACCOUNT#:   [de-identified]    LOCATION:  [address] [place] [address]. RECORD #:   [id number]       YOB: 1945  ADMISSION DATE:       01/29/2019      CON moderate mitral regurgitation was noted. Severe tricuspid regurgitation was noted. No pericardial effusion was noted.      MEDICATIONS:  Allopurinol 100 mg daily, one aspirin 81 mg daily, Lipitor, Diuril 500 mg IV push, NovoLog, Levemir, singular, Protoni IMPRESSION:  The patient is a 79-year-old female now with:    1. Acute on chronic renal insufficiency. I suspect the latter is secondary to decreased renal perfusion. The patient has had occasional episodes of hypotension.   She is at risk for cardior Imaging Results (HF patients)    Chest X-Ray Results (HF patients only)    No exam resulted this encounter.       2D Echocardiogram Results (HF patients only)    Card Echo 2d Doppler (cpt=93306)    Result Date: 1/31/2019                    [facility] performed from the parasternal, apical, and subcostal acoustic windows. Study completion:  The patient tolerated the procedure well. There were no complications. Transthoracic echocardiography.   M-mode, complete 2D, complete spectral Doppler, and color Do right atrium. No abnormal features noted. Inferior vena cava: The vessel was dilated.  The respirophasic diameter changes were blunted (< 50%), consistent with elevated central venous pressure. --------------------------------------------------------------- atrium                                 Value        Reference  LA ID, A-P, ES                      (H)     5.3   cm     2.7 - 3.8  LA volume, ES, 2-p                          109   ml     ---------  LA volume/bsa, ES, 2-p              (H)     50    ml/m^2 chest pain and just got back to bed, then patient going to HD. Will attempt to see patient tomorrow for physical therapy session. RN aware and agreeable. [JG.1]      Attribution Myers    JG. 1 - Gilda Almanza, PT on 2/20/2019  1:38 PM               Physic Mobility Short Form. Research supports that patients with this level of impairment may benefit from rehab facility. DISCHARGE RECOMMENDATIONS  PT Discharge Recommendations: Sub-acute rehabilitation     PLAN  PT Treatment Plan: Bed mobility; Body mechani aware of session/findings; All patient questions and concerns addressed    CURRENT GOALS   Goals to be met by: 2/20/19  Patient Goal Patient's self-stated goal is: to go home   Goal #1 Patient is able to demonstrate supine - sit EOB @ level: minimum assista Attribution Myers    AA. 1 - Saurabh Ryan OT on 2/20/2019 11:38 AM               Occupational Therapy Note signed by Nithin Sotelo OT at 2/18/2019  1:49 PM  Version 1 of 1    Author:  Nithin Sotelo OT Service:  Rehab Author Type:  Occupational The training;Energy conservation/work simplification techniques[SS. 2]    SUBJECTIVE  \"I might be going to rehab tomorrow. \"    OBJECTIVE[SS.1]  Precautions: Cardiac[SS.2]    WEIGHT BEARING RESTRICTION[SS.1]  Weight Bearing Restriction: None[SS.2] Patient End of Session: Up in chair;Needs met;Call light within reach;RN aware of session/findings; All patient questions and concerns addressed[SS.2]    OT Goals:        Patient will complete functional transfer with mod a   Comment: CGA for sit <> stand f

## (undated) NOTE — LETTER
2/1/2019    [address] [place] [id number]            Dear Jeff Sr,      Our records indicate that you are due for an appointment for a Colonoscopy in April 2019, or shortly there after, with Michael Erickson MD.    [facility]

## (undated) NOTE — IP AVS SNAPSHOT
Patient Demographics     Address  [place] [address] Phone  [phone] [facility]) *Preferred*  [phone] (Work)      Emergency Contact(s)     Name Relation Home Work Mobile    Gaby Bernard Friend Marcell Pozo Next dose due: Tonight 3/22      Take 2 capsules (600 mg total) by mouth 3 (three) times daily for 10 days. Stop taking on:  4/1/2019   Zain Solorzano MD         digoxin 0.125 MG Tabs  Commonly known as:  LANOXIN  Start taking on:  3/23/2019  Next do Next dose due: Tomorrow 3/23      Apply 1 Application topically daily. Mayank Santos PA-C         Montelukast Sodium 10 MG Tabs  Commonly known as:  SINGULAIR  Next dose due:   Tonight 3/22      TAKE 1 TABLET(10 MG) BY MOUTH EVERY DAY   Davis Pal, [id number] Midodrine HCl (PROAMATINE) tab 5 mg 03/22/19 0622 Given      [id number] Midodrine HCl (PROAMATINE) tab 5 mg 03/22/19 1235 Given      [id number] Montelukast Sodium (SINGULAIR) tab 10 mg 03/21/19 2057 Given      [id number] Pantoprazole Sodium (PROTON Pulse  80 Filed at 03/22/2019 1111   Resp  18 Filed at 03/22/2019 1111   Temp  97.5 °F (36.4 °C) Filed at 03/22/2019 0819   SpO2  96 % Filed at 03/22/2019 1234      Patient's Most Recent Weight       Most Recent Value   Patient Weight  98.4 kg (217 lb) The following orders were created for panel order CBC WITH DIFFERENTIAL WITH PLATELET.   Procedure                               Abnormality         Status                     ---------                               -----------         ------ started on dialysis. Digoxin was discontinued. Patient is not on anticoagulation. She had history of Watchman device placement, sent to a nursing home for further rehab.   Developed cellulitis recently and started on Fortaz and vancomycin IV, developed a SOCIAL HISTORY:  Denies tobacco, alcohol, or drug use. Currently resides in a nursing facility, marginally able to participate in physical therapy. Requires some assistance in her basic activities of daily living.     REVIEW OF SYSTEMS:  She denied any si 1.   Atrial fibrillation with rapid ventricular response, possible component of intravascular hypovolemia versus decreased cardiac output secondary to fast rapid atrial fibrillation.   2.   Bilateral lower extremity cellulitis with reaction possible to vanc and initiated chronic hemodialysis. Continues to have significant lower extremity edema and last week she started to note cellulitic changes involving her lower extremities. Started on vancomycin, developed severe pruritus.   Upon transfer noted blood pre Smoking status: Former Smoker        Quit date: 2003        Years since quittin.2      Smokeless tobacco: Never Used    Substance and Sexual Activity      Alcohol use: No        Alcohol/week: 0.0 oz      Drug use: No      Sexual activity: No GI: No nausea, vomiting or diarrhea. No blood in stools. Neurologic: No seizures, tremors, weakness or numbness. PE:   Blood pressure (!) 84/69, pulse (!) 129, temperature 98.9 °F (37.2 °C), temperature source Temporal, resp.  rate 22, height 157.5 cm CL  95*  98   CO2  24.0  20.0*     Recent Labs   Lab  03/18/19   1121  03/19/19   0427   RBC  3.75*  3.52*   HGB  11.1*  10.5*   HCT  37.5  34.5*   MCV  100.0  98.0   MCH  29.6  29.8   MCHC  29.6*  30.4*   RDW  20.4*  20.4*   NEPRELIM  8.05*  8.20*   WBC -[AV.1] Will start midodrine for improved decongestion strategies as CVP remains high  - Will eventuallyrwesume low dose toprol XL for arryhthmia management[AV.2]    [AV.1]  AF  - rates suboptimal   - start digoxin after HD sessions t/t/sat   - Will need A Physician Order: PT Eval and Treat    Presenting Problem: allergic reaction  Reason for Therapy: Mobility Dysfunction and Discharge Planning    PHYSICAL THERAPY ASSESSMENT   Patient is a 68year old female admitted 3/18/2019 for allergic reaction.   Patient PHYSICAL THERAPY MEDICAL/SOCIAL HISTORY   Problem List  Principal Problem:     Allergic reaction, initial encounter  Active Problems:    Allergic reaction      Past Medical History  Past Medical History:   Diagnosis Date   • Arrhythmia     AICD/Pacer   • At Type of Home: [address] [place] [address] Street: One level  Stairs to Enter : 0  Railing: No  Stairs to Bedroom: 0  Railing: No    Lives With: Staff 24 hours  Drives: No  Patient Owned Equipment: Rolling walker  Patient Regularly Uses: None    Prior L Pattern: Within Functional Limits  Stoop/Curb Assistance: Not tested       Bed Mobility: Not tested     Transfers: mod A x 1 sit to stand and min A x 1 stand to sit     Exercise/Education Provided:   Body mechanics  Gait training  Transfer training    Brenda Author:  Spike Killian OT Service:  Rehab Author Type:  Occupational Therapist    Filed:  3/20/2019 12:59 PM Date of Service:  3/20/2019 12:36 PM Status:  Signed    :  Spike Killian OT (Occupational Therapist)       Deepak PLAN[AO.1]  OT Treatment Plan: Balance activities; Energy conservation/work simplification techniques;ADL training;Functional transfer training; Endurance training;Patient/Family education;Patient/Family training;Equipment eval/education[AO.2]       OCCUPATI • ESOPHAGOGASTRODUODENOSCOPY (EGD) N/A 4/3/2018    Performed by Batool Polanco MD at [address] [place] [address] ENDOSCOPY   • OTHER SURGICAL HISTORY      watchmand device 2018   • REPAIR RETINAL BREAKS, CRYOTHERAPY - OD - RIGHT EYE     • WATCHMAN N/A 5/25/2018    Pe CMS Modifier (G-Code): CK[AO.2]    FUNCTIONAL TRANSFER ASSESSMENT[AO.1]  Supine to Sit : Not tested  Sit to Stand: Minimum assistance[AO.2]  Toilet Transfer: min a   Shower Transfer: NT  Chair Transfer: min a     Bedroom Mobility: min a with use of RW    B

## (undated) NOTE — LETTER
[facility], [place]  Authorization for Invasive Procedures  1.  I hereby authorize Dr. Camilo Nash, my physician and whomever may be designated as the doctor's assistant, to perform the following operation and/or procedure:  Yelena Montes performed for the purposes of advancing medicine, science, and/or education, provided my identity is not revealed. If the procedure has been videotaped, the physician/surgeon will obtain the original videotape.  The hospital will not be responsible for stor My signature below affirms that prior to the time of the procedure, I have explained to the patient and/or her legal representative, the risks and benefits involved in the proposed treatment and any reasonable alternative to the proposed treatment.  I have

## (undated) NOTE — LETTER
[address], [place]  Authorization for Invasive Procedures  1.  I hereby authorize Dr. Glo Castro , my physician and whomever may be designated as the doctor's assistant, to perform the following operation and/or procedure:  Elec performed for the purposes of advancing medicine, science, and/or education, provided my identity is not revealed. If the procedure has been videotaped, the physician/surgeon will obtain the original videotape.  The hospital will not be responsible for stor My signature below affirms that prior to the time of the procedure, I have explained to the patient and/or her legal representative, the risks and benefits involved in the proposed treatment and any reasonable alternative to the proposed treatment.  I have

## (undated) NOTE — LETTER
Date: 6/27/2018    Patient Name: Brandee Pugh          To Whom it may concern: This letter has been written at the patient's request. The above patient was seen at the [facility] for treatment of a medical condition.     Hebert Ford

## (undated) NOTE — LETTER
7/16/2018          To Whom It May Concern:    Royer Atkins is currently under my medical care and was evaluated in the office today. She may return to work on 7/18/18. Activity is restricted as follows: none.     If you require additional information ple

## (undated) NOTE — MR AVS SNAPSHOT
[address]  [phone]               Thank you for choosing us for your health care visit with Jay Negro MD.  We are glad to serve you and happy to provide you with this summary of your visit. cephALEXin 500 MG Caps   Take 1 capsule (500 mg total) by mouth 3 (three) times daily. Commonly known as:  KEFLEX           DIGOX 0.125 MG Tabs   Generic drug:  digoxin   TAKE 1 TABLET BY MOUTH DAILY.            * Glucose Blood Strp   TEST 2 TO 3 Call the unta[employer]k for assistance with your inactive [employer] account    If you have questions, you can call [phone] to talk to our [facility] Staff. Remember, [employer] is NOT to be used for urgent needs. For medical emergencies, dial 911.     Vi

## (undated) NOTE — LETTER
4/17/2018          To Whom It May Concern:    Edenilson Downs is currently under my medical care and may not return to work at this time. Please excuse Loco King for days missed started 4/3/18. We will follow up in 2 weeks to reassess needs.     If you requir

## (undated) NOTE — LETTER
[address], [place]  Authorization for Invasive Procedures  1. I hereby authorize Dr Frank Mendez , my physician and whomever may be designated as the doctor's assistant, to perform the following operation and/or procedure:   Tamara Melissa performed for the purposes of advancing medicine, science, and/or education, provided my identity is not revealed. If the procedure has been videotaped, the physician/surgeon will obtain the original videotape.  The hospital will not be responsible for stor My signature below affirms that prior to the time of the procedure, I have explained to the patient and/or her legal representative, the risks and benefits involved in the proposed treatment and any reasonable alternative to the proposed treatment.  I have

## (undated) NOTE — Clinical Note
LD, pt eligible for tcm until 4/27. She declined appt at this time. She states she will call back to schedule when she recovers some more.

## (undated) NOTE — LETTER
[facility] [address], [address] [facility]    Consent for Anesthesia   1.    Leora Cami agree to be cared for by an anesthesiologist, who is specially trained to monitor me and give me medicine to put me to sleep or keep me comfortabl vision, nerves, or muscles and in extremely rare instances death. 5. My doctor has explained to me other choices available to me for my care (alternatives).   6. Pregnant Patients (“epidural”):  I understand that the risks of having an epidural (medicine g

## (undated) NOTE — IP AVS SNAPSHOT
[facility]            (For Outpatient Use Only) Initial Admit Date: 9/3/2019   Inpt/Obs Admit Date: Inpt: 9/3/19 / Obs: N/A   Discharge Date:    Chidi Martinze:  [de-identified]   MRN: [de-identified]   CSN: [id number]   [facility] Financial Class:  Other    September 4, 2019

## (undated) NOTE — LETTER
1/4/2019          To Whom It May Concern:    Kellee Carranza is currently under my medical care and was evaluated in the office on 1/3/19. Due to multiple medical comorbidities, she is unable to return to work at this time. Please Veronica Espitia from work.  She will

## (undated) NOTE — LETTER
November 6, 2017     Seymour Fly  [address] [phone]      Dear Zi Pretty:    Below are the results from your recent visit:    Resulted Orders   URIC ACID, SERUM   Result Value Ref Range    Uric Acid 13.2 (H) 2.1 - 7.4 mg/dL   CBC W/ DIFFERE

## (undated) NOTE — LETTER
[facility], [place]  Authorization for Invasive Procedures  1.  I hereby authorize  Dr. Yuval Bear , my physician and whomever may be designated as the doctor's assistant, to perform the following operation and/or procedure: Implantabl performed for the purposes of advancing medicine, science, and/or education, provided my identity is not revealed. If the procedure has been videotaped, the physician/surgeon will obtain the original videotape.  The hospital will not be responsible for stor My signature below affirms that prior to the time of the procedure, I have explained to the patient and/or her legal representative, the risks and benefits involved in the proposed treatment and any reasonable alternative to the proposed treatment.  I have

## (undated) NOTE — LETTER
[address], [place]  Authorization for Invasive Procedures  1.  I hereby authorize Dr. Colby Emanuel , my physician and whomever may be designated as the doctor's assistant, to perform the following operation and/or procedure:  Card the event that I wish to have autologous transfusions of my own blood, or a directed donor transfusion, I will discuss this with my physician.      5. I consent to the photographing of the operations or procedures to be performed for the purposes of advanci Responsible person in case of minor or unconscious: _____________________________Relationship: ____________     Witness Signature: ____________________________________________ Date: __________ Time: ___________    Statement of Physician  My signature below

## (undated) NOTE — IP AVS SNAPSHOT
[facility]            (For Outpatient Use Only) Initial Admit Date: 3/18/2019   Inpt/Obs Admit Date: Inpt: 3/18/19 / Obs: N/A   Discharge Date:    Jennifer Mazariegos:  [de-identified]   MRN: [de-identified]   CSN: [id number]        ENCOUNTER  Patient Class Subscriber Name:  Tanasuraj Plazareji :    Subscriber ID:  Pt Rel to Subscriber:    Hospital Account Financial Class: Medicare    2019

## (undated) NOTE — LETTER
[address], [place]  Authorization for Surgical Operation or Procedure    1.  I hereby authorize MIRI Larry, my physician and the assistant, to perform the following operation and/or procedure:  Ultrasound-Assisted Para 5. I consent to the photographing of the operations or procedures to be performed for the purposes of advancing medicine, science, and/or education, provided my identity is not revealed.  If the procedure has been videotaped, the physician/surgeon will obta risks and benefits involved in the proposed treatment and any reasonable alternative to the proposed treatment. I have also explained the risks and benefits involved in the refusal of the proposed treatment and have answered the patient's questions.  If I h

## (undated) NOTE — LETTER
[address] [place] [address]  Authorization for Invasive Procedures  1.  I hereby authorize Dr. Argelia Castro , my physician and whomever may be designated as the doctor's assistant, to perform the following operation and/or procedure: performed for the purposes of advancing medicine, science, and/or education, provided my identity is not revealed. If the procedure has been videotaped, the physician/surgeon will obtain the original videotape.  The hospital will not be responsible for stor My signature below affirms that prior to the time of the procedure, I have explained to the patient and/or her legal representative, the risks and benefits involved in the proposed treatment and any reasonable alternative to the proposed treatment.  I have

## (undated) NOTE — IP AVS SNAPSHOT
[facility]            (For Outpatient Use Only) Initial Admit Date: 1/29/2019   Inpt/Obs Admit Date: Inpt: 1/29/19 / Obs: N/A   Discharge Date:    Fanny Wright:  [de-identified]   MRN: [de-identified]   CSN: [id number]        ENCOUNTER  Patient Class Subscriber Name:  Jl Maciel :    Subscriber ID:  Pt Rel to Subscriber:    Hospital Account Financial Class: Medicare    2019

## (undated) NOTE — LETTER
[facility], [place]  Authorization for Invasive Procedures  1.  I hereby authorize  Dr. Albin Garcia  my physician and whomever may be designated as the doctor's assistant, to perform the following operation and/or procedure Lef performed for the purposes of advancing medicine, science, and/or education, provided my identity is not revealed. If the procedure has been videotaped, the physician/surgeon will obtain the original videotape.  The hospital will not be responsible for stor My signature below affirms that prior to the time of the procedure, I have explained to the patient and/or her legal representative, the risks and benefits involved in the proposed treatment and any reasonable alternative to the proposed treatment.  I have

## (undated) NOTE — LETTER
[address], [place]  Authorization for Invasive Procedures  1.  I hereby authorize Fozia Barajas, my physician and whomever may be designated as the doctor's assistant, to perform the following operation and/or procedure:  para performed for the purposes of advancing medicine, science, and/or education, provided my identity is not revealed. If the procedure has been videotaped, the physician/surgeon will obtain the original videotape.  The hospital will not be responsible for stor My signature below affirms that prior to the time of the procedure, I have explained to the patient and/or her legal representative, the risks and benefits involved in the proposed treatment and any reasonable alternative to the proposed treatment.  I have